# Patient Record
Sex: MALE | Race: BLACK OR AFRICAN AMERICAN | Employment: UNEMPLOYED | URBAN - METROPOLITAN AREA
[De-identification: names, ages, dates, MRNs, and addresses within clinical notes are randomized per-mention and may not be internally consistent; named-entity substitution may affect disease eponyms.]

---

## 2019-05-05 ENCOUNTER — APPOINTMENT (OUTPATIENT)
Dept: CT IMAGING | Age: 64
End: 2019-05-05
Payer: MEDICARE

## 2019-05-05 ENCOUNTER — HOSPITAL ENCOUNTER (EMERGENCY)
Age: 64
Discharge: HOME | End: 2019-05-05
Attending: EMERGENCY MEDICINE
Payer: MEDICARE

## 2019-05-05 VITALS
HEART RATE: 82 BPM | HEIGHT: 73 IN | SYSTOLIC BLOOD PRESSURE: 128 MMHG | DIASTOLIC BLOOD PRESSURE: 97 MMHG | OXYGEN SATURATION: 97 % | RESPIRATION RATE: 20 BRPM | TEMPERATURE: 97.5 F | WEIGHT: 180 LBS | BODY MASS INDEX: 23.86 KG/M2

## 2019-05-05 DIAGNOSIS — F10.920 ACUTE ALCOHOLIC INTOXICATION WITHOUT COMPLICATION (HCC): ICD-10-CM

## 2019-05-05 DIAGNOSIS — S09.90XA CLOSED HEAD INJURY, INITIAL ENCOUNTER: Primary | ICD-10-CM

## 2019-05-05 LAB
ABO/RH: NORMAL
ABSOLUTE EOS #: 0.1 K/UL (ref 0–0.4)
ABSOLUTE IMMATURE GRANULOCYTE: ABNORMAL K/UL (ref 0–0.3)
ABSOLUTE LYMPH #: 2.3 K/UL (ref 1–4.8)
ABSOLUTE MONO #: 0.5 K/UL (ref 0.1–1.3)
ALBUMIN SERPL-MCNC: 3.8 G/DL (ref 3.5–5.2)
ALBUMIN/GLOBULIN RATIO: ABNORMAL (ref 1–2.5)
ALP BLD-CCNC: 110 U/L (ref 40–129)
ALT SERPL-CCNC: 102 U/L (ref 5–41)
ANION GAP SERPL CALCULATED.3IONS-SCNC: 13 MMOL/L (ref 9–17)
ANTIBODY SCREEN: NEGATIVE
ARM BAND NUMBER: NORMAL
AST SERPL-CCNC: 127 U/L
BASOPHILS # BLD: 1 % (ref 0–2)
BASOPHILS ABSOLUTE: 0 K/UL (ref 0–0.2)
BILIRUB SERPL-MCNC: 0.68 MG/DL (ref 0.3–1.2)
BUN BLDV-MCNC: 8 MG/DL (ref 8–23)
BUN/CREAT BLD: ABNORMAL (ref 9–20)
CALCIUM SERPL-MCNC: 8.3 MG/DL (ref 8.6–10.4)
CHLORIDE BLD-SCNC: 105 MMOL/L (ref 98–107)
CO2: 27 MMOL/L (ref 20–31)
CREAT SERPL-MCNC: 0.77 MG/DL (ref 0.7–1.2)
DIFFERENTIAL TYPE: ABNORMAL
EOSINOPHILS RELATIVE PERCENT: 2 % (ref 0–4)
ETHANOL PERCENT: 0.28 %
ETHANOL: 281 MG/DL
EXPIRATION DATE: NORMAL
GFR AFRICAN AMERICAN: >60 ML/MIN
GFR NON-AFRICAN AMERICAN: >60 ML/MIN
GFR SERPL CREATININE-BSD FRML MDRD: ABNORMAL ML/MIN/{1.73_M2}
GFR SERPL CREATININE-BSD FRML MDRD: ABNORMAL ML/MIN/{1.73_M2}
GLUCOSE BLD-MCNC: 109 MG/DL (ref 70–99)
HCT VFR BLD CALC: 43.7 % (ref 41–53)
HEMOGLOBIN: 14.9 G/DL (ref 13.5–17.5)
IMMATURE GRANULOCYTES: ABNORMAL %
INR BLD: 1.1
LYMPHOCYTES # BLD: 45 % (ref 24–44)
MCH RBC QN AUTO: 32.6 PG (ref 26–34)
MCHC RBC AUTO-ENTMCNC: 34 G/DL (ref 31–37)
MCV RBC AUTO: 95.8 FL (ref 80–100)
MONOCYTES # BLD: 9 % (ref 1–7)
NRBC AUTOMATED: ABNORMAL PER 100 WBC
PARTIAL THROMBOPLASTIN TIME: 36 SEC (ref 24–36)
PDW BLD-RTO: 14.4 % (ref 11.5–14.9)
PLATELET # BLD: 155 K/UL (ref 150–450)
PLATELET ESTIMATE: ABNORMAL
PMV BLD AUTO: 7.9 FL (ref 6–12)
POTASSIUM SERPL-SCNC: 3.7 MMOL/L (ref 3.7–5.3)
PROTHROMBIN TIME: 14.6 SEC (ref 11.8–14.6)
RBC # BLD: 4.57 M/UL (ref 4.5–5.9)
RBC # BLD: ABNORMAL 10*6/UL
SEG NEUTROPHILS: 43 % (ref 36–66)
SEGMENTED NEUTROPHILS ABSOLUTE COUNT: 2.3 K/UL (ref 1.3–9.1)
SODIUM BLD-SCNC: 145 MMOL/L (ref 135–144)
TOTAL PROTEIN: 7.2 G/DL (ref 6.4–8.3)
WBC # BLD: 5.3 K/UL (ref 3.5–11)
WBC # BLD: ABNORMAL 10*3/UL

## 2019-05-05 PROCEDURE — 6370000000 HC RX 637 (ALT 250 FOR IP): Performed by: EMERGENCY MEDICINE

## 2019-05-05 PROCEDURE — 86901 BLOOD TYPING SEROLOGIC RH(D): CPT

## 2019-05-05 PROCEDURE — 71250 CT THORAX DX C-: CPT

## 2019-05-05 PROCEDURE — 85730 THROMBOPLASTIN TIME PARTIAL: CPT

## 2019-05-05 PROCEDURE — 36415 COLL VENOUS BLD VENIPUNCTURE: CPT

## 2019-05-05 PROCEDURE — 85025 COMPLETE CBC W/AUTO DIFF WBC: CPT

## 2019-05-05 PROCEDURE — 70486 CT MAXILLOFACIAL W/O DYE: CPT

## 2019-05-05 PROCEDURE — 70450 CT HEAD/BRAIN W/O DYE: CPT

## 2019-05-05 PROCEDURE — 99284 EMERGENCY DEPT VISIT MOD MDM: CPT

## 2019-05-05 PROCEDURE — 86900 BLOOD TYPING SEROLOGIC ABO: CPT

## 2019-05-05 PROCEDURE — 86850 RBC ANTIBODY SCREEN: CPT

## 2019-05-05 PROCEDURE — G0480 DRUG TEST DEF 1-7 CLASSES: HCPCS

## 2019-05-05 PROCEDURE — 72125 CT NECK SPINE W/O DYE: CPT

## 2019-05-05 PROCEDURE — 80053 COMPREHEN METABOLIC PANEL: CPT

## 2019-05-05 PROCEDURE — 85610 PROTHROMBIN TIME: CPT

## 2019-05-05 RX ORDER — ACETAMINOPHEN 500 MG
1000 TABLET ORAL ONCE
Status: COMPLETED | OUTPATIENT
Start: 2019-05-05 | End: 2019-05-05

## 2019-05-05 RX ORDER — LORAZEPAM 1 MG/1
1 TABLET ORAL ONCE
Status: COMPLETED | OUTPATIENT
Start: 2019-05-05 | End: 2019-05-05

## 2019-05-05 RX ADMIN — ACETAMINOPHEN 1000 MG: 500 TABLET, FILM COATED ORAL at 06:20

## 2019-05-05 RX ADMIN — LORAZEPAM 1 MG: 1 TABLET ORAL at 06:20

## 2019-05-05 ASSESSMENT — PAIN SCALES - GENERAL: PAINLEVEL_OUTOF10: 8

## 2019-05-05 ASSESSMENT — PAIN DESCRIPTION - ORIENTATION: ORIENTATION: LEFT

## 2019-05-05 ASSESSMENT — PAIN DESCRIPTION - FREQUENCY: FREQUENCY: CONTINUOUS

## 2019-05-05 ASSESSMENT — PAIN DESCRIPTION - PAIN TYPE: TYPE: ACUTE PAIN

## 2019-05-05 ASSESSMENT — PAIN DESCRIPTION - LOCATION: LOCATION: ARM

## 2019-05-05 NOTE — ED NOTES
Pt arrives to ED c/o fall, incontinence and left sided weakness. Pt states he was at a bar last night when he fell off of a stool. Pt states he is unsure if he hit his head or not. Pt states his friends later took him to his nieces house where he fell asleep until he woke up and urinated on himself. Pt states he then noticed that his left arm felt tingly and numb. Pt states he cant move his left arm or leg. Pt states that while he was at the bar he drank 5 shots of gin, patron, and budweiser. Pt states he drinks daily and finds no problem with it. When this RN asked if he drinks upon waking up in the morning pt stated \"hell yeah I do and I smoke marijuana. \" Pt states he used to be an IV drug user but no longer uses. Upon arrival this RN witnessed pt rolling over from stretcher to bed and moving himself up. Pt attached to cardiac monitor and continuous pulse oximetry. Pt is A&Ox4, eupneic, PWD. GCS=15. Call light in reach.          Meaghan Melchor RN  05/05/19 9082

## 2019-05-05 NOTE — ED NOTES
Report given to Shiela Hallman RN from ED. Report method in person   The following was reviewed with receiving RN:   Current vital signs:  BP (!) 123/90   Pulse 74   Temp 97.5 °F (36.4 °C) (Oral)   Resp 16   Ht 6' 1\" (1.854 m)   Wt 180 lb (81.6 kg)   SpO2 97%   BMI 23.75 kg/m²                MEWS Score: 1     Any medication or safety alerts were reviewed. Any pending diagnostics and notifications were also reviewed, as well as any safety concerns or issues, abnormal labs, abnormal imaging, and abnormal assessment findings. Questions were answered.             Elvira Morales RN  05/05/19 0055

## 2019-05-05 NOTE — ED NOTES
Bed: 08  Expected date:   Expected time:   Means of arrival:   Comments:     Tamia Gallego RN  05/05/19 6336

## 2019-05-05 NOTE — ED NOTES
C= \"Have you ever felt that you should Cut down on your drinking? \"  No  A= \"Have people Annoyed you by criticizing your drinking? \"  No  G= \"Have you ever felt bad or Guilty about your drinking? \"  No  E= \"Have you ever had a drink as an Eye-opener first thing in the morning to steady your nerves or to help a hangover? \"  Yes      Deferred []      Reason for deferring: N/A    *If yes to two or more: probable alcohol abuse. Marialuisa Rubio RN  05/05/19 0955

## 2019-05-05 NOTE — ED PROVIDER NOTES
eMERGENCY dEPARTMENT eNCOUnter    Pt Name: Justina Ceron  MRN: [id number]  [place] 1955  Date of evaluation: 5/5/19  CHIEF COMPLAINT       Chief Complaint   Patient presents with    Fall    Alcohol Intoxication     HISTORY OF PRESENT ILLNESS   HPI  HISTORY OF PRESENT ILLNESS:  Past medical history of alcohol abuse presents for chief complaint of diffuse left-sided pain as well as headache after a fall from the ball stool last evening. Patient was able to make it home. Patient states he is having a hard time moving because of his all over pain. However was able to roll over the copy. Patient denies any chest pain shortness breath. No lightheadedness or dizziness. No blurry vision. No nausea or vomiting. Severity is moderate. No aggravating or relieving factors. Timing is one day. Course is constant.   Context is trauma  -----------------------  -----------------------  REVIEW OF SYSTEMS  ED Caveat: [none]  Gen:  No fever, no chills  CV: No CP, no palpitations  Resp: No SOB, no respiratory distress  GI: No V/D, no abd pain  : No dysuria, no increased frequency  Skin: No rash, no purulent lesions  Eyes: No blurry vision, No double vision  MSK: No back pain, + joint pain  Neuro: + HA, no sensation changes  Psych: No SI/HI  -----------------------  -----------------------  ALLERGIES  -per nursing records, reviewed    PAST MEDICAL HISTORY  -See HPI    SOCIAL HISTORY  -daily drinking, no IV drugs  -----------------------  -----------------------  PHYSICAL EXAM  Gen: Alert, no acute distress  Skin: Warm, no rashes  Head: Normocephalic, atraumatic  Neck: No midline tenderness, no nuchal rigidity  Eye: EOMI, left artificial eye, normal conjunctiva  ENT: Mucous membranes moist, no pharyngeal erythema  CV: Normal rate, no rubs  Resp: Respirations unlabored, lungs clear to auscultation  GI: Soft, non distended, no large abdominal masses, non tender  MSK: No midline back pain, no large joint effusions  Neuro:  EYE SURGERY Left 8/27/13    repair of ruptured globe; vitrectomy    EYE SURGERY Left 9/24/13    enuculation    HERNIA REPAIR      inguinal    UMBILICAL HERNIA REPAIR      VEIN SURGERY      Leg vein    VITRECTOMY Left 08/27/2013    and globe repair     CURRENT MEDICATIONS       Discharge Medication List as of 5/5/2019  9:43 AM      CONTINUE these medications which have NOT CHANGED    Details   gabapentin (NEURONTIN) 300 MG capsule Take 1 capsule by mouth nightly., Disp-30 capsule, R-3      Multiple Vitamins-Minerals (THERAPEUTIC MULTIVITAMIN-MINERALS) tablet Take 1 tablet by mouth daily. , Disp-30 tablet, R-11      albuterol (VENTOLIN HFA) 108 (90 BASE) MCG/ACT inhaler Inhale 2 puffs into the lungs every 6 hours as needed for Wheezing., Disp-1 Inhaler, R-3      escitalopram (LEXAPRO) 20 MG tablet Take 20 mg by mouth daily. risperiDONE (RISPERDAL) 1 MG tablet Take 10 mg by mouth 2 times daily. ALLERGIES     is allergic to pcn [penicillins]; vicodin [hydrocodone-acetaminophen]; and motrin [ibuprofen]. FAMILY HISTORY     has no family status information on file. SOCIAL HISTORY       Social History     Tobacco Use    Smoking status: Current Some Day Smoker     Packs/day: 3.00     Years: 50.00     Pack years: 150.00     Types: Cigarettes   Substance Use Topics    Alcohol use:  Yes     Alcohol/week: 16.8 oz     Types: 28 Cans of beer per week    Drug use: Yes     Types: Marijuana     Comment: occasional     PHYSICAL EXAM     INITIAL VITALS: BP (!) 128/97   Pulse 82   Temp 97.5 °F (36.4 °C) (Oral)   Resp 20   Ht 6' 1\" (1.854 m)   Wt 180 lb (81.6 kg)   SpO2 97%   BMI 23.75 kg/m²    Physical Exam    MEDICAL DECISION MAKING:            Labs Reviewed   CBC WITH AUTO DIFFERENTIAL - Abnormal; Notable for the following components:       Result Value    Lymphocytes 45 (*)     Monocytes 9 (*)     All other components within normal limits   COMPREHENSIVE METABOLIC PANEL W/ REFLEX TO MG FOR LOW K - Abnormal; Notable for the following components:    Glucose 109 (*)     Calcium 8.3 (*)     Sodium 145 (*)      (*)      (*)     All other components within normal limits   ETHANOL - Abnormal; Notable for the following components:    Ethanol 281 (*)     All other components within normal limits   PROTIME-INR   APTT   TYPE AND SCREEN     EMERGENCY DEPARTMENTCOURSE:         Vitals:    Vitals:    05/05/19 0630 05/05/19 0730 05/05/19 0830 05/05/19 0930   BP: (!) 123/90 99/70 101/68 (!) 128/97   Pulse: 74  78 82   Resp: 16  20 20   Temp:       TempSrc:       SpO2:  94% 94% 97%   Weight:       Height:           The patient was given the following medications while in the emergency department:  Orders Placed This Encounter   Medications    acetaminophen (TYLENOL) tablet 1,000 mg    LORazepam (ATIVAN) tablet 1 mg     CONSULTS:  None    FINAL IMPRESSION      1. Closed head injury, initial encounter    2.  Acute alcoholic intoxication without complication [facility])          DISPOSITION/PLAN   DISPOSITION        PATIENT REFERRED TO:  PEEWEE Purcell - CNP  [address] [address]  [phone]    Call in 1 day      DISCHARGE MEDICATIONS:  Discharge Medication List as of 5/5/2019  9:43 AM        Ricardo Duron MD  Attending Emergency Physician                    Sixto Tabares MD  05/05/19 3968

## 2019-05-15 ENCOUNTER — TELEPHONE (OUTPATIENT)
Dept: FAMILY MEDICINE CLINIC | Age: 64
End: 2019-05-15

## 2021-05-16 ENCOUNTER — HOSPITAL ENCOUNTER (INPATIENT)
Age: 66
LOS: 4 days | Discharge: HOME | DRG: 280 | End: 2021-05-20
Attending: EMERGENCY MEDICINE | Admitting: INTERNAL MEDICINE
Payer: MEDICARE

## 2021-05-16 ENCOUNTER — APPOINTMENT (OUTPATIENT)
Dept: CT IMAGING | Age: 66
DRG: 280 | End: 2021-05-16
Payer: MEDICARE

## 2021-05-16 DIAGNOSIS — K70.40 ALCOHOLIC LIVER FAILURE (HCC): ICD-10-CM

## 2021-05-16 DIAGNOSIS — E80.6 HYPERBILIRUBINEMIA: ICD-10-CM

## 2021-05-16 DIAGNOSIS — R10.84 GENERALIZED ABDOMINAL PAIN: ICD-10-CM

## 2021-05-16 PROBLEM — K70.31 ALCOHOLIC CIRRHOSIS OF LIVER WITH ASCITES (HCC): Status: ACTIVE | Noted: 2021-05-16

## 2021-05-16 PROBLEM — K62.5 RECTAL BLEED: Status: ACTIVE | Noted: 2021-05-16

## 2021-05-16 PROBLEM — R60.0 PEDAL EDEMA: Status: ACTIVE | Noted: 2021-05-16

## 2021-05-16 PROBLEM — R79.1 ABNORMAL INR: Status: ACTIVE | Noted: 2021-05-16

## 2021-05-16 PROBLEM — E87.20 LACTIC ACIDOSIS: Status: ACTIVE | Noted: 2021-05-16

## 2021-05-16 PROBLEM — D69.6 THROMBOCYTOPENIA (HCC): Status: ACTIVE | Noted: 2021-05-16

## 2021-05-16 PROBLEM — R18.8 ABDOMINAL ASCITES: Status: ACTIVE | Noted: 2021-05-16

## 2021-05-16 PROBLEM — R17 JAUNDICE: Status: ACTIVE | Noted: 2021-05-16

## 2021-05-16 LAB
ABSOLUTE EOS #: <0.03 K/UL (ref 0–0.44)
ABSOLUTE IMMATURE GRANULOCYTE: 0.17 K/UL (ref 0–0.3)
ABSOLUTE LYMPH #: 1.09 K/UL (ref 1.1–3.7)
ABSOLUTE MONO #: 0.99 K/UL (ref 0.1–1.2)
ALBUMIN FLUID: <0.2 G/DL
ALBUMIN SERPL-MCNC: 2.7 G/DL (ref 3.5–5.2)
ALBUMIN/GLOBULIN RATIO: 0.7 (ref 1–2.5)
ALP BLD-CCNC: 211 U/L (ref 40–129)
ALT SERPL-CCNC: 193 U/L (ref 5–41)
AMYLASE FLUID: 33 U/L
ANION GAP SERPL CALCULATED.3IONS-SCNC: 12 MMOL/L (ref 9–17)
AST SERPL-CCNC: 221 U/L
BASOPHILS # BLD: 1 % (ref 0–2)
BASOPHILS ABSOLUTE: 0.07 K/UL (ref 0–0.2)
BILIRUB SERPL-MCNC: 18.28 MG/DL (ref 0.3–1.2)
BNP INTERPRETATION: NORMAL
BUN BLDV-MCNC: 14 MG/DL (ref 8–23)
BUN/CREAT BLD: ABNORMAL (ref 9–20)
CALCIUM SERPL-MCNC: 8.5 MG/DL (ref 8.6–10.4)
CHLORIDE BLD-SCNC: 97 MMOL/L (ref 98–107)
CO2: 22 MMOL/L (ref 20–31)
CREAT SERPL-MCNC: 0.3 MG/DL (ref 0.7–1.2)
DIFFERENTIAL TYPE: ABNORMAL
EOSINOPHILS RELATIVE PERCENT: 0 % (ref 1–4)
ETHANOL PERCENT: <0.01 %
ETHANOL: <10 MG/DL
GFR AFRICAN AMERICAN: >60 ML/MIN
GFR NON-AFRICAN AMERICAN: >60 ML/MIN
GFR SERPL CREATININE-BSD FRML MDRD: ABNORMAL ML/MIN/{1.73_M2}
GFR SERPL CREATININE-BSD FRML MDRD: ABNORMAL ML/MIN/{1.73_M2}
GLUCOSE BLD-MCNC: 87 MG/DL (ref 70–99)
GLUCOSE, FLUID: 92 MG/DL
HCT VFR BLD CALC: 44.5 % (ref 40.7–50.3)
HEMOGLOBIN: 15.3 G/DL (ref 13–17)
IMMATURE GRANULOCYTES: 2 %
INR BLD: 3.3
LACTATE DEHYDROGENASE, FLUID: 23 U/L
LACTIC ACID, WHOLE BLOOD: 3.2 MMOL/L (ref 0.7–2.1)
LACTIC ACID, WHOLE BLOOD: 3.3 MMOL/L (ref 0.7–2.1)
LACTIC ACID: ABNORMAL MMOL/L
LACTIC ACID: ABNORMAL MMOL/L
LYMPHOCYTES # BLD: 12 % (ref 24–43)
MCH RBC QN AUTO: 33.4 PG (ref 25.2–33.5)
MCHC RBC AUTO-ENTMCNC: 34.4 G/DL (ref 28.4–34.8)
MCV RBC AUTO: 97.2 FL (ref 82.6–102.9)
MONOCYTES # BLD: 11 % (ref 3–12)
NRBC AUTOMATED: 0 PER 100 WBC
PARTIAL THROMBOPLASTIN TIME: 40.3 SEC (ref 20.5–30.5)
PDW BLD-RTO: 16.4 % (ref 11.8–14.4)
PLATELET # BLD: ABNORMAL K/UL (ref 138–453)
PLATELET ESTIMATE: ABNORMAL
PLATELET, FLUORESCENCE: 96 K/UL (ref 138–453)
PLATELET, IMMATURE FRACTION: 3.4 % (ref 1.1–10.3)
PMV BLD AUTO: ABNORMAL FL (ref 8.1–13.5)
POTASSIUM SERPL-SCNC: 4.3 MMOL/L (ref 3.7–5.3)
PRO-BNP: 228 PG/ML
PROTHROMBIN TIME: 32.4 SEC (ref 9.1–12.3)
RBC # BLD: 4.58 M/UL (ref 4.21–5.77)
RBC # BLD: ABNORMAL 10*6/UL
SARS-COV-2, RAPID: NOT DETECTED
SEG NEUTROPHILS: 74 % (ref 36–65)
SEGMENTED NEUTROPHILS ABSOLUTE COUNT: 6.66 K/UL (ref 1.5–8.1)
SODIUM BLD-SCNC: 131 MMOL/L (ref 135–144)
SPECIMEN DESCRIPTION: NORMAL
SPECIMEN TYPE: NORMAL
TOTAL PROTEIN, BODY FLUID: <1 G/DL
TOTAL PROTEIN: 6.7 G/DL (ref 6.4–8.3)
TROPONIN INTERP: NORMAL
TROPONIN INTERP: NORMAL
TROPONIN T: NORMAL NG/ML
TROPONIN T: NORMAL NG/ML
TROPONIN, HIGH SENSITIVITY: 11 NG/L (ref 0–22)
TROPONIN, HIGH SENSITIVITY: 8 NG/L (ref 0–22)
WBC # BLD: 9 K/UL (ref 3.5–11.3)
WBC # BLD: ABNORMAL 10*3/UL

## 2021-05-16 PROCEDURE — 88305 TISSUE EXAM BY PATHOLOGIST: CPT

## 2021-05-16 PROCEDURE — 83550 IRON BINDING TEST: CPT

## 2021-05-16 PROCEDURE — 83605 ASSAY OF LACTIC ACID: CPT

## 2021-05-16 PROCEDURE — 83615 LACTATE (LD) (LDH) ENZYME: CPT

## 2021-05-16 PROCEDURE — 84484 ASSAY OF TROPONIN QUANT: CPT

## 2021-05-16 PROCEDURE — 85025 COMPLETE CBC W/AUTO DIFF WBC: CPT

## 2021-05-16 PROCEDURE — 2580000003 HC RX 258: Performed by: STUDENT IN AN ORGANIZED HEALTH CARE EDUCATION/TRAINING PROGRAM

## 2021-05-16 PROCEDURE — 36415 COLL VENOUS BLD VENIPUNCTURE: CPT

## 2021-05-16 PROCEDURE — 83880 ASSAY OF NATRIURETIC PEPTIDE: CPT

## 2021-05-16 PROCEDURE — 89051 BODY FLUID CELL COUNT: CPT

## 2021-05-16 PROCEDURE — 82105 ALPHA-FETOPROTEIN SERUM: CPT

## 2021-05-16 PROCEDURE — 93005 ELECTROCARDIOGRAM TRACING: CPT | Performed by: STUDENT IN AN ORGANIZED HEALTH CARE EDUCATION/TRAINING PROGRAM

## 2021-05-16 PROCEDURE — 82728 ASSAY OF FERRITIN: CPT

## 2021-05-16 PROCEDURE — 83540 ASSAY OF IRON: CPT

## 2021-05-16 PROCEDURE — 87205 SMEAR GRAM STAIN: CPT

## 2021-05-16 PROCEDURE — 6360000002 HC RX W HCPCS: Performed by: STUDENT IN AN ORGANIZED HEALTH CARE EDUCATION/TRAINING PROGRAM

## 2021-05-16 PROCEDURE — 1200000000 HC SEMI PRIVATE

## 2021-05-16 PROCEDURE — 80074 ACUTE HEPATITIS PANEL: CPT

## 2021-05-16 PROCEDURE — 84157 ASSAY OF PROTEIN OTHER: CPT

## 2021-05-16 PROCEDURE — 71260 CT THORAX DX C+: CPT

## 2021-05-16 PROCEDURE — 99284 EMERGENCY DEPT VISIT MOD MDM: CPT

## 2021-05-16 PROCEDURE — 82042 OTHER SOURCE ALBUMIN QUAN EA: CPT

## 2021-05-16 PROCEDURE — 86900 BLOOD TYPING SEROLOGIC ABO: CPT

## 2021-05-16 PROCEDURE — 6360000004 HC RX CONTRAST MEDICATION: Performed by: STUDENT IN AN ORGANIZED HEALTH CARE EDUCATION/TRAINING PROGRAM

## 2021-05-16 PROCEDURE — 87040 BLOOD CULTURE FOR BACTERIA: CPT

## 2021-05-16 PROCEDURE — 82150 ASSAY OF AMYLASE: CPT

## 2021-05-16 PROCEDURE — 85055 RETICULATED PLATELET ASSAY: CPT

## 2021-05-16 PROCEDURE — 86038 ANTINUCLEAR ANTIBODIES: CPT

## 2021-05-16 PROCEDURE — 0W9G3ZZ DRAINAGE OF PERITONEAL CAVITY, PERCUTANEOUS APPROACH: ICD-10-PCS | Performed by: EMERGENCY MEDICINE

## 2021-05-16 PROCEDURE — 87635 SARS-COV-2 COVID-19 AMP PRB: CPT

## 2021-05-16 PROCEDURE — C9113 INJ PANTOPRAZOLE SODIUM, VIA: HCPCS | Performed by: STUDENT IN AN ORGANIZED HEALTH CARE EDUCATION/TRAINING PROGRAM

## 2021-05-16 PROCEDURE — 82140 ASSAY OF AMMONIA: CPT

## 2021-05-16 PROCEDURE — 88112 CYTOPATH CELL ENHANCE TECH: CPT

## 2021-05-16 PROCEDURE — 85610 PROTHROMBIN TIME: CPT

## 2021-05-16 PROCEDURE — 86901 BLOOD TYPING SEROLOGIC RH(D): CPT

## 2021-05-16 PROCEDURE — 87075 CULTR BACTERIA EXCEPT BLOOD: CPT

## 2021-05-16 PROCEDURE — G0480 DRUG TEST DEF 1-7 CLASSES: HCPCS

## 2021-05-16 PROCEDURE — 80053 COMPREHEN METABOLIC PANEL: CPT

## 2021-05-16 PROCEDURE — 99223 1ST HOSP IP/OBS HIGH 75: CPT | Performed by: INTERNAL MEDICINE

## 2021-05-16 PROCEDURE — 86920 COMPATIBILITY TEST SPIN: CPT

## 2021-05-16 PROCEDURE — 82945 GLUCOSE OTHER FLUID: CPT

## 2021-05-16 PROCEDURE — 87070 CULTURE OTHR SPECIMN AEROBIC: CPT

## 2021-05-16 PROCEDURE — 84443 ASSAY THYROID STIM HORMONE: CPT

## 2021-05-16 PROCEDURE — 6370000000 HC RX 637 (ALT 250 FOR IP): Performed by: STUDENT IN AN ORGANIZED HEALTH CARE EDUCATION/TRAINING PROGRAM

## 2021-05-16 PROCEDURE — 86850 RBC ANTIBODY SCREEN: CPT

## 2021-05-16 PROCEDURE — 85730 THROMBOPLASTIN TIME PARTIAL: CPT

## 2021-05-16 RX ORDER — KETOROLAC TROMETHAMINE 15 MG/ML
15 INJECTION, SOLUTION INTRAMUSCULAR; INTRAVENOUS ONCE
Status: COMPLETED | OUTPATIENT
Start: 2021-05-16 | End: 2021-05-16

## 2021-05-16 RX ORDER — ONDANSETRON 2 MG/ML
4 INJECTION INTRAMUSCULAR; INTRAVENOUS EVERY 6 HOURS PRN
Status: DISCONTINUED | OUTPATIENT
Start: 2021-05-16 | End: 2021-05-20 | Stop reason: HOSPADM

## 2021-05-16 RX ORDER — FUROSEMIDE 20 MG/1
20 TABLET ORAL DAILY
Status: DISCONTINUED | OUTPATIENT
Start: 2021-05-16 | End: 2021-05-20 | Stop reason: HOSPADM

## 2021-05-16 RX ORDER — SPIRONOLACTONE 25 MG/1
25 TABLET ORAL DAILY
Status: DISCONTINUED | OUTPATIENT
Start: 2021-05-16 | End: 2021-05-20 | Stop reason: HOSPADM

## 2021-05-16 RX ORDER — SODIUM CHLORIDE 0.9 % (FLUSH) 0.9 %
5-40 SYRINGE (ML) INJECTION PRN
Status: DISCONTINUED | OUTPATIENT
Start: 2021-05-16 | End: 2021-05-20 | Stop reason: HOSPADM

## 2021-05-16 RX ORDER — LACTULOSE 10 G/15ML
20 SOLUTION ORAL 3 TIMES DAILY
Status: DISCONTINUED | OUTPATIENT
Start: 2021-05-16 | End: 2021-05-20 | Stop reason: HOSPADM

## 2021-05-16 RX ORDER — SODIUM CHLORIDE 9 MG/ML
INJECTION, SOLUTION INTRAVENOUS PRN
Status: DISCONTINUED | OUTPATIENT
Start: 2021-05-16 | End: 2021-05-16

## 2021-05-16 RX ORDER — PANTOPRAZOLE SODIUM 40 MG/1
40 TABLET, DELAYED RELEASE ORAL
Status: DISCONTINUED | OUTPATIENT
Start: 2021-05-17 | End: 2021-05-20 | Stop reason: HOSPADM

## 2021-05-16 RX ORDER — ONDANSETRON 2 MG/ML
4 INJECTION INTRAMUSCULAR; INTRAVENOUS ONCE
Status: COMPLETED | OUTPATIENT
Start: 2021-05-16 | End: 2021-05-16

## 2021-05-16 RX ORDER — NICOTINE 21 MG/24HR
1 PATCH, TRANSDERMAL 24 HOURS TRANSDERMAL DAILY
Status: DISCONTINUED | OUTPATIENT
Start: 2021-05-16 | End: 2021-05-20 | Stop reason: HOSPADM

## 2021-05-16 RX ORDER — SODIUM CHLORIDE 9 MG/ML
25 INJECTION, SOLUTION INTRAVENOUS PRN
Status: DISCONTINUED | OUTPATIENT
Start: 2021-05-16 | End: 2021-05-19

## 2021-05-16 RX ORDER — FENTANYL CITRATE 50 UG/ML
50 INJECTION, SOLUTION INTRAMUSCULAR; INTRAVENOUS ONCE
Status: COMPLETED | OUTPATIENT
Start: 2021-05-16 | End: 2021-05-16

## 2021-05-16 RX ORDER — IPRATROPIUM BROMIDE AND ALBUTEROL SULFATE 2.5; .5 MG/3ML; MG/3ML
1 SOLUTION RESPIRATORY (INHALATION) EVERY 4 HOURS PRN
Status: DISCONTINUED | OUTPATIENT
Start: 2021-05-16 | End: 2021-05-20 | Stop reason: HOSPADM

## 2021-05-16 RX ORDER — CIPROFLOXACIN 2 MG/ML
400 INJECTION, SOLUTION INTRAVENOUS EVERY 12 HOURS
Status: DISCONTINUED | OUTPATIENT
Start: 2021-05-16 | End: 2021-05-17

## 2021-05-16 RX ORDER — FENTANYL CITRATE 50 UG/ML
75 INJECTION, SOLUTION INTRAMUSCULAR; INTRAVENOUS ONCE
Status: COMPLETED | OUTPATIENT
Start: 2021-05-16 | End: 2021-05-16

## 2021-05-16 RX ORDER — SODIUM CHLORIDE 0.9 % (FLUSH) 0.9 %
5-40 SYRINGE (ML) INJECTION EVERY 12 HOURS SCHEDULED
Status: DISCONTINUED | OUTPATIENT
Start: 2021-05-16 | End: 2021-05-20 | Stop reason: HOSPADM

## 2021-05-16 RX ORDER — PROMETHAZINE HYDROCHLORIDE 12.5 MG/1
12.5 TABLET ORAL EVERY 6 HOURS PRN
Status: DISCONTINUED | OUTPATIENT
Start: 2021-05-16 | End: 2021-05-20 | Stop reason: HOSPADM

## 2021-05-16 RX ORDER — 0.9 % SODIUM CHLORIDE 0.9 %
500 INTRAVENOUS SOLUTION INTRAVENOUS ONCE
Status: COMPLETED | OUTPATIENT
Start: 2021-05-16 | End: 2021-05-16

## 2021-05-16 RX ORDER — PROMETHAZINE HYDROCHLORIDE 25 MG/ML
12.5 INJECTION, SOLUTION INTRAMUSCULAR; INTRAVENOUS ONCE
Status: COMPLETED | OUTPATIENT
Start: 2021-05-16 | End: 2021-05-16

## 2021-05-16 RX ADMIN — SODIUM CHLORIDE 500 ML: 9 INJECTION, SOLUTION INTRAVENOUS at 14:28

## 2021-05-16 RX ADMIN — ONDANSETRON 4 MG: 2 INJECTION INTRAMUSCULAR; INTRAVENOUS at 14:23

## 2021-05-16 RX ADMIN — SODIUM CHLORIDE 80 MG: 9 INJECTION, SOLUTION INTRAVENOUS at 14:58

## 2021-05-16 RX ADMIN — KETOROLAC TROMETHAMINE 15 MG: 15 INJECTION, SOLUTION INTRAMUSCULAR; INTRAVENOUS at 21:01

## 2021-05-16 RX ADMIN — SPIRONOLACTONE 25 MG: 25 TABLET ORAL at 21:48

## 2021-05-16 RX ADMIN — FUROSEMIDE 20 MG: 20 TABLET ORAL at 21:48

## 2021-05-16 RX ADMIN — FENTANYL CITRATE 75 MCG: 50 INJECTION, SOLUTION INTRAMUSCULAR; INTRAVENOUS at 17:02

## 2021-05-16 RX ADMIN — FENTANYL CITRATE 50 MCG: 50 INJECTION, SOLUTION INTRAMUSCULAR; INTRAVENOUS at 14:23

## 2021-05-16 RX ADMIN — PROMETHAZINE HYDROCHLORIDE 12.5 MG: 25 INJECTION INTRAMUSCULAR; INTRAVENOUS at 17:02

## 2021-05-16 RX ADMIN — IOPAMIDOL 75 ML: 755 INJECTION, SOLUTION INTRAVENOUS at 12:39

## 2021-05-16 RX ADMIN — LACTULOSE 20 G: 20 SOLUTION ORAL at 21:48

## 2021-05-16 RX ADMIN — CEFTRIAXONE SODIUM 1000 MG: 1 INJECTION, POWDER, FOR SOLUTION INTRAMUSCULAR; INTRAVENOUS at 21:47

## 2021-05-16 ASSESSMENT — ENCOUNTER SYMPTOMS
COUGH: 0
ABDOMINAL PAIN: 1
SHORTNESS OF BREATH: 1
ANAL BLEEDING: 1
PHOTOPHOBIA: 0
NAUSEA: 0
RECTAL PAIN: 1
BACK PAIN: 1
NAUSEA: 1
VOMITING: 0
ABDOMINAL DISTENTION: 1
BLOOD IN STOOL: 1
COLOR CHANGE: 1

## 2021-05-16 NOTE — ED PROVIDER NOTES
[facility]     Emergency Department     Faculty Note/ Attestation      Pt Name: Alona Rivas                                       MRN: [id number]  [place] 1955  Date of evaluation: 5/16/2021    Patients PCP:    PEEWEE Garcia - SON      Attestation  I performed a history and physical examination of the patient and discussed management with the resident. I reviewed the residents note and agree with the documented findings and plan of care. Any areas of disagreement are noted on the chart. I was personally present for the key portions of any procedures. I have documented in the chart those procedures where I was not present during the key portions. I have reviewed the emergency nurses triage note. I agree with the chief complaint, past medical history, past surgical history, allergies, medications, social and family history as documented unless otherwise noted below. For Physician Assistant/ Nurse Practitioner cases/documentation I have personally evaluated this patient and have completed at least one if not all key elements of the E/M (history, physical exam, and MDM). Additional findings are as noted.       Initial Screens:             Vitals:    Vitals:    05/16/21 1128 05/16/21 1137 05/16/21 1147   BP: 108/83     Pulse: 101     Resp: 17     Temp:   97.9 °F (36.6 °C)   SpO2: 99%     Weight:  180 lb (81.6 kg)    Height:  6' 1\" (1.854 m)        [address] [place] [address]       Chief Complaint   Patient presents with    Bloated    Abdominal Pain    Nausea             DIAGNOSTIC RESULTS             RADIOLOGY:   CT CHEST ABDOMEN PELVIS W CONTRAST    (Results Pending)         LABS:  Labs Reviewed   COVID-19, RAPID   CULTURE, BLOOD 1   CULTURE, BLOOD 1   PROTIME-INR   APTT   LACTIC ACID, PLASMA   CBC WITH AUTO DIFFERENTIAL   COMPREHENSIVE METABOLIC PANEL   TROPONIN   BRAIN NATRIURETIC PEPTIDE   ETHANOL   TYPE AND SCREEN   PREPARE RBC (CROSSMATCH)         EMERGENCY DEPARTMENT COURSE:     -------------------------  BP: 108/83, Temp: 97.9 °F (36.6 °C), Pulse: 101, Resp: 17      Comments    Jaundice, LGIB, chronic alcoholic  Patient is tachycardic, systolic blood pressure in the low 100s, no fever. Looks like he has prior LFT elevations with quite high ethanol levels in the past however no formal diagnosis of liver disease. Plan at this time for antibiotic prophylaxis for gut translocation, type and cross, blood cultures, liver and clotting work-up, likely admit the patient.     (Please note that portions of this note were completed with a voice recognition program.  Efforts were made to edit the dictations but occasionally words are mis-transcribed.)      Pamela Lama MD,, MD  Attending Emergency Physician         Pamela Lama MD  05/23/21 0740

## 2021-05-16 NOTE — ED NOTES
Bed: 02  Expected date:   Expected time:   Means of arrival:   Comments:  DEVEN Patel RN  05/16/21 1127

## 2021-05-16 NOTE — CARE COORDINATION
Attempted x2 to discuss discharge planning an homecare.  Family is not at bedside pt remains under blanket head covered

## 2021-05-16 NOTE — ED NOTES
Family is not bedside.  Writer will be available for support if they request.      JOS Alergia  05/16/21 5390

## 2021-05-16 NOTE — ED NOTES
Writer met with patient's daughter bedside. Daughter stated she works with [employer] as an aide. Daughter asked about enrolling patient in home care as well as PCP services. Writer stated Case Mgt submits home care referrals, RN can send patient's VICK to [employer] as requested. Writer stated some home care agencies link patients with PCP to follow throughout care timeframe. Writer briefly discussed [address], daughter stated she has clients on the program, will contact [employer] to enroll patient. Patient & daughter do not want SNF. Writer relayed daughter requests to ED case manager who will meet with patient/family.       JOS Escudero  05/16/21 8869

## 2021-05-16 NOTE — ED NOTES
Patient states that he hasnt been feeling well for 5 days. Patient states that he \"poops blood\", patient states that he is nauseated but unable to vomit. Patient is a  Daily drinker but states that he hasnt drank in a few days. Pt placed on continuous cardiac monitoring, BP, Pulse ox. EKG obtained. IV established and labs drawn. Patient placed in gown, and warm blankets.  Rectal exam was positive for occult blood      Yosvany Ying RN  05/16/21 7344

## 2021-05-16 NOTE — PROGRESS NOTES
Senior Note    HPI    The patient presented with a 1 week history of abdominal and lower extremity swelling, generalized pain and 5 episodes of rectal bleeding. He has also been more fatigued. He said he drinks a lot of alcohol; in the past he was seen in the ED for alcohol poisoning. His last drink was 5 days ago since he has been well. He smokes 2 packs/day since he was 4years old. He has never been diagnosed with liver cirrhosis. He does not follow with a PCP or GI. He does not take any medications. He denied any nausea, vomiting, syncope. Results     Sodium 131, potassium 4.3, chloride 97, bicarb 22, BUN 14, creatinine 0.30, glucose 87 anion gap 12, lactic acid 3.2  Albumin 2.7, alk phos 211, , , bili 18.28  WBC 9, hemoglobin 15.3, platelets 96  INR 3.3  Ethanol <10    CT chest abdomen pelvis 5/16/2021  Impression   1.  Emphysema and subsegmental atelectasis or scarring in the lung bases.       2.  Morphologic findings of cirrhosis are demonstrated.  Less than 5 mm   hypoattenuating liver lesion in the central left hepatic lobe is too small to   characterize.  Attention to on follow-up is recommended.       3.  Moderate volume abdominopelvic ascites.       4.  Edematous appearance of the proximal colon, favored to represent portal   hypertensive colopathy versus inflammatory colitis. Assessment    Principal Problem:    Alcoholic liver failure (HCC)  Active Problems:    COPD (chronic obstructive pulmonary disease) (HCC)    Marijuana abuse    Rectal bleed    Thrombocytopenia (HCC)    Abdominal ascites    Pedal edema    Lactic acidosis    Alcoholic cirrhosis of liver with ascites (HCC)    Jaundice  Resolved Problems:    * No resolved hospital problems. *      Plan    1. We will check acute hepatitis panel, RADHA, ASMA, AFP  2. Repeat lactic acid  3. And follow-up diagnostic paracentesis. 4. Will start Rocephin while SBP is been ruled out  5. Ammonia level given drowsiness  6.  Start oral lactulose 3 times daily  7. Thiamine folate multivitamin  8. follow-up liver ultrasound  9. Lasix 20, Aldactone 25. Monitor BUN/creatinine. Strict I's and O's  10. Start DuoNeb, Symbicort  11. GI consult for newly diagnosed cirrhosis. Will need EGD to the varices  12. Need outpatient pulmonology evaluation for COPD  15. Meld score 33 [65 to 66% 90-day mortality]  14. Child Balderas class C [1 to 3-year mortality]  15. Madrey discriminant function 110.7 [poor prognosis]. Would benefit from glucocorticoid therapy. 16. Encouraged to stop drinking    DVT prophylaxis: EPC cuffs  GI prophylaxis: Protonix  Discharge planning: ongoing with Case Management    Pt seen and examined at bedside. Agree with Intern's assessment and plan.        Mark Bonilla MD  PGY-3, Internal medicine resident  [place], [place]  5/16/2021 5:40 PM

## 2021-05-16 NOTE — H&P
[address]     Department of Internal Medicine - Staff Internal Medicine Teaching Service          ADMISSION NOTE/HISTORY AND PHYSICAL EXAMINATION   Date: 5/16/2021  Patient Name: Zahira Reyes  Date of admission: 5/16/2021 11:24 AM  YOB: 1955  PCP: PEEWEE Leon CNP  History Obtained From:  patient    CHIEF COMPLAINT     Chief complaint: Abdominal swelling, abdominal pain    HISTORY OF PRESENTING ILLNESS     29-year-old male with history of smoking, alcohol abuse, COPD presented to ER with progressive worsening of abdominal swelling associated with fatigue, abdominal pain and yellowing of skin. Patient also report of rectal bleed both fresh and old clots. report noticing blood after wiping the last 5 days associated with painful defecation. Patient denies any history of fever chills, nausea, vomiting. His last drink was 5 days ago. No previous history of upper GI endoscopy or colonoscopy. Vitally he is tachycardic 101, blood pressure 108/83. On exam, B/L edema noted with mild abdominal distention. Work-up in the ER show sodium 131, normal renal and cardiac profile, albumin 2.7, alk phos 211, , , bilirubin 18.28.  H&H 15.3 and 44.5 with MCV 97.2, platelet 96, INR 3.3 with PT 32.4. CT chest abd pelvis w contrast show emphysema and subsegmental atelectasis or scarring. Finding of cirrhosis. Less than 5 mm hypoattenuating liver lesion in left hepatic lobe. Moderate  abdominopelvic ascites. hypertensive colopathy versus inflammatory colitis. Diagnostic paracentesis was done in the ER so RBC 5000, neutrophil count 30, WBC 81. SAAG ratio less than 1.7. Review of Systems   Constitutional: Positive for activity change and fatigue. Negative for chills, fever and unexpected weight change. Cardiovascular: Positive for leg swelling. Negative for palpitations.    Gastrointestinal: Positive for abdominal distention, abdominal pain, anal bleeding, blood in stool and rectal pain. Negative for nausea and vomiting. Endocrine: Negative. Genitourinary: Negative for dysuria and hematuria. Musculoskeletal: Positive for back pain. Skin: Negative. Neurological: Negative for weakness, light-headedness and headaches. Psychiatric/Behavioral: Negative for behavioral problems, confusion and decreased concentration. The patient is not nervous/anxious. 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    Cervical spine fracture (HCC)     C7, after fall, no surgery    COPD [facility]ronic obstructive [facility] disease) [facility]     Depression     H/O: substance abuse ([facility] [address]     etoh,marijuana    Hep C w/o coma, chronic ([facility] [address] 10/15/2014    Hepatitis C     History of suicidal tendencies     Hx: UTI (urinary tract infection)     Schizophrenia, schizo-affective ([facility] [address]        PAST SURGICAL HISTORY     Past Surgical History:   Procedure Laterality Date    CATARACT REMOVAL WITH IMPLANT      Left eye    EYE SURGERY Left 8/27/13    repair of ruptured globe; vitrectomy    EYE SURGERY Left 9/24/13    enuculation    HERNIA REPAIR      inguinal    UMBILICAL HERNIA REPAIR      VEIN SURGERY      Leg vein    VITRECTOMY Left 08/27/2013    and globe repair       ALLERGIES     Pcn [penicillins], Vicodin [hydrocodone-acetaminophen], and Motrin [ibuprofen]    MEDICATIONS PRIOR TO ADMISSION     Prior to Admission medications    Medication Sig Start Date End Date Taking? Authorizing Provider   gabapentin (NEURONTIN) 300 MG capsule Take 1 capsule by mouth nightly. 10/15/14   PEEWEE Shepard CNP   Multiple Vitamins-Minerals (THERAPEUTIC MULTIVITAMIN-MINERALS) tablet Take 1 tablet by mouth daily. 10/15/14 10/15/15  PEEWEE Shepard CNP   albuterol (VENTOLIN HFA) 108 (90 BASE) MCG/ACT inhaler Inhale 2 puffs into the lungs every 6 hours as needed for Wheezing. pigmentation. No lesions or scars.   No cyanosis or clubbing  Neurological/Psychiatric: The patient's general behavior, level of consciousness, thought content and emotional status is normal.          INVESTIGATIONS     Laboratory Testing:     Recent Results (from the past 24 hour(s))   TYPE AND SCREEN    Collection Time: 05/16/21 11:46 AM   Result Value Ref Range    Expiration Date 05/19/2021,2359     Arm Band Number [id number]     ABO/Rh AB POSITIVE     Antibody Screen NEGATIVE     Unit Number [id number]     Product Code Leukocyte Reduced Red Cell     Unit Divison 00     Dispense Status ALLOCATED     Transfusion Status OK TO TRANSFUSE     Crossmatch Result COMPATIBLE    PROTIME-INR    Collection Time: 05/16/21 11:47 AM   Result Value Ref Range    Protime 32.4 (H) 9.1 - 12.3 sec    INR 3.3    APTT    Collection Time: 05/16/21 11:47 AM   Result Value Ref Range    PTT 40.3 (H) 20.5 - 30.5 sec   Lactic Acid, Plasma    Collection Time: 05/16/21 11:47 AM   Result Value Ref Range    Lactic Acid NOT REPORTED mmol/L    Lactic Acid, Whole Blood 3.2 (H) 0.7 - 2.1 mmol/L   CBC WITH AUTO DIFFERENTIAL    Collection Time: 05/16/21 11:47 AM   Result Value Ref Range    WBC 9.0 3.5 - 11.3 k/uL    RBC 4.58 4.21 - 5.77 m/uL    Hemoglobin 15.3 13.0 - 17.0 g/dL    Hematocrit 44.5 40.7 - 50.3 %    MCV 97.2 82.6 - 102.9 fL    MCH 33.4 25.2 - 33.5 pg    MCHC 34.4 28.4 - 34.8 g/dL    RDW 16.4 (H) 11.8 - 14.4 %    Platelets See Reflexed IPF Result 138 - 453 k/uL    MPV NOT REPORTED 8.1 - 13.5 fL    NRBC Automated 0.0 0.0 per 100 WBC    Differential Type NOT REPORTED     WBC Morphology NOT REPORTED     RBC Morphology ANISOCYTOSIS PRESENT     Platelet Estimate NOT REPORTED     Seg Neutrophils 74 (H) 36 - 65 %    Lymphocytes 12 (L) 24 - 43 %    Monocytes 11 3 - 12 %    Eosinophils % 0 (L) 1 - 4 %    Basophils 1 0 - 2 %    Immature Granulocytes 2 (H) 0 %    Segs Absolute 6.66 1.50 - 8.10 k/uL    Absolute Lymph # 1.09 (L) 1.10 - 3.70 k/uL Result Value Ref Range    Specimen Description . BLOOD     Special Requests L HAND     Culture NO GROWTH 1 HOUR    Culture, Blood 1    Collection Time: 05/16/21 12:34 PM    Specimen: Blood   Result Value Ref Range    Specimen Description . BLOOD     Special Requests RT HAND     Culture NO GROWTH 1 HOUR    Troponin    Collection Time: 05/16/21  2:34 PM   Result Value Ref Range    Troponin, High Sensitivity 8 0 - 22 ng/L    Troponin T NOT REPORTED <0.03 ng/mL    Troponin Interp NOT REPORTED    Body fluid cell count with differential    Collection Time: 05/16/21  2:59 PM   Result Value Ref Range    Color, Fluid NOT REPORTED     Appearance, Fluid NOT REPORTED     WBC, Fluid 81 /mm3    RBC, Fluid 5,000 /mm3    Specimen Type . ASCITIC FLUID     Neutrophil Count, Fluid 13 %    Lymphocytes, Body Fluid 0 %    Monocyte Count, Fluid NOT REPORTED %    Eos, Fluid NOT REPORTED 0 %    Basos, Fluid NOT REPORTED 0 %    Other Cells, Fluid PENDING %    Fluid Diff Comment NOT REPORTED    Amylase, body fluid    Collection Time: 05/16/21  2:59 PM   Result Value Ref Range    Specimen Type . ASCITIC FLUID     Amylase, Fluid 33 U/L   Albumin, fluid    Collection Time: 05/16/21  2:59 PM   Result Value Ref Range    Specimen Type . ASCITIC FLUID     Albumin, Fluid <0.2 g/dL   Protein, body fluid    Collection Time: 05/16/21  2:59 PM   Result Value Ref Range    Specimen Type . ASCITIC FLUID     Total Protein, Body Fluid <1.0 g/dL   Lactate dehydrogenase, body fluid    Collection Time: 05/16/21  2:59 PM   Result Value Ref Range    Specimen Type . ASCITIC FLUID     LD, Fluid 23 U/L   Glucose, body fluid    Collection Time: 05/16/21  2:59 PM   Result Value Ref Range    Specimen Type . ASCITIC FLUID     Glucose, Fluid 92 mg/dL       Imaging:   CT CHEST ABDOMEN PELVIS W CONTRAST    Result Date: 5/16/2021  1. Emphysema and subsegmental atelectasis or scarring in the lung bases. 2.  Morphologic findings of cirrhosis are demonstrated.   Less than 5 mm hypoattenuating liver lesion in the central left hepatic lobe is too small to characterize. Attention to on follow-up is recommended. 3.  Moderate volume abdominopelvic ascites. 4.  Edematous appearance of the proximal colon, favored to represent portal hypertensive colopathy versus inflammatory colitis. ASSESSMENT & PLAN     ASSESSMENT / PLAN:       Principal Problem:    Alcoholic liver failure (HCC)  Active Problems:    COPD (chronic obstructive pulmonary disease) (HCC)    Marijuana abuse    Rectal bleed    Thrombocytopenia (HCC)    Abdominal ascites    Pedal edema    Lactic acidosis    Alcoholic cirrhosis of liver with ascites (HCC)    Jaundice    Abnormal INR  Resolved Problems:    * No resolved hospital problems. *        1. 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 SAAG ratio 1.7. Meld-Na score 33 points with estimated 90 days mortality 65 to 66%. Child Balderas score class C. Follow-up on ultrasound liver to r/o thrombosis. GI consulted. On lactulose, Lasix and Aldactone and IV ceftriaxone 1 g every 24.  2. Rectal bleed: Monitor H&H. Avoid constipation. 3. Alcohol dependence: Encourage abstinence from drinking we will start him on thiamine folic acid, multivitamins. Monitor withdrawal.  4. Alcoholic hepatitis: Monitor daily liver profile, PT/INR. 5. Elevated INR: 3.3 likely due to decompensated liver disease. Monitor PT/INR  6. Thrombocytopenia: Likely due to cirrhosis, alcoholism. Will monitor blood counts  7. Lactic acidosis: Will trend lactic acid  8. Hyperlipidemia likely due to # 1: Will start him on high-protein diet  9. COPD: On RT aerosol, and DuoNeb. DVT ppx: None needed  GI ppx: On Protonix  Diet: Keep patient n.p.o. after midnight for possible variceal surveillance.   Low-salt high-protein diet    PT/OT/SW: Consulted  Discharge Planning: Consulted  to assist with discharge planning    Alissa Bernal MD  Internal

## 2021-05-16 NOTE — ED NOTES
The following labs labeled with pt sticker and tubed:     [x] Lavender   [] on ice   [x] Blue   [x] Green/yellow  [x] Green/black [x] on ice  [x] Pink  [] Red  [x] Yellow     [] COVID-19 swab    [] Rapid     [] Urine Sample  [] Pelvic Cultures    [] Blood Cultures          Lord Renee RN  05/16/21 [id number]

## 2021-05-16 NOTE — ED PROVIDER NOTES
[address]  ED  Emergency Department Encounter  EmergencyMedicine Resident     Pt Name:Dyllan Chapa  MRN: [id number]  [place] 1955  Date of evaluation: 21  PCP:  Danni Salas, PEEWEE [id number]       Chief Complaint   Patient presents with    Bloated    Abdominal Pain    Nausea       HISTORY OF PRESENT ILLNESS  (Location/Symptom, Timing/Onset, Context/Setting, Quality, Duration, Modifying Factors, Severity.)      Kalani Salcedo is a 77 y.o. male who presents with of rectal bleeding lightheadedness. Patient states that this is been ongoing for last several days. Patient's abdomen is bloated which is new for him and is complaining of abdominal pain patient is also complaining of shortness of breath that is made better by the oxygen applied by EMS. Pt is an alcoholic drinking a case of beer a week. history is also significant for heart failure. Patient is having new lower extremity pitting edema. Is also complaining of nausea but has been unable to vomit. PAST MEDICAL / SURGICAL / SOCIAL / FAMILY HISTORY      has a past medical history of Anxiety, Asthma, Blind left eye, Cervical spine fracture ([address] COPD (chronic obstructive pulmonary disease) ([address] Depression, H/O: substance abuse ([address] Hep C w/o coma, chronic ([address] Hepatitis C, History of suicidal tendencies, Hx: UTI (urinary tract infection), and Schizophrenia, schizo-affective ([address] has a past surgical history that includes Umbilical hernia repair; Cataract removal with implant; Vein Surgery; hernia repair; Eye surgery (Left, 13); vitrectomy (Left, 2013); and eye surgery (Left, 13).       Social History     Socioeconomic History    Marital status:      Spouse name: Not on file    Number of children: Not on file    Years of education: Not on file    Highest education level: Not on file   Occupational History    Not on file   Tobacco Use    Smoking status: Current Some Day Smoker     Packs/day: 3.00     Years: 50.00     Pack years: 150.00     Types: Cigarettes   Substance and Sexual Activity    Alcohol use: Yes     Alcohol/week: 28.0 standard drinks     Types: 28 Cans of beer per week    Drug use: Yes     Types: Marijuana     Comment: occasional    Sexual activity: Yes   Other Topics Concern    Not on file   Social History Narrative    Not on file     Social Determinants of Health     Financial Resource Strain:     Difficulty of Paying Living Expenses:    Food Insecurity:     Worried About Running Out of Food in the Last Year:     [address] in the Last Year:    Transportation Needs:     Lack of Transportation (Medical):  Lack of Transportation (Non-Medical):    Physical Activity:     Days of Exercise per Week:     Minutes of Exercise per Session:    Stress:     Feeling of Stress :    Social Connections:     Frequency of Communication with Friends and Family:     Frequency of Social Gatherings with Friends and Family:     Attends Baptism Services:     Active Member of Clubs or Organizations:     Attends Club or Organization Meetings:     Marital Status:    Intimate Partner Violence:     Fear of Current or Ex-Partner:     Emotionally Abused:     Physically Abused:     Sexually Abused:        History reviewed. No pertinent family history. Allergies:  Pcn [penicillins], Vicodin [hydrocodone-acetaminophen], and Motrin [ibuprofen]    Home Medications:  Prior to Admission medications    Medication Sig Start Date End Date Taking? Authorizing Provider   gabapentin (NEURONTIN) 300 MG capsule Take 1 capsule by mouth nightly. 10/15/14   PEEEWE Shepard CNP   Multiple Vitamins-Minerals (THERAPEUTIC MULTIVITAMIN-MINERALS) tablet Take 1 tablet by mouth daily. 10/15/14 10/15/15  PEEWEE Shepard CNP   albuterol (VENTOLIN HFA) 108 (90 BASE) MCG/ACT inhaler Inhale 2 puffs into the lungs every 6 hours as needed for Wheezing.  10/15/14   Ella TARAS Fraga, APRN - CNP   escitalopram (LEXAPRO) 20 MG tablet Take 20 mg by mouth daily. Historical Provider, MD   risperiDONE (RISPERDAL) 1 MG tablet Take 10 mg by mouth 2 times daily. Historical Provider, MD       REVIEW OF SYSTEMS    (2-9 systems for level 4, 10 or more for level 5)      Review of Systems   Constitutional: Positive for activity change, appetite change and fatigue. Negative for fever. HENT: Negative for nosebleeds. Eyes: Positive for visual disturbance. Negative for photophobia. S/p eye surgery   Respiratory: Positive for shortness of breath. Negative for cough. Cardiovascular: Positive for leg swelling. Negative for chest pain. Gastrointestinal: Positive for abdominal distention, abdominal pain, blood in stool and nausea. Genitourinary: Negative for difficulty urinating and hematuria. Musculoskeletal: Negative for neck pain. Skin: Positive for color change. Allergic/Immunologic: Negative for environmental allergies and food allergies. Neurological: Positive for weakness and light-headedness. Psychiatric/Behavioral: Negative for confusion. PHYSICAL EXAM   (up to 7 for level 4, 8 or more for level 5)      INITIAL VITALS:   /83   Pulse 93   Temp 97.9 °F (36.6 °C)   Resp 17   Ht 6' 1\" (1.854 m)   Wt 180 lb (81.6 kg)   SpO2 97%   BMI 23.75 kg/m²     Physical Exam  Vitals and nursing note reviewed. Constitutional:       Appearance: He is ill-appearing. He is not toxic-appearing. HENT:      Head: Normocephalic. Right Ear: External ear normal.      Left Ear: External ear normal.      Nose: Nose normal.      Mouth/Throat:      Mouth: Mucous membranes are dry. Eyes:      General: Scleral icterus present. Pupils: Pupils are equal, round, and reactive to light. Cardiovascular:      Rate and Rhythm: Regular rhythm. Tachycardia present. Heart sounds: Normal heart sounds. Pulmonary:      Breath sounds: Rales present.    Abdominal: General: There is distension. Tenderness: There is abdominal tenderness. Musculoskeletal:      Cervical back: Normal range of motion. Right lower leg: Edema present. Left lower leg: Edema present. Skin:     General: Skin is warm. Capillary Refill: Capillary refill takes less than 2 seconds. Neurological:      Mental Status: He is alert and oriented to person, place, and time.    Psychiatric:         Behavior: Behavior normal.         DIFFERENTIAL  DIAGNOSIS     PLAN (LABS / Evonne Cline / EKG):  Orders Placed This Encounter   Procedures    COVID-19, Rapid    Culture, Blood 1    Culture, Blood 1    Culture, Body Fluid    Gram stain    CT CHEST ABDOMEN PELVIS W CONTRAST    PROTIME-INR    APTT    Lactic Acid, Plasma    CBC WITH AUTO DIFFERENTIAL    Comprehensive Metabolic Panel    Troponin    Brain Natriuretic Peptide    ETHANOL    Immature Platelet Fraction    Troponin    Body fluid cell count with differential    Cytology, non gyne    Amylase, body fluid    Albumin, fluid    Protein, body fluid    Lactate dehydrogenase, body fluid    Glucose, body fluid    Verify hospital blood consent form has been signed and witnessed    Telemetry monitoring    Inpatient consult to Social Work    Inpatient consult to Internal Medicine    EKG 12 Lead    TYPE AND SCREEN    PREPARE RBC (CROSSMATCH), 1 Units    PATIENT STATUS (FROM ED OR OR/PROCEDURAL) Inpatient       MEDICATIONS ORDERED:  Orders Placed This Encounter   Medications    DISCONTD: 0.9 % sodium chloride infusion    pantoprazole (PROTONIX) 80 mg in sodium chloride 0.9 % 50 mL bolus    ondansetron (ZOFRAN) injection 4 mg    ciprofloxacin (CIPRO) IVPB 400 mg     Order Specific Question:   Antimicrobial Indications     Answer:   Intra-Abdominal Infection    iopamidol (ISOVUE-370) 76 % injection 75 mL    0.9 % sodium chloride bolus    fentaNYL (SUBLIMAZE) injection 50 mcg    promethazine (PHENERGAN) injection 12.5 mg CREATININE 0.30 (L) 0.70 - 1.20 mg/dL    Bun/Cre Ratio NOT REPORTED 9 - 20    Calcium 8.5 (L) 8.6 - 10.4 mg/dL    Sodium 131 (L) 135 - 144 mmol/L    Potassium 4.3 3.7 - 5.3 mmol/L    Chloride 97 (L) 98 - 107 mmol/L    CO2 22 20 - 31 mmol/L    Anion Gap 12 9 - 17 mmol/L    Alkaline Phosphatase 211 (H) 40 - 129 U/L     (H) 5 - 41 U/L     (H) <40 U/L    Total Bilirubin 18.28 (HH) 0.3 - 1.2 mg/dL    Total Protein 6.7 6.4 - 8.3 g/dL    Albumin 2.7 (L) 3.5 - 5.2 g/dL    Albumin/Globulin Ratio 0.7 (L) 1.0 - 2.5    GFR Non-African American >60 >60 mL/min    GFR African American >60 >60 mL/min    GFR Comment          GFR Staging NOT REPORTED    Troponin   Result Value Ref Range    Troponin, High Sensitivity 11 0 - 22 ng/L    Troponin T NOT REPORTED <0.03 ng/mL    Troponin Interp NOT REPORTED    Brain Natriuretic Peptide   Result Value Ref Range    Pro- <300 pg/mL    BNP Interpretation Pro-BNP Reference Range:    ETHANOL   Result Value Ref Range    Ethanol <10 <10 mg/dL    Ethanol percent <0.010 <0.010 %   Immature Platelet Fraction   Result Value Ref Range    Platelet, Immature Fraction 3.4 1.1 - 10.3 %    Platelet, Fluorescence 96 (L) 138 - 453 k/uL   TYPE AND SCREEN   Result Value Ref Range    Expiration Date 05/19/2021,2359     Arm Band Number [id number]     ABO/Rh AB POSITIVE     Antibody Screen NEGATIVE     Unit Number [id number]     Product Code Leukocyte Reduced Red Cell     Unit Divison 00     Dispense Status ALLOCATED     Transfusion Status OK TO TRANSFUSE     Crossmatch Result COMPATIBLE        IMPRESSION: Patient is an alert 24-year-old jaundiced male with distention of his abdomen lower extremity edema shortness of breath abdominal pain and history of chronic alcohol abuse. Stigmata of liver disease he is having a new oxygen requirement will be broad work-up labs imaging, rectal exam at bedside is FOBT positive.   We will type and cross for anticipated transfusion, protonix, anticipate No evidence for bowel obstruction. Edematous appearance of the proximal colon is noted, which may be related to portal hypertensive colopathy. Normal appendix. Pelvis: No acute findings. Peritoneum/Retroperitoneum: Moderate volume abdominopelvic ascites. No loculated fluid collection. No free air. The aorta is normal in caliber. Bones/Soft Tissues: No acute osseous abnormality identified. Advanced disc disease at L5-S1.     1.  Emphysema and subsegmental atelectasis or scarring in the lung bases. 2.  Morphologic findings of cirrhosis are demonstrated. Less than 5 mm hypoattenuating liver lesion in the central left hepatic lobe is too small to characterize. Attention to on follow-up is recommended. 3.  Moderate volume abdominopelvic ascites. 4.  Edematous appearance of the proximal colon, favored to represent portal hypertensive colopathy versus inflammatory colitis. EKG  Sinus rhythm at a rate of 93 there is normal axis QTC is 465 normal R wave progression low voltage, nonspecific ECG    All EKG's are interpreted by the Emergency Department Physician who either signs or Co-signs this chart in the absence of a cardiologist.    EMERGENCY DEPARTMENT COURSE:  ED Course as of May 16 1429   Sun May 16, 2021   1157 Pt fluid overloaded on exam will hold ivf at this time    [BG]   1211 H/h wnl     [BG]   1213 Given allergy to pcn will give cipro for sbp prophylaxis    [BG]   1227 Meld score 33, 3 month mortality 52.6%    [BG]   1233 Dispo pending ct imaging    [BG]   1250 Ct images reviewed    [BG]   1253 500cc ns bolus,     [BG]   1311 IMPRESSION:  1. Emphysema and subsegmental atelectasis or scarring in the lung bases.     2. Morphologic findings of cirrhosis are demonstrated. Less than 5 mm  hypoattenuating liver lesion in the central left hepatic lobe is too small to  characterize. Attention to on follow-up is recommended.     3.   Moderate volume abdominopelvic ascites.     4.  Edematous appearance of the proximal colon, favored to represent portal  hypertensive colopathy versus inflammatory colitis.       [BG]      ED Course User Index  [BG] Velia Quintanilla DO         PROCEDURES:  PROCEDURE NOTE - PARACENTESIS    PATIENT NAME: Zechariah Johnson  MEDICAL RECORD NO. [id number]  DATE: 5/16/2021  ATTENDING PHYSICIAN: Rubi Duncan    PREOPERATIVE DIAGNOSIS:  Ascites  POSTOPERATIVE DIAGNOSIS:  Same  PROCEDURE PERFORMED:   Paracentesis  PERFORMING PHYSICIAN: Bj Stacy. DISCUSSION:  Zechariah Johnson is a 77 y.o.  male  who requires paracentesis for Ascites. The history and physical examination were reviewed and confirmed. CONSENT: The patient provided verbal and written consent for this procedure. PROCEDURE:  The patient was placed in the supine position with the head of the bed slightly elevated and the appropriate landmarks were identified. The skin over the puncture site in the right lower quadrant region was prepped with betadine and draped in a sterile fashion. Local anesthesia was obtained by infiltration using 1% Lidocaine without epinephrine. A paracentesis catheter was then advanced into the abdominal cavity over a needle and the needle was withdrawn. Fluid was returned which was serous. A total volume of 20 cc was withdrawn which was sent to the lab for appropriate testing. The catheter was then withdrawn and a sterile dressing was placed over the site. The patient tolerated the procedure well. COMPLICATIONS:  None     Ivette Flores MS, RD  PGY-2 Emergency Medicine  5/16/2021  3:32 PM      CONSULTS:  None    CRITICAL CARE:  Please see attending note    FINAL IMPRESSION      1. Hyperbilirubinemia    2. Alcoholic liver failure ([facility] [address]          DISPOSITION / PLAN     DISPOSITION  admitted      PATIENT REFERRED TO:  No follow-up provider specified.     DISCHARGE MEDICATIONS:  New Prescriptions    No medications on file       Velia Quintanilla DO  Emergency Medicine Resident    (Please

## 2021-05-16 NOTE — FLOWSHEET NOTE
[facility] CARE DEPARTMENT - [address]     Emergency/Trauma Note    PATIENT NAME: Adriana Lazaro    Shift date:5/16/2021  Shift day: Sunday   Shift # 2    Room # 02/02   Name: Adriana Lazaro            Age: 77 y.o. Gender: male          Jehovah's witness: Yazdanism   Place of Jainism:     Trauma/Incident type: Emotional Distress  Admit Date & Time: 5/16/2021 11:24 AM  TRAUMA NAME:     ADVANCE DIRECTIVES IN CHART? No    NAME OF DECISION MAKER:     RELATIONSHIP OF DECISION MAKER TO PATIENT:     PATIENT/EVENT DESCRIPTION:  Adriana Lazaro is a 77 y.o. male who arrived via EMS with complaint  of abdominal pain and stomach bloating. Pt to be admitted to 02/02. SPIRITUAL ASSESSMENT/INTERVENTION:   asked by physician to talk to patient as he received a diagnosis of liver failure due to alcoholism. Patient was tearful, and sad.  was ministry of presence.  listened as patient stated he did not want to die. Patient stated he wanted to live to see his birthday on [birth year].  provided words of encouragement.  inquired about patient's support systems     05/16/21 1440   Encounter Summary   Services provided to: Patient; Family   Referral/Consult From: Physician   Support System Children   Continue Visiting   (5/16/2021)   Complexity of Encounter High   Length of Encounter 1 hour   Spiritual Assessment Completed Yes   Crisis   Type Emotional distress   Assessment Grieving; Anxious; Fearful   Intervention Active listening;Prayer;Sustaining presence/ Ministry of presence   Outcome Expressed gratitude   . Patient's daughter Gilberto Cox [phone] came to the patient's room.  asked for permission to pray for patient. Patient tearful during the entire visit. PATIENT BELONGINGS:  With patient    ANY BELONGINGS OF SIGNIFICANT VALUE NOTED:  None Noted    REGISTRATION STAFF NOTIFIED?   No      WHAT IS YOUR SPIRITUAL CARE PLAN FOR THIS PATIENT?:   Chaplains will remain available for emotional and spiritual support.    Electronically signed by Edmar Lee Resident, on 5/16/2021 at 2:42 PM.  [address]  [phone]

## 2021-05-17 ENCOUNTER — TELEPHONE (OUTPATIENT)
Dept: FAMILY MEDICINE CLINIC | Age: 66
End: 2021-05-17

## 2021-05-17 ENCOUNTER — APPOINTMENT (OUTPATIENT)
Dept: ULTRASOUND IMAGING | Age: 66
DRG: 280 | End: 2021-05-17
Payer: MEDICARE

## 2021-05-17 PROBLEM — E87.1 HYPONATREMIA: Status: ACTIVE | Noted: 2021-05-17

## 2021-05-17 LAB
ABSOLUTE EOS #: 0.06 K/UL (ref 0–0.44)
ABSOLUTE IMMATURE GRANULOCYTE: 0.09 K/UL (ref 0–0.3)
ABSOLUTE LYMPH #: 1 K/UL (ref 1.1–3.7)
ABSOLUTE MONO #: 0.75 K/UL (ref 0.1–1.2)
AFP: 22.2 UG/L
ALBUMIN SERPL-MCNC: 2.2 G/DL (ref 3.5–5.2)
ALBUMIN/GLOBULIN RATIO: 0.7 (ref 1–2.5)
ALP BLD-CCNC: 161 U/L (ref 40–129)
ALT SERPL-CCNC: 156 U/L (ref 5–41)
AMMONIA: 52 UMOL/L (ref 16–60)
ANION GAP SERPL CALCULATED.3IONS-SCNC: 9 MMOL/L (ref 9–17)
ANTI-NUCLEAR ANTIBODY (ANA): POSITIVE
APPEARANCE FLUID: NORMAL
AST SERPL-CCNC: 174 U/L
BASO FLUID: NORMAL %
BASOPHILS # BLD: 1 % (ref 0–2)
BASOPHILS ABSOLUTE: 0.05 K/UL (ref 0–0.2)
BILIRUB SERPL-MCNC: 14.8 MG/DL (ref 0.3–1.2)
BUN BLDV-MCNC: 16 MG/DL (ref 8–23)
BUN/CREAT BLD: ABNORMAL (ref 9–20)
CALCIUM SERPL-MCNC: 7.9 MG/DL (ref 8.6–10.4)
CASE NUMBER:: NORMAL
CHLORIDE BLD-SCNC: 100 MMOL/L (ref 98–107)
CO2: 20 MMOL/L (ref 20–31)
COLOR FLUID: NORMAL
CREAT SERPL-MCNC: 0.6 MG/DL (ref 0.7–1.2)
DIFFERENTIAL TYPE: ABNORMAL
EKG ATRIAL RATE: 93 BPM
EKG P AXIS: 57 DEGREES
EKG P-R INTERVAL: 152 MS
EKG Q-T INTERVAL: 374 MS
EKG QRS DURATION: 78 MS
EKG QTC CALCULATION (BAZETT): 465 MS
EKG R AXIS: 19 DEGREES
EKG T AXIS: 51 DEGREES
EKG VENTRICULAR RATE: 93 BPM
EOSINOPHIL FLUID: NORMAL %
EOSINOPHILS RELATIVE PERCENT: 1 % (ref 1–4)
FERRITIN: 3019 UG/L (ref 30–400)
FLUID DIFF COMMENT: NORMAL
GFR AFRICAN AMERICAN: >60 ML/MIN
GFR NON-AFRICAN AMERICAN: >60 ML/MIN
GFR SERPL CREATININE-BSD FRML MDRD: ABNORMAL ML/MIN/{1.73_M2}
GFR SERPL CREATININE-BSD FRML MDRD: ABNORMAL ML/MIN/{1.73_M2}
GLUCOSE BLD-MCNC: 94 MG/DL (ref 70–99)
HAV IGM SER IA-ACNC: NONREACTIVE
HCT VFR BLD CALC: 37.9 % (ref 40.7–50.3)
HEMOGLOBIN: 13.4 G/DL (ref 13–17)
HEPATITIS B CORE IGM ANTIBODY: NONREACTIVE
HEPATITIS B SURFACE ANTIGEN: NONREACTIVE
HEPATITIS C ANTIBODY: REACTIVE
IMMATURE GRANULOCYTES: 1 %
INR BLD: 3.4
IRON SATURATION: 71 % (ref 20–55)
IRON: 129 UG/DL (ref 59–158)
LACTIC ACID, WHOLE BLOOD: 2.6 MMOL/L (ref 0.7–2.1)
LACTIC ACID, WHOLE BLOOD: 2.8 MMOL/L (ref 0.7–2.1)
LACTIC ACID, WHOLE BLOOD: 3.9 MMOL/L (ref 0.7–2.1)
LACTIC ACID, WHOLE BLOOD: 4.2 MMOL/L (ref 0.7–2.1)
LACTIC ACID: ABNORMAL MMOL/L
LYMPHOCYTES # BLD: 16 % (ref 24–43)
LYMPHOCYTES, BODY FLUID: 0 %
MCH RBC QN AUTO: 33.8 PG (ref 25.2–33.5)
MCHC RBC AUTO-ENTMCNC: 35.4 G/DL (ref 28.4–34.8)
MCV RBC AUTO: 95.5 FL (ref 82.6–102.9)
MONOCYTE, FLUID: NORMAL %
MONOCYTES # BLD: 12 % (ref 3–12)
NEUTROPHIL, FLUID: 13 %
NRBC AUTOMATED: 0 PER 100 WBC
OTHER CELLS FLUID: NORMAL %
PDW BLD-RTO: 16.1 % (ref 11.8–14.4)
PLATELET # BLD: ABNORMAL K/UL (ref 138–453)
PLATELET ESTIMATE: ABNORMAL
PLATELET, FLUORESCENCE: 63 K/UL (ref 138–453)
PLATELET, IMMATURE FRACTION: 3.3 % (ref 1.1–10.3)
PMV BLD AUTO: ABNORMAL FL (ref 8.1–13.5)
POTASSIUM SERPL-SCNC: 3.9 MMOL/L (ref 3.7–5.3)
PROTHROMBIN TIME: 32.5 SEC (ref 9.1–12.3)
RBC # BLD: 3.97 M/UL (ref 4.21–5.77)
RBC # BLD: ABNORMAL 10*6/UL
RBC FLUID: 5000 /MM3
SEG NEUTROPHILS: 70 % (ref 36–65)
SEGMENTED NEUTROPHILS ABSOLUTE COUNT: 4.51 K/UL (ref 1.5–8.1)
SODIUM BLD-SCNC: 129 MMOL/L (ref 135–144)
SPECIMEN DESCRIPTION: NORMAL
SPECIMEN TYPE: NORMAL
TOTAL IRON BINDING CAPACITY: 181 UG/DL (ref 250–450)
TOTAL PROTEIN: 5.4 G/DL (ref 6.4–8.3)
TSH SERPL DL<=0.05 MIU/L-ACNC: 1.58 MIU/L (ref 0.3–5)
UNSATURATED IRON BINDING CAPACITY: 52 UG/DL (ref 112–347)
WBC # BLD: 6.5 K/UL (ref 3.5–11.3)
WBC # BLD: ABNORMAL 10*3/UL
WBC FLUID: 81 /MM3

## 2021-05-17 PROCEDURE — 83605 ASSAY OF LACTIC ACID: CPT

## 2021-05-17 PROCEDURE — 6360000002 HC RX W HCPCS: Performed by: STUDENT IN AN ORGANIZED HEALTH CARE EDUCATION/TRAINING PROGRAM

## 2021-05-17 PROCEDURE — 1200000000 HC SEMI PRIVATE

## 2021-05-17 PROCEDURE — 97165 OT EVAL LOW COMPLEX 30 MIN: CPT

## 2021-05-17 PROCEDURE — 93010 ELECTROCARDIOGRAM REPORT: CPT | Performed by: INTERNAL MEDICINE

## 2021-05-17 PROCEDURE — 97162 PT EVAL MOD COMPLEX 30 MIN: CPT

## 2021-05-17 PROCEDURE — 94640 AIRWAY INHALATION TREATMENT: CPT

## 2021-05-17 PROCEDURE — 2580000003 HC RX 258: Performed by: STUDENT IN AN ORGANIZED HEALTH CARE EDUCATION/TRAINING PROGRAM

## 2021-05-17 PROCEDURE — 6370000000 HC RX 637 (ALT 250 FOR IP): Performed by: STUDENT IN AN ORGANIZED HEALTH CARE EDUCATION/TRAINING PROGRAM

## 2021-05-17 PROCEDURE — 99232 SBSQ HOSP IP/OBS MODERATE 35: CPT | Performed by: INTERNAL MEDICINE

## 2021-05-17 PROCEDURE — 2500000003 HC RX 250 WO HCPCS: Performed by: STUDENT IN AN ORGANIZED HEALTH CARE EDUCATION/TRAINING PROGRAM

## 2021-05-17 PROCEDURE — 6370000000 HC RX 637 (ALT 250 FOR IP): Performed by: INTERNAL MEDICINE

## 2021-05-17 PROCEDURE — 97530 THERAPEUTIC ACTIVITIES: CPT

## 2021-05-17 PROCEDURE — 76705 ECHO EXAM OF ABDOMEN: CPT

## 2021-05-17 PROCEDURE — 85025 COMPLETE CBC W/AUTO DIFF WBC: CPT

## 2021-05-17 PROCEDURE — 82140 ASSAY OF AMMONIA: CPT

## 2021-05-17 PROCEDURE — 85610 PROTHROMBIN TIME: CPT

## 2021-05-17 PROCEDURE — 99254 IP/OBS CNSLTJ NEW/EST MOD 60: CPT | Performed by: INTERNAL MEDICINE

## 2021-05-17 PROCEDURE — 36415 COLL VENOUS BLD VENIPUNCTURE: CPT

## 2021-05-17 PROCEDURE — 80053 COMPREHEN METABOLIC PANEL: CPT

## 2021-05-17 PROCEDURE — 85055 RETICULATED PLATELET ASSAY: CPT

## 2021-05-17 PROCEDURE — 97535 SELF CARE MNGMENT TRAINING: CPT

## 2021-05-17 PROCEDURE — 93976 VASCULAR STUDY: CPT

## 2021-05-17 RX ORDER — KETOROLAC TROMETHAMINE 30 MG/ML
15 INJECTION, SOLUTION INTRAMUSCULAR; INTRAVENOUS ONCE
Status: DISCONTINUED | OUTPATIENT
Start: 2021-05-17 | End: 2021-05-20 | Stop reason: HOSPADM

## 2021-05-17 RX ORDER — BUDESONIDE AND FORMOTEROL FUMARATE DIHYDRATE 80; 4.5 UG/1; UG/1
2 AEROSOL RESPIRATORY (INHALATION) 2 TIMES DAILY
Status: DISCONTINUED | OUTPATIENT
Start: 2021-05-17 | End: 2021-05-20 | Stop reason: HOSPADM

## 2021-05-17 RX ORDER — KETOROLAC TROMETHAMINE 30 MG/ML
15 INJECTION, SOLUTION INTRAMUSCULAR; INTRAVENOUS ONCE
Status: COMPLETED | OUTPATIENT
Start: 2021-05-17 | End: 2021-05-17

## 2021-05-17 RX ADMIN — LACTULOSE 20 G: 20 SOLUTION ORAL at 15:26

## 2021-05-17 RX ADMIN — BUDESONIDE AND FORMOTEROL FUMARATE DIHYDRATE 2 PUFF: 80; 4.5 AEROSOL RESPIRATORY (INHALATION) at 21:02

## 2021-05-17 RX ADMIN — POLYETHYLENE GLYCOL 3350, SODIUM SULFATE ANHYDROUS, SODIUM BICARBONATE, SODIUM CHLORIDE, POTASSIUM CHLORIDE 4000 ML: 236; 22.74; 6.74; 5.86; 2.97 POWDER, FOR SOLUTION ORAL at 19:27

## 2021-05-17 RX ADMIN — CIPROFLOXACIN 400 MG: 2 INJECTION, SOLUTION INTRAVENOUS at 00:48

## 2021-05-17 RX ADMIN — FUROSEMIDE 20 MG: 20 TABLET ORAL at 12:12

## 2021-05-17 RX ADMIN — KETOROLAC TROMETHAMINE 15 MG: 30 INJECTION, SOLUTION INTRAMUSCULAR; INTRAVENOUS at 06:40

## 2021-05-17 RX ADMIN — FOLIC ACID: 5 INJECTION, SOLUTION INTRAMUSCULAR; INTRAVENOUS; SUBCUTANEOUS at 10:42

## 2021-05-17 RX ADMIN — SPIRONOLACTONE 25 MG: 25 TABLET ORAL at 12:12

## 2021-05-17 ASSESSMENT — PAIN DESCRIPTION - LOCATION
LOCATION: ABDOMEN
LOCATION: ABDOMEN

## 2021-05-17 ASSESSMENT — PAIN SCALES - GENERAL
PAINLEVEL_OUTOF10: 0
PAINLEVEL_OUTOF10: 9

## 2021-05-17 NOTE — PROGRESS NOTES
Physical Therapy    Facility/Department: [facility] RENAL//MED SURG  Initial Assessment    NAME: Adriana Lazaro  : 1955  MRN: [id number]    Date of Service: 2021  Chief Complaint   Patient presents with    Bloated    Abdominal Pain    Nausea     Discharge Recommendations:    No further therapy required at discharge. Assessment   Body structures, Functions, Activity limitations: Decreased functional mobility ; Decreased endurance;Decreased balance  Assessment: The pt ambulated 25 ft without a device x CGA. His balance was mildly off and he fatigued quickly with mobilization. Will continue with gait strengthening. Prognosis: Good  Decision Making: Medium Complexity  PT Education: Goals;PT Role;Plan of Care  REQUIRES PT FOLLOW UP: Yes  Activity Tolerance  Activity Tolerance: Patient limited by fatigue       Patient Diagnosis(es): The primary encounter diagnosis was Hyperbilirubinemia. A diagnosis of Alcoholic liver failure (HCC) was also pertinent to this visit. has a past medical history of Anxiety, Asthma, Blind left eye, Cervical spine fracture ([address] COPD (chronic obstructive pulmonary disease) ([address] Depression, H/O: substance abuse ([address] Hep C w/o coma, chronic ([address] Hepatitis C, History of suicidal tendencies, Hx: UTI (urinary tract infection), and Schizophrenia, schizo-affective ([address] has a past surgical history that includes Umbilical hernia repair; Cataract removal with implant; Vein Surgery; hernia repair; Eye surgery (Left, 13); vitrectomy (Left, 2013); and eye surgery (Left, 13).     Restrictions  Restrictions/Precautions  Restrictions/Precautions: Up as Tolerated  Required Braces or Orthoses?: No  Vision/Hearing  Vision:  (Glass eye and is supposed to have glasses but broke them)  Hearing: Within functional limits     Subjective  General  Patient assessed for rehabilitation services?: Yes  Response To Previous Treatment: Not applicable  Family / Caregiver Present: No  Follows Commands: Within Functional Limits  Subjective  Subjective: RN and pt agreeable to PT eval  Pain Screening  Patient Currently in Pain: Yes  Pain Assessment  Pain Assessment: 0-10  Pain Level: 9  Pain Location: Abdomen  Vital Signs  Patient Currently in Pain: Yes     Orientation  Orientation  Overall Orientation Status: Within Normal Limits  Social/Functional History  Social/Functional History  Lives With: Daughter  Type of Home: Apartment (3rd floor)  Home Layout: One level  Home Access: Stairs to enter with rails  Entrance Stairs - Number of Steps: 2 flights of stairs  Entrance Stairs - Rails: Right  Bathroom Shower/Tub: Tub/Shower unit  Bathroom Toilet: Standard  Bathroom Accessibility: Accessible  ADL Assistance: Independent  Homemaking Assistance: Independent  Homemaking Responsibilities: Yes (Daughter completes)  Meal Prep Responsibility: Secondary  Laundry Responsibility: Secondary  Cleaning Responsibility: Secondary  Shopping Responsibility: Secondary  Ambulation Assistance: Independent  Transfer Assistance: Independent  Active : No  Patient's  Info: daughter drives  Occupation: On disability  Leisure & Hobbies: Chess and [employer]  Cognition      Objective     AROM RLE (degrees)  RLE AROM: WNL  AROM LLE (degrees)  LLE AROM : WNL  AROM RUE (degrees)  RUE AROM : WNL  AROM LUE (degrees)  LUE AROM : WNL  Strength RLE  Strength RLE: WNL  Strength LLE  Strength LLE: WNL  Strength RUE  Strength RUE: WNL  Strength LUE  Strength LUE: WNL     Sensation  Overall Sensation Status: Impaired  Bed mobility  Supine to Sit: Contact guard assistance  Sit to Supine: Contact guard assistance  Scooting: Contact guard assistance  Transfers  Sit to Stand: Contact guard assistance  Stand to sit: Contact guard assistance  Ambulation  Ambulation?: Yes  Ambulation 1  Surface: level tile  Device: No Device  Assistance: Contact guard assistance  Distance: amb 25 ft without a device x CGA     Balance  Posture: Good  Sitting - Static: Good  Sitting - Dynamic: Good  Standing - Static: Fair  Standing - Dynamic: [address]  Times per week: 5-6x wk  Current Treatment Recommendations: Strengthening, Functional Mobility Training, Gait Training, Safety Education & Training, Balance Training, Endurance Training, Stair training, Pain Management  Safety Devices  Type of devices: Nurse notified, Left in bed, Call light within reach    G-Code     OutComes Score     AM-PAC Score  AM-PAC Inpatient Mobility Raw Score : 20 (05/17/21 1049)  AM-PAC Inpatient T-Scale Score : 47.67 (05/17/21 1049)  Mobility Inpatient CMS 0-100% Score: 35.83 (05/17/21 1049)  Mobility Inpatient CMS G-Code Modifier : Pearle Asp (05/17/21 1049)     Goals  Short term goals  Time Frame for Short term goals: 10 visits  Short term goal 1: transfers with SBA  Short term goal 2: pt to ambulate 150 ft without a device x SBA  Short term goal 3: ascend/descend 4 steps with SBA  Short term goal 4: 20 min exercise program x SBA  Patient Goals   Patient goals : Return home    Therapy Time   Individual Concurrent Group Co-treatment   Time In 1010         Time Out 1031         Minutes 21             [address] of [address], PT

## 2021-05-17 NOTE — PROGRESS NOTES
abd pelvis w contrast show emphysema and subsegmental atelectasis or scarring. Finding of cirrhosis. Less than 5 mm hypoattenuating liver lesion in left hepatic lobe. Moderate  abdominopelvic ascites. hypertensive colopathy versus inflammatory colitis. Diagnostic paracentesis was done in the ER so RBC 5000, neutrophil count 30, WBC 81. SAAG ratio less than 1.7. OBJECTIVE     Vital Signs:  BP (!) 167/87   Pulse 68   Temp 97.3 °F (36.3 °C) (Oral)   Resp 18   Ht 6' 1\" (1.854 m)   Wt 180 lb (81.6 kg)   SpO2 96%   BMI 23.75 kg/m²     Temp (24hrs), Av.7 °F (36.5 °C), Min:97.3 °F (36.3 °C), Max:98.1 °F (36.7 °C)    In: 120   Out: -     Physical Exam:  Constitutional: This is a well developed, well nourished, 18.5-24.9 - Normal 77y.o. year old male who is alert, oriented, cooperative and in no apparent distress. Head:normocephalic and atraumatic. EENT:  PERRLA. No conjunctival injections. Septum was midline, mucosa was without erythema, exudates or cobblestoning. Neck: Supple without thyromegaly. No elevated JVP. Trachea was midline. Respiratory: Chest was symmetrical without dullness to percussion. Breath sounds bilaterally were clear to auscultation. There were no wheezes, rhonchi or rales. There is no intercostal retraction or use of accessory muscles. No egophony noted. Cardiovascular: Regular without murmur, clicks, gallops or rubs. Abdomen: tender abdomen. Lymphatic: No lymphadenopathy. Musculoskeletal: Normal curvature of the spine. No gross muscle weakness. Extremities:  B/l lower extremity edema, No ulcerations, tenderness, varicosities or erythema.   Muscle size, tone and strength are normal.    Skin:  Bruises noted  Neurological/Psychiatric: The patient's general behavior, level of consciousness, thought content and emotional status is normal.        Medications:  Scheduled Medications:    budesonide-formoterol  2 puff Inhalation BID    folic acid, thiamine, multi-vitamin with vitamin K infusion   Intravenous Once    sodium chloride flush  5-40 mL Intravenous 2 times per day    cefTRIAXone (ROCEPHIN) IV  1,000 mg Intravenous Q24H    nicotine  1 patch Transdermal Daily    pantoprazole  40 mg Oral QAM AC    furosemide  20 mg Oral Daily    spironolactone  25 mg Oral Daily    lactulose  20 g Oral TID     Continuous Infusions:    sodium chloride       PRN Medicationsipratropium-albuterol, 1 ampule, Q4H PRN  sodium chloride flush, 5-40 mL, PRN  sodium chloride, 25 mL, PRN  promethazine, 12.5 mg, Q6H PRN   Or  ondansetron, 4 mg, Q6H PRN        Diagnostic Labs:  CBC:   Recent Labs     05/16/21  1147 05/17/21 0314   WBC 9.0 6.5   RBC 4.58 3.97*   HGB 15.3 13.4   HCT 44.5 37.9*   MCV 97.2 95.5   RDW 16.4* 16.1*   PLT See Reflexed IPF Result See Reflexed IPF Result     BMP:   Recent Labs     05/16/21  1147 05/17/21  0314   * 129*   K 4.3 3.9   CL 97* 100   CO2 22 20   BUN 14 16   CREATININE 0.30* 0.60*     BNP: No results for input(s): BNP in the last 72 hours. PT/INR:   Recent Labs     05/16/21 1147 05/17/21 0314   PROTIME 32.4* 32.5*   INR 3.3 3.4     APTT:   Recent Labs     05/16/21 1147   APTT 40.3*     CARDIAC ENZYMES: No results for input(s): CKMB, CKMBINDEX, TROPONINI in the last 72 hours. Invalid input(s): CKTOTAL;3  FASTING LIPID PANEL:No results found for: CHOL, HDL, TRIG  LIVER PROFILE:   Recent Labs     05/16/21  1147 05/17/21  0314   * 174*   * 156*   BILITOT 18.28* 14.80*   ALKPHOS 211* 161*      MICROBIOLOGY:   Lab Results   Component Value Date/Time    CULTURE NO GROWTH 16 HOURS 05/16/2021 03:00 PM       Imaging:    CT CHEST ABDOMEN PELVIS W CONTRAST    Result Date: 5/16/2021  1. Emphysema and subsegmental atelectasis or scarring in the lung bases. 2.  Morphologic findings of cirrhosis are demonstrated. Less than 5 mm hypoattenuating liver lesion in the central left hepatic lobe is too small to characterize.   Attention to on follow-up is recommended. 3.  Moderate volume abdominopelvic ascites. 4.  Edematous appearance of the proximal colon, favored to represent portal hypertensive colopathy versus inflammatory colitis. ASSESSMENT & PLAN     ASSESSMENT / PLAN:     1. 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 SAAG ratio 1.7. Meld-Na score 33 points with estimated 90 days mortality 65 to 66%. Child Balderas score class C. Follow-up on ultrasound liver to r/o thrombosis. GI consulted. On lactulose, Lasix and Aldactone and IV ceftriaxone 1 g every 24.  2. Rectal bleed: Monitor H&H. Avoid constipation. 3. Alcohol dependence: Encourage abstinence from drinking we will start him on thiamine folic acid, multivitamins. Monitor withdrawal.  4. Alcoholic hepatitis: Monitor daily liver profile, PT/INR. 5. Elevated INR: 3.4 likely due to decompensated liver disease. Monitor PT/INR  6. Thrombocytopenia: 63k. Likely due to cirrhosis, alcoholism. Will monitor blood counts  7. Lactic acidosis: Will trend lactic acid. This am 2.8  8. Hyperlipidemia likely due to # 1: Will start him on high-protein diet  9. COPD: On RT aerosol, and DuoNeb.       DVT ppx: None needed  GI ppx: On Protonix  Diet: Keep patient n.p.o. after midnight for possible variceal surveillance. Low-salt high-protein diet     PT/OT/SW: Consulted  Discharge Planning: Consulted  to assist with discharge planning    Nasim Johns MD  Internal Medicine Resident, PGY-1  [facility]; [place], [place]  5/17/2021, 8:48 AM   Attending Physician Statement  I have discussed the care of Wolfgang Ruiz, including pertinent history and exam findings,  with the resident. I have seen and examined the patient and the key elements of all parts of the encounter have been performed by me. I agree with the assessment, plan and orders as documented by the resident with additions .       Treatment plan Discussed with nursing staff in detail , all questions answered . Electronically signed by Srinivas Warren MD on   5/17/21 at 9:09 AM EDT    Please note that this chart was generated using voice recognition Dragon dictation software. Although every effort was made to ensure the accuracy of this automated transcription, some errors in transcription may have occurred.

## 2021-05-17 NOTE — PROGRESS NOTES
Occupational Therapy   Occupational Therapy Initial Assessment  Date: 2021   Patient Name: Fay Alford  MRN: [id number]     : 1955    Date of Service: 2021  Obtained from medical chart:  78-year-old male with history of smoking, alcohol abuse, COPD presented to ER with progressive worsening of abdominal swelling associated with fatigue, abdominal pain and yellowing of skin. Patient also report of rectal bleed both fresh and old clots. report noticing blood after wiping the last 5 days associated with painful defecation. Patient denies any history of fever chills, nausea, vomiting. His last drink was 5 days ago. Discharge Recommendations:    No therapy recommended at discharge. Assessment   Assessment: Patient completing tasks at Supervision level with report of abdominal pain when lying down, but not impacting ADL performance. OT to defer at this time, please reconsult if functional deficits arise and ADL status has regressed from baseline functional status. Prognosis: Good  Decision Making: Low Complexity  Patient Education: OT role, OT POC, purpose of evaluation, adapative ADL techs - good return  REQUIRES OT FOLLOW UP: No  Activity Tolerance  Activity Tolerance: Patient Tolerated treatment well  Safety Devices  Safety Devices in place: Yes  Type of devices: Gait belt;Call light within reach; Left in bed;Bed alarm in place  Restraints  Initially in place: No           Patient Diagnosis(es): The primary encounter diagnosis was Hyperbilirubinemia. A diagnosis of Alcoholic liver failure (HCC) was also pertinent to this visit. has a past medical history of Anxiety, Asthma, Blind left eye, Cervical spine fracture ([facility] COPD (chronic obstructive pulmonary disease) ([facility] Depression, H/O: substance abuse ([facility] Hep C w/o coma, chronic ([facility] Hepatitis C, History of suicidal tendencies, Hx: UTI (urinary tract infection), and Schizophrenia, schizo-affective ([facility]    has a past surgical history that includes Umbilical hernia repair; Cataract removal with implant; Vein Surgery; hernia repair; Eye surgery (Left, 8/27/13); vitrectomy (Left, 08/27/2013); and eye surgery (Left, 9/24/13). Restrictions  Restrictions/Precautions  Restrictions/Precautions: Up as Tolerated  Required Braces or Orthoses?: No    Subjective   General  Patient assessed for rehabilitation services?: Yes  Family / Caregiver Present: No  General Comment  Comments: RN ok'd patient for OT/PT evaluation. Pt pleasant and cooperative throughout. Patient Currently in Pain: Yes  Pain Assessment  Pain Assessment: Faces  Pain Level: 9  Pain Type: Acute pain  Pain Location: Abdomen  Non-Pharmaceutical Pain Intervention(s): Ambulation/Increased Activity; Distraction; Therapeutic presence  Response to Pain Intervention: Patient Satisfied  Vital Signs  Patient Currently in Pain: Yes    Social/Functional History  Social/Functional History  Lives With: Daughter  Type of Home: Apartment (3rd floor)  Home Layout: One level  Home Access: Stairs to enter with rails  Entrance Stairs - Number of Steps: 2 flights of stairs  Entrance Stairs - Rails: Right  Bathroom Shower/Tub: Tub/Shower unit  Bathroom Toilet: Standard  Bathroom Accessibility: Accessible  ADL Assistance: Independent  Homemaking Assistance: Independent  Homemaking Responsibilities: Yes (Daughter completes)  Meal Prep Responsibility: Secondary  Laundry Responsibility: Secondary  Cleaning Responsibility: Secondary  Shopping Responsibility: Secondary  Ambulation Assistance: Independent  Transfer Assistance: Independent  Active : No  Patient's  Info: daughter drives  Occupation: On disability  Leisure & Hobbies: Chess and [employer]       Objective   Vision:  (Glass eye and is supposed to have glasses but broke them)  Hearing: Within functional limits       Balance  Sitting Balance: Independent (EOB for ~10 min)  Standing Balance: Supervision  Functional Mobility  Functional - Mobility Device: No device  Activity: Other  Assist Level: Supervision  ADL  Feeding: Independent  Grooming: Independent  UE Bathing: Modified independent   LE Bathing: Supervision  UE Dressing: Modified independent   LE Dressing: Supervision (OT facilitated doffing/donning socks sitting EOB at Supervision)  Toileting: Supervision  Tone RUE  RUE Tone: Normotonic  Tone LUE  LUE Tone: Normotonic  Coordination  Movements Are Fluid And Coordinated: Yes     Bed mobility  Supine to Sit: Stand by assistance  Sit to Supine: Supervision  Scooting: Supervision  Transfers  Sit to stand: Supervision  Stand to sit: Supervision     Cognition  Overall Cognitive Status: WFL        Sensation  Overall Sensation Status: Impaired      LUE AROM : WFL  Left Hand AROM: WFL  RUE AROM : WFL  Right Hand AROM: WFL  LUE Strength  Gross LUE Strength: WFL  L Hand General: 4+/5  RUE Strength  Gross RUE Strength: WFL  R Hand General: 4+/5      Plan        AM-PAC Score        AM-[facility] Inpatient Daily Activity Raw Score: 21 (05/17/21 1129)  AM-PAC Inpatient ADL T-Scale Score : 44.27 (05/17/21 1129)  ADL Inpatient CMS 0-100% Score: 32.79 (05/17/21 1129)  ADL Inpatient CMS G-Code Modifier : CJ (05/17/21 1129)    Goals          Therapy Time   Individual Concurrent Group Co-treatment   Time In 1016         Time Out 1033         Minutes 17         Timed Code Treatment Minutes: 8 Minutes     ALANA Cohen/TARAS

## 2021-05-17 NOTE — CONSULTS
EGD or colonoscopy. Hemoglobin on admission was 15.3 g/dL. Platelets 96, INR 3.3. Patient underwent diagnostic paracentesis in the ED consistent with ascites associated with hepatic cirrhosis  No SBP. WBC total 81  SAAG 2.5  Total protein < 1.0      Summary of labs completed on admission:  Alkaline phosphatase 211, , , T bilirubin 18.28  WBCs 9.0, hemoglobin 15.3, MCV, 97.2, platelets 96.   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    Cervical spine fracture (HCC)     C7, after fall, no surgery    COPD [facility] obstructive [facility] disease) [facility]     Depression     H/O: substance abuse ([facility]     etoh,marijuana    Hep C w/o coma, chronic ([facility] [facility] 10/15/2014    Hepatitis C     History of suicidal tendencies     Hx: UTI (urinary tract infection)     Schizophrenia, schizo-affective (HCC)      Past Surgical History:   Procedure Laterality Date    CATARACT REMOVAL WITH IMPLANT      Left eye    EYE SURGERY Left 8/27/13    repair of ruptured globe; vitrectomy    EYE SURGERY Left 9/24/13    enuculation    HERNIA REPAIR      inguinal    UMBILICAL HERNIA REPAIR      VEIN SURGERY      Leg vein    VITRECTOMY Left 08/27/2013    and globe repair       ALLERGIES:  Allergies   Allergen Reactions    Pcn [Penicillins] Swelling    Vicodin [Hydrocodone-Acetaminophen] Swelling    Motrin [Ibuprofen] Swelling       HOME MEDICATIONS:  Prior to Admission medications    Not on File       CURRENT MEDICATIONS:  Scheduled Meds:   budesonide-formoterol  2 puff Inhalation BID    polyethylene glycol  4,000 mL Oral Once    phytonadione (VITAMIN K)  IVPB  10 mg Intravenous Once    sodium chloride flush  5-40 mL Intravenous 2 times per day    cefTRIAXone (ROCEPHIN) IV  1,000 mg Intravenous Q24H    nicotine  1 patch Transdermal Daily    pantoprazole  40 mg Oral QAM AC    furosemide  20 mg Oral Daily    spironolactone  25 mg Oral Daily    lactulose  20 g Oral TID     Continuous Infusions:   sodium chloride       PRN Meds:ipratropium-albuterol, sodium chloride flush, sodium chloride, promethazine **OR** ondansetron    SOCIAL HISTORY:     Tobacco:   reports that he has been smoking cigarettes. He has a 150.00 pack-year smoking history. He does not have any smokeless tobacco history on file. Alcohol:   reports current alcohol use of about 28.0 standard drinks of alcohol per week. Illicit drugs:  reports current drug use. Drug: Marijuana. FAMILY HISTORY:     History reviewed. No pertinent family history. REVIEW OF SYSTEMS:    Constitutional: No fever, no chills, no lethargy, no weakness. HEENT:  No headache, otalgia, itchy eyes, nasal discharge or sore throat. Cardiac:  No chest pain, dyspnea, orthopnea or PND. Chest:   No cough, phlegm or wheezing. Abdomen:  No abdominal pain, nausea or vomiting. + rectal bleeding, + constipation, + abdominal swelling  Neuro:  No focal weakness, abnormal movements or seizure like activity. Skin:   No rashes, no itching. :   No hematuria, no pyuria, no dysuria, no flank pain. Extremities:  + LE swelling or joint pains. ROS was otherwise negative except as mentioned in the [address] PHYSICAL EXAM:    BP (!) 167/87   Pulse 68   Temp 97.3 °F (36.3 °C) (Oral)   Resp 18   Ht 6' 1\" (1.854 m)   Wt 180 lb (81.6 kg)   SpO2 96%   BMI 23.75 kg/m²     GENERAL: Chronically ill, under nourished, No apparent distress  HEAD:   Normocephalic, Atraumatic, bilateral temporal wasting  EENT:   Sclera right eye icteric, left eye prosthesis,  oropharynx moist   NECK:   Supple, Trachea midline  LUNGS:  CTA Bilaterally  HEART:  RRR, No murmur  ABDOMEN:   Soft, Nontender, distended with   tense ascites, BS WNL  EXT:   No clubbing. No cyanosis. ++ Bilat LE edema. SKIN:   No rashes.   No jaundice. No stigmata of liver disease.     MUSC/SKEL:   Decreased muscle bulk for patient's age, No significant synovitis, No deformities  NEURO:  A&O x Three, CN II- XII grossly intact, + asterixis      LABS AND IMAGING:     CBC  Recent Labs     05/16/21  1147 05/17/21  0314   WBC 9.0 6.5   HGB 15.3 13.4   HCT 44.5 37.9*   MCV 97.2 95.5   MCH 33.4 33.8*   MCHC 34.4 35.4*   PLT See Reflexed IPF Result See Reflexed IPF Result       IMMATURE PLTs  Lab Results   Component Value Date    PLTFLUORE 63 05/17/2021    PLTFLUORE 96 05/16/2021       BMP  Recent Labs     05/16/21  1147 05/17/21  0314   * 129*   K 4.3 3.9   CL 97* 100   CO2 22 20   BUN 14 16   CREATININE 0.30* 0.60*   GLUCOSE 87 94   CALCIUM 8.5* 7.9*       LFTS  Recent Labs     05/16/21  1147 05/17/21  0314   ALKPHOS 211* 161*   * 156*   * 174*   PROT 6.7 5.4*   BILITOT 18.28* 14.80*   LABALBU 2.7* 2.2*       AMYLASE/LIPASE/AMMONIA  Recent Labs     05/17/21  1445   AMMONIA 52       PT/INR  Recent Labs     05/16/21  1147 05/17/21  0314   PROTIME 32.4* 32.5*   INR 3.3 3.4     Acute Hepatitis Panel   Lab Results   Component Value Date    HEPBSAG NONREACTIVE 05/16/2021    HEPCAB REACTIVE 05/16/2021    HEPBIGM NONREACTIVE 05/16/2021    HEPAIGM NONREACTIVE 05/16/2021       HCV Genotype   No results found for: HEPATITISCGENOTYPE    HCV Quantitative   No results found for: HCVQNT    LIVER WORK UP    ACETAMINOPHEN   Lab Results   Component Value Date    ACTMNPHEN <10 07/31/2013       AFP  Lab Results   Component Value Date    AFP 22.2 05/16/2021       Alpha 1 antitrypsin   No results found for: A1A    Anti - Liver/Kidney Ab  No results found for: LIVER-KIDNEYMICROSOMALAB    RADHA  Lab Results   Component Value Date    RADHA POSITIVE 05/16/2021       AMA  No results found for: MITOAB    ASMA  No results found for: SMOOTHMUSCAB    Ceruloplasmin  No results found for: CERULOPLSM    Celiac panel  No results found for: TISSTRNTIIGG, TTGIGA, IGA    IgG  No results found for: IGG    IgM  No results found for: IGM    GGT   No results found for: LABGGT      ANEMIA STUDIES  Recent Labs     05/16/21  1147   IRON 129   LABIRON 71*   TIBC 181*   UIBC 52*   FERRITIN 3,019*       IMAGING  CT CHEST ABDOMEN PELVIS W CONTRAST    Result Date: 5/16/2021  EXAMINATION: CT OF THE CHEST, ABDOMEN, AND PELVIS WITH CONTRAST 5/16/2021 12:34 pm TECHNIQUE: CT of the chest, abdomen and pelvis was performed with the administration of intravenous contrast. Multiplanar reformatted images are provided for review. Dose modulation, iterative reconstruction, and/or weight based adjustment of the mA/kV was utilized to reduce the radiation dose to as low as reasonably achievable. COMPARISON: 05/05/2019 HISTORY: ORDERING SYSTEM PROVIDED HISTORY: new sob, evidence of liver failure, rectal bleeding abdominal pain, bloating TECHNOLOGIST PROVIDED HISTORY: new sob, evidence of liver failure, rectal bleeding abdominal pain, bloating Decision Support Exception - unselect if not a suspected or confirmed emergency medical condition->Emergency Medical Condition (MA) Reason for Exam: bloated, abdominal pain , nausea Acuity: Unknown Type of Exam: Unknown FINDINGS: Chest: Mediastinum: The heart and great vessels are normal in size. Calcified atheromatous plaque and coronary calcifications are noted. No pericardial effusion. No enlarged or suspicious-appearing lymph nodes are identified. Lungs/pleura: Paraseptal and centrilobular emphysematous disease. Subsegmental atelectasis or scarring in the lung bases. No consolidation. No evidence for edema. No effusion. The central airway is patent. Soft Tissues/Bones: No acute findings. Abdomen/Pelvis: Organs: The liver is small with morphologic findings of cirrhosis. Tiny less than 5 mm hypoattenuating liver lesion in the dome of segment 4 is too small to characterize.   This is not clearly demonstrated on the prior exam, which may have been due to the absence of contrast.  The spleen is normal in size. The portal venous system is patent. The gallbladder and biliary tree reveal no acute findings. The pancreas, adrenals and kidneys reveal no acute findings. GI/Bowel: No evidence for bowel obstruction. Edematous appearance of the proximal colon is noted, which may be related to portal hypertensive colopathy. Normal appendix. Pelvis: No acute findings. Peritoneum/Retroperitoneum: Moderate volume abdominopelvic ascites. No loculated fluid collection. No free air. The aorta is normal in caliber. Bones/Soft Tissues: No acute osseous abnormality identified. Advanced disc disease at L5-S1.     1.  Emphysema and subsegmental atelectasis or scarring in the lung bases. 2.  Morphologic findings of cirrhosis are demonstrated. Less than 5 mm hypoattenuating liver lesion in the central left hepatic lobe is too small to characterize. Attention to on follow-up is recommended. 3.  Moderate volume abdominopelvic ascites. 4.  Edematous appearance of the proximal colon, favored to represent portal hypertensive colopathy versus inflammatory colitis. IMPRESSION:     1. Decompensated alcoholic/hep C cirrhosis with ascites  MELD - 33  2. Abnormal LFTs - alcoholic hepatitis , ? Hemochromatosis  MDF - 112 - poor prognosis  3. Ascites - secondary to portal hypertension  Diagnostic tap in ED- No SBP . Cytology pending  4. Rectal bleeding - DDX hemorrhoids, rectal varices, CRC,  5.  Elevated INR -secondary to synthetic dysfunction/liver disease  6. Thrombocytopenia -secondary to liver disease  7. Hyponatremia-secondary to beer portal shantell and alcoholism  8. Alcohol use disorder  9. Liver lesion less than 5 mm -seen on CT but not seen on ultrasound. AFP 22.2. Old records, labs and imaging reviewed. PLAN   1. We will plan for EGD tomorrow for variceal screening and colonoscopy to eval source of rectal bleeding. 2.  Clear liquid diet then n.p.o. after midnight.   Patient will need eventual low-sodium diet with fluid restriction. 3. Vitamin K 10 mg IV x1. Recheck INR in a.m. 4.  Continue lactulose 20 g 3 times daily  5. Continue Lasix 20 mg daily and Aldactone 25 mg daily for now  6. IR  for therapeutic paracentesis  7. Consider eventual MRI of liver to rule out liver lesion. This plan was formulated in collaboration with    Thank you for allowing us to participate in the care of your patient. Electronically signed by: PEEWEE Mcdermott CNP-CNP on 5/17/2021 at 6:30 PM     Please note that this note was generated using a voice recognition dictation software. Although every effort was made to ensure the accuracy of this automated transcription, some errors in transcription may have occurred.

## 2021-05-17 NOTE — TELEPHONE ENCOUNTER
Darryl. phone from [facility], stating she needs the pt's last office visit, due to pt being discharged from hospital & setting up home care w/ DUY[facility] See to follow for home care.  Faxed to [phone]

## 2021-05-18 ENCOUNTER — ANESTHESIA (OUTPATIENT)
Dept: ENDOSCOPY | Age: 66
DRG: 280 | End: 2021-05-18
Payer: MEDICARE

## 2021-05-18 ENCOUNTER — ANESTHESIA EVENT (OUTPATIENT)
Dept: ENDOSCOPY | Age: 66
DRG: 280 | End: 2021-05-18
Payer: MEDICARE

## 2021-05-18 LAB
ABO/RH: NORMAL
ABSOLUTE EOS #: 0.04 K/UL (ref 0–0.44)
ABSOLUTE IMMATURE GRANULOCYTE: 0.11 K/UL (ref 0–0.3)
ABSOLUTE LYMPH #: 0.95 K/UL (ref 1.1–3.7)
ABSOLUTE MONO #: 1.01 K/UL (ref 0.1–1.2)
ALBUMIN SERPL-MCNC: 2.6 G/DL (ref 3.5–5.2)
ALBUMIN/GLOBULIN RATIO: 0.7 (ref 1–2.5)
ALP BLD-CCNC: 182 U/L (ref 40–129)
ALT SERPL-CCNC: 183 U/L (ref 5–41)
ANION GAP SERPL CALCULATED.3IONS-SCNC: 13 MMOL/L (ref 9–17)
ANTIBODY SCREEN: NEGATIVE
ARM BAND NUMBER: NORMAL
AST SERPL-CCNC: 215 U/L
BASOPHILS # BLD: 1 % (ref 0–2)
BASOPHILS ABSOLUTE: 0.06 K/UL (ref 0–0.2)
BILIRUB SERPL-MCNC: 18.87 MG/DL (ref 0.3–1.2)
BLD PROD TYP BPU: NORMAL
BUN BLDV-MCNC: 17 MG/DL (ref 8–23)
BUN/CREAT BLD: ABNORMAL (ref 9–20)
CALCIUM SERPL-MCNC: 8.4 MG/DL (ref 8.6–10.4)
CHLORIDE BLD-SCNC: 99 MMOL/L (ref 98–107)
CO2: 20 MMOL/L (ref 20–31)
CREAT SERPL-MCNC: 0.22 MG/DL (ref 0.7–1.2)
CROSSMATCH RESULT: NORMAL
DIFFERENTIAL TYPE: ABNORMAL
DISPENSE STATUS BLOOD BANK: NORMAL
EOSINOPHILS RELATIVE PERCENT: 1 % (ref 1–4)
EXPIRATION DATE: NORMAL
GFR AFRICAN AMERICAN: >60 ML/MIN
GFR NON-AFRICAN AMERICAN: >60 ML/MIN
GFR SERPL CREATININE-BSD FRML MDRD: ABNORMAL ML/MIN/{1.73_M2}
GFR SERPL CREATININE-BSD FRML MDRD: ABNORMAL ML/MIN/{1.73_M2}
GLUCOSE BLD-MCNC: 89 MG/DL (ref 70–99)
HCT VFR BLD CALC: 41.9 % (ref 40.7–50.3)
HEMOGLOBIN: 14.8 G/DL (ref 13–17)
IMMATURE GRANULOCYTES: 1 %
INR BLD: 3.1
INR BLD: 3.3
INR BLD: 3.6
LACTIC ACID, WHOLE BLOOD: 2.4 MMOL/L (ref 0.7–2.1)
LACTIC ACID, WHOLE BLOOD: 3.2 MMOL/L (ref 0.7–2.1)
LACTIC ACID, WHOLE BLOOD: 3.6 MMOL/L (ref 0.7–2.1)
LACTIC ACID: ABNORMAL MMOL/L
LYMPHOCYTES # BLD: 12 % (ref 24–43)
MCH RBC QN AUTO: 33.7 PG (ref 25.2–33.5)
MCHC RBC AUTO-ENTMCNC: 35.3 G/DL (ref 28.4–34.8)
MCV RBC AUTO: 95.4 FL (ref 82.6–102.9)
MONOCYTES # BLD: 13 % (ref 3–12)
NRBC AUTOMATED: 0 PER 100 WBC
PDW BLD-RTO: 16.4 % (ref 11.8–14.4)
PLATELET # BLD: ABNORMAL K/UL (ref 138–453)
PLATELET ESTIMATE: ABNORMAL
PLATELET, FLUORESCENCE: 76 K/UL (ref 138–453)
PLATELET, IMMATURE FRACTION: 3.3 % (ref 1.1–10.3)
PMV BLD AUTO: ABNORMAL FL (ref 8.1–13.5)
POTASSIUM SERPL-SCNC: 3.6 MMOL/L (ref 3.7–5.3)
PROTHROMBIN TIME: 29.8 SEC (ref 9.1–12.3)
PROTHROMBIN TIME: 31.6 SEC (ref 9.1–12.3)
PROTHROMBIN TIME: 34.6 SEC (ref 9.1–12.3)
RBC # BLD: 4.39 M/UL (ref 4.21–5.77)
RBC # BLD: ABNORMAL 10*6/UL
SEG NEUTROPHILS: 73 % (ref 36–65)
SEGMENTED NEUTROPHILS ABSOLUTE COUNT: 5.8 K/UL (ref 1.5–8.1)
SODIUM BLD-SCNC: 132 MMOL/L (ref 135–144)
SURGICAL PATHOLOGY REPORT: NORMAL
TOTAL PROTEIN: 6.3 G/DL (ref 6.4–8.3)
TRANSFUSION STATUS: NORMAL
UNIT DIVISION: 0
UNIT NUMBER: NORMAL
WBC # BLD: 8 K/UL (ref 3.5–11.3)
WBC # BLD: ABNORMAL 10*3/UL

## 2021-05-18 PROCEDURE — 80053 COMPREHEN METABOLIC PANEL: CPT

## 2021-05-18 PROCEDURE — 6370000000 HC RX 637 (ALT 250 FOR IP): Performed by: STUDENT IN AN ORGANIZED HEALTH CARE EDUCATION/TRAINING PROGRAM

## 2021-05-18 PROCEDURE — 1200000000 HC SEMI PRIVATE

## 2021-05-18 PROCEDURE — 6360000002 HC RX W HCPCS: Performed by: INTERNAL MEDICINE

## 2021-05-18 PROCEDURE — 85610 PROTHROMBIN TIME: CPT

## 2021-05-18 PROCEDURE — 99232 SBSQ HOSP IP/OBS MODERATE 35: CPT | Performed by: INTERNAL MEDICINE

## 2021-05-18 PROCEDURE — 76937 US GUIDE VASCULAR ACCESS: CPT

## 2021-05-18 PROCEDURE — 94640 AIRWAY INHALATION TREATMENT: CPT

## 2021-05-18 PROCEDURE — 36415 COLL VENOUS BLD VENIPUNCTURE: CPT

## 2021-05-18 PROCEDURE — 6370000000 HC RX 637 (ALT 250 FOR IP): Performed by: INTERNAL MEDICINE

## 2021-05-18 PROCEDURE — 83605 ASSAY OF LACTIC ACID: CPT

## 2021-05-18 PROCEDURE — 2580000003 HC RX 258: Performed by: INTERNAL MEDICINE

## 2021-05-18 PROCEDURE — 2580000003 HC RX 258: Performed by: STUDENT IN AN ORGANIZED HEALTH CARE EDUCATION/TRAINING PROGRAM

## 2021-05-18 PROCEDURE — 85025 COMPLETE CBC W/AUTO DIFF WBC: CPT

## 2021-05-18 PROCEDURE — 81256 HFE GENE: CPT

## 2021-05-18 PROCEDURE — 85055 RETICULATED PLATELET ASSAY: CPT

## 2021-05-18 PROCEDURE — APPSS45 APP SPLIT SHARED TIME 31-45 MINUTES: Performed by: INTERNAL MEDICINE

## 2021-05-18 PROCEDURE — 6360000002 HC RX W HCPCS: Performed by: STUDENT IN AN ORGANIZED HEALTH CARE EDUCATION/TRAINING PROGRAM

## 2021-05-18 RX ORDER — FENTANYL CITRATE 50 UG/ML
25 INJECTION, SOLUTION INTRAMUSCULAR; INTRAVENOUS EVERY 4 HOURS PRN
Status: DISCONTINUED | OUTPATIENT
Start: 2021-05-18 | End: 2021-05-20 | Stop reason: HOSPADM

## 2021-05-18 RX ORDER — KETOROLAC TROMETHAMINE 30 MG/ML
15 INJECTION, SOLUTION INTRAMUSCULAR; INTRAVENOUS ONCE
Status: COMPLETED | OUTPATIENT
Start: 2021-05-18 | End: 2021-05-18

## 2021-05-18 RX ORDER — NALOXONE HYDROCHLORIDE 0.4 MG/ML
0.4 INJECTION, SOLUTION INTRAMUSCULAR; INTRAVENOUS; SUBCUTANEOUS PRN
Status: DISCONTINUED | OUTPATIENT
Start: 2021-05-18 | End: 2021-05-20 | Stop reason: HOSPADM

## 2021-05-18 RX ADMIN — POLYETHYLENE GLYCOL 3350, SODIUM SULFATE ANHYDROUS, SODIUM BICARBONATE, SODIUM CHLORIDE, POTASSIUM CHLORIDE 2000 ML: 236; 22.74; 6.74; 5.86; 2.97 POWDER, FOR SOLUTION ORAL at 17:05

## 2021-05-18 RX ADMIN — FENTANYL CITRATE 25 MCG: 50 INJECTION, SOLUTION INTRAMUSCULAR; INTRAVENOUS at 17:14

## 2021-05-18 RX ADMIN — PHYTONADIONE 10 MG: 10 INJECTION, EMULSION INTRAMUSCULAR; INTRAVENOUS; SUBCUTANEOUS at 19:53

## 2021-05-18 RX ADMIN — SPIRONOLACTONE 25 MG: 25 TABLET ORAL at 09:06

## 2021-05-18 RX ADMIN — FUROSEMIDE 20 MG: 20 TABLET ORAL at 09:06

## 2021-05-18 RX ADMIN — LACTULOSE 20 G: 20 SOLUTION ORAL at 21:21

## 2021-05-18 RX ADMIN — BUDESONIDE AND FORMOTEROL FUMARATE DIHYDRATE 2 PUFF: 80; 4.5 AEROSOL RESPIRATORY (INHALATION) at 11:25

## 2021-05-18 RX ADMIN — CEFTRIAXONE SODIUM 1000 MG: 1 INJECTION, POWDER, FOR SOLUTION INTRAMUSCULAR; INTRAVENOUS at 21:33

## 2021-05-18 RX ADMIN — LACTULOSE 20 G: 20 SOLUTION ORAL at 13:54

## 2021-05-18 RX ADMIN — BUDESONIDE AND FORMOTEROL FUMARATE DIHYDRATE 2 PUFF: 80; 4.5 AEROSOL RESPIRATORY (INHALATION) at 19:23

## 2021-05-18 RX ADMIN — KETOROLAC TROMETHAMINE 15 MG: 30 INJECTION, SOLUTION INTRAMUSCULAR; INTRAVENOUS at 14:10

## 2021-05-18 RX ADMIN — LACTULOSE 20 G: 20 SOLUTION ORAL at 00:58

## 2021-05-18 RX ADMIN — PHYTONADIONE 10 MG: 10 INJECTION, EMULSION INTRAMUSCULAR; INTRAVENOUS; SUBCUTANEOUS at 08:34

## 2021-05-18 RX ADMIN — LACTULOSE 20 G: 20 SOLUTION ORAL at 09:06

## 2021-05-18 RX ADMIN — FENTANYL CITRATE 25 MCG: 50 INJECTION, SOLUTION INTRAMUSCULAR; INTRAVENOUS at 21:18

## 2021-05-18 ASSESSMENT — PAIN SCALES - GENERAL
PAINLEVEL_OUTOF10: 7
PAINLEVEL_OUTOF10: 7
PAINLEVEL_OUTOF10: 8
PAINLEVEL_OUTOF10: 7
PAINLEVEL_OUTOF10: 7
PAINLEVEL_OUTOF10: 8

## 2021-05-18 ASSESSMENT — PAIN DESCRIPTION - ORIENTATION: ORIENTATION: LOWER;MID

## 2021-05-18 ASSESSMENT — PAIN DESCRIPTION - LOCATION: LOCATION: ABDOMEN

## 2021-05-18 NOTE — PROGRESS NOTES
[facility]. [facility] Progress Note    Zahira Reyes is a 77 y.o. male patient. Hospitalization Day:2      Chief consult reason: Cirrhosis      Subjective: Patient seen and examined. Patient denies any further rectal bleeding. No acute events overnight. IV access was lost and patient was unable to receive vitamin K IV. Plan was for EGD and colonoscopy today however INR remains elevated. Plan is for vitamin K today      Objective:   VITALS:  /78   Pulse 96   Temp 98.3 °F (36.8 °C) (Oral)   Resp 16   Ht 6' 1\" (1.854 m)   Wt 180 lb (81.6 kg)   SpO2 100%   BMI 23.75 kg/m²   TEMPERATURE:  Current - Temp: 98.3 °F (36.8 °C);  Max - Temp  Av.7 °F (36.5 °C)  Min: 97.1 °F (36.2 °C)  Max: 98.3 °F (36.8 °C)    Physical Assessment:  General appearance:  alert, cooperative and no distress  Mental Status:  oriented to person, place and time and normal affect  Lungs:  clear to auscultation bilaterally, normal effort  Heart:  regular rate and rhythm, no murmur  Abdomen:  soft, nontender, distended with tense ascites, normal bowel sounds, no masses  Extremities:  ++ bilat LE edema, no redness, No clubbing  Skin:  warm, dry, no gross lesions or rashes    CURRENT MEDICATIONS:  Scheduled Meds:   phytonadione (VITAMIN K)  IVPB  10 mg Intravenous Once    budesonide-formoterol  2 puff Inhalation BID    ketorolac  15 mg Intravenous Once    sodium chloride flush  5-40 mL Intravenous 2 times per day    cefTRIAXone (ROCEPHIN) IV  1,000 mg Intravenous Q24H    nicotine  1 patch Transdermal Daily    pantoprazole  40 mg Oral QAM AC    furosemide  20 mg Oral Daily    spironolactone  25 mg Oral Daily    lactulose  20 g Oral TID     Continuous Infusions:   sodium chloride       PRN Meds:ipratropium-albuterol, sodium chloride flush, sodium chloride, promethazine **OR** ondansetron      Data Review:  LABS and IMAGING:     CBC  Recent Labs     21  1147 21  0314 21  0505   WBC 9.0 6.5 8. 0   HGB 15.3 13.4 14.8   HCT 44.5 37.9* 41.9   MCV 97.2 95.5 95.4   MCHC 34.4 35.4* 35.3*   RDW 16.4* 16.1* 16.4*   PLT See Reflexed IPF Result See Reflexed IPF Result See Reflexed IPF Result       Immature PLTs   Lab Results   Component Value Date    PLTFLUORE 76 05/18/2021    PLTFLUORE 63 05/17/2021    PLTFLUORE 96 05/16/2021       ANEMIA STUDIES  Recent Labs     05/16/21  1147   LABIRON 71*   TIBC 181*   IRON 129   FERRITIN 3,019*       BMP  Recent Labs     05/16/21  1147 05/17/21  0314 05/18/21  0505   * 129* 132*   K 4.3 3.9 3.6*   CL 97* 100 99   CO2 22 20 20   BUN 14 16 17   CREATININE 0.30* 0.60* 0.22*   GLUCOSE 87 94 89   CALCIUM 8.5* 7.9* 8.4*       LFTS  Recent Labs     05/16/21  1147 05/17/21  0314 05/18/21  0505   ALKPHOS 211* 161* 182*   * 156* 183*   * 174* 215*   BILITOT 18.28* 14.80* 18.87*   LABALBU 2.7* 2.2* 2.6*       AMYLASE/LIPASE/AMMONIA  Recent Labs     05/17/21  1445   AMMONIA 52       Acute Hepatitis Panel   Lab Results   Component Value Date    HEPBSAG NONREACTIVE 05/16/2021    HEPCAB REACTIVE 05/16/2021    HEPBIGM NONREACTIVE 05/16/2021    HEPAIGM NONREACTIVE 05/16/2021       HCV Genotype   No results found for: HEPATITISCGENOTYPE    HCV Quantitative   No results found for: HCVQNT    LIVER WORK UP:    AFP  Lab Results   Component Value Date    AFP 22.2 05/16/2021       Alpha 1 antitrypsin   No results found for: A1A    Anti - Liver/Kidney Ab  No results found for: LIVER-KIDNEYMICROSOMALAB    RADHA  Lab Results   Component Value Date    RADHA POSITIVE 05/16/2021       AMA  No results found for: MITOAB    ASMA  No results found for: SMOOTHMUSCAB    Ceruloplasmin  No results found for: CERULOPLSM    Celiac panel  No results found for: TISSTRNTIIGG, TTGIGA, IGA    IgG  No results found for: IGG    IgM  No results found for: IGM    GGT   No results found for: LABGGT    PT/INR  Recent Labs     05/17/21  0314 05/18/21  0505 05/18/21  1131   PROTIME 32.5* 31.6* 34.6*   INR 3.4 3.3 3.6       Cancer Markers:  CEA:  No results for input(s): CEA in the last 72 hours. Ca 125:  No results for input(s):  in the last 72 hours. Ca 19-9:   No results for input(s):  in the last 72 hours. AFP:   Recent Labs     05/16/21  1147   AFP 22.2*       Lactic acid:  Recent Labs     05/17/21  2317 05/18/21  0403 05/18/21  0840   LACTACIDWB 3.9* 3.2* 2.4*         Radiology Review:    US ABDOMEN LIMITED Specify organ? LIVER    Result Date: 5/17/2021  EXAMINATION: RIGHT UPPER QUADRANT ULTRASOUND 5/17/2021 9:20 am COMPARISON: CT 05/16/2021 HISTORY: ORDERING SYSTEM PROVIDED HISTORY: to rule thromobosis, cirrhosis ? TECHNOLOGIST PROVIDED HISTORY: to rule thromobosis, cirrhosis ? Specify organ?->LIVER FINDINGS: LIVER:  Nodular cirrhotic liver. Liver length 14 cm. No focal liver lesion is demonstrated. A less than 5 mm hypodense liver lesion reported on the previous CT scan is not demonstrated on this exam.  MR imaging may be considered. BILIARY SYSTEM:  There is no intrahepatic biliary ductal dilatation. The common bile duct is not dilated. Measures 2-3 mm. RIGHT KIDNEY: The right kidney is grossly unremarkable without evidence of hydronephrosis. PANCREAS:  Visualized portions of the pancreas are unremarkable. OTHER: Color and spectral Doppler imaging was performed. Normal waveform patterns noted in the main portal as well as left right portal vein branches. Peak systolic velocities are low. Peak systolic velocity in the main portal vein is 7.9 cm/S. The hepatic veins also patent. No sonographic evidence for portal vein thrombosis. There is hepatic pedal flow in the portal vein. Large volume ascites. 1. Nodular cirrhotic liver. 2. Large volume ascites. Mild gallbladder wall thickening attributed to hypoproteinemic state from the cirrhosis and ascites. No cholelithiasis. 3. No sonographic evidence for portal vein thrombosis. There is normal directional flow in the portal vein.  4. The less than 5 mm hypodense lesion reported on prior CT scan is not sonographically apparent. Consider further imaging with MRI to definitively exclude the lesion. CT CHEST ABDOMEN PELVIS W CONTRAST    Result Date: 5/16/2021  EXAMINATION: CT OF THE CHEST, ABDOMEN, AND PELVIS WITH CONTRAST 5/16/2021 12:34 pm TECHNIQUE: CT of the chest, abdomen and pelvis was performed with the administration of intravenous contrast. Multiplanar reformatted images are provided for review. Dose modulation, iterative reconstruction, and/or weight based adjustment of the mA/kV was utilized to reduce the radiation dose to as low as reasonably achievable. COMPARISON: 05/05/2019 HISTORY: ORDERING SYSTEM PROVIDED HISTORY: new sob, evidence of liver failure, rectal bleeding abdominal pain, bloating TECHNOLOGIST PROVIDED HISTORY: new sob, evidence of liver failure, rectal bleeding abdominal pain, bloating Decision Support Exception - unselect if not a suspected or confirmed emergency medical condition->Emergency Medical Condition (MA) Reason for Exam: bloated, abdominal pain , nausea Acuity: Unknown Type of Exam: Unknown FINDINGS: Chest: Mediastinum: The heart and great vessels are normal in size. Calcified atheromatous plaque and coronary calcifications are noted. No pericardial effusion. No enlarged or suspicious-appearing lymph nodes are identified. Lungs/pleura: Paraseptal and centrilobular emphysematous disease. Subsegmental atelectasis or scarring in the lung bases. No consolidation. No evidence for edema. No effusion. The central airway is patent. Soft Tissues/Bones: No acute findings. Abdomen/Pelvis: Organs: The liver is small with morphologic findings of cirrhosis. Tiny less than 5 mm hypoattenuating liver lesion in the dome of segment 4 is too small to characterize. This is not clearly demonstrated on the prior exam, which may have been due to the absence of contrast.  The spleen is normal in size.  The portal venous system is patent. The gallbladder and biliary tree reveal no acute findings. The pancreas, adrenals and kidneys reveal no acute findings. GI/Bowel: No evidence for bowel obstruction. Edematous appearance of the proximal colon is noted, which may be related to portal hypertensive colopathy. Normal appendix. Pelvis: No acute findings. Peritoneum/Retroperitoneum: Moderate volume abdominopelvic ascites. No loculated fluid collection. No free air. The aorta is normal in caliber. Bones/Soft Tissues: No acute osseous abnormality identified. Advanced disc disease at L5-S1.     1.  Emphysema and subsegmental atelectasis or scarring in the lung bases. 2.  Morphologic findings of cirrhosis are demonstrated. Less than 5 mm hypoattenuating liver lesion in the central left hepatic lobe is too small to characterize. Attention to on follow-up is recommended. 3.  Moderate volume abdominopelvic ascites. 4.  Edematous appearance of the proximal colon, favored to represent portal hypertensive colopathy versus inflammatory colitis. ENDOSCOPY     Principal Problem:    Alcoholic liver failure (HCC)  Active Problems:    COPD (chronic obstructive pulmonary disease) (HCC)    Chronic hepatitis C without hepatic coma (HCC)    Marijuana abuse    Rectal bleed    Thrombocytopenia (HCC)    Abdominal ascites    Pedal edema    Lactic acidosis    Alcoholic cirrhosis of liver with ascites (HCC)    Jaundice    Abnormal INR    Hyponatremia  Resolved Problems:    * No resolved hospital problems. *       GI Assessment:  1. Decompensated alcoholic/hep C cirrhosis with ascites  MELD - 33  2. Abnormal LFTs - alcoholic hepatitis , ? Hemochromatosis  MDF - 112 - poor prognosis  3. Ascites - secondary to portal hypertension  Diagnostic tap in ED- No SBP . Cytology pending  4. Rectal bleeding - DDX hemorrhoids, rectal varices, CRC,  5.  Elevated INR -secondary to synthetic dysfunction/liver disease  6.   Thrombocytopenia -secondary to liver disease  7. Hyponatremia-secondary to beer portal shantell and alcoholism  8. Alcohol use disorder  9. Liver lesion less than 5 mm -seen on CT but not seen on ultrasound. AFP 22.2. Plan of care:  1. We will plan for EGD and colonoscopy tomorrow. Patient will need INR ~ 2.   2. Vitamin K 10 mg x 1 today  3. Paracentesis ordered. Timing at the discretion of IR  4. CL diet today then n.p.o. at midnight  5. One-half of bowel prep this evening. 6.  Serology for hemochromatosis      Please feel free to contact me with any questions or concerns. Thank you for allowing me to participate in the care of your patient. PEEWEE Mnceal - CNP on 5/18/2021 at 12:19 PM   THE [facility] [place] [facility]    Please note that this note was generated using a voice recognition dictation software. Although every effort was made to ensure the accuracy of this automated transcription, some errors in transcription may have occurred.

## 2021-05-18 NOTE — PLAN OF CARE
Problem: Falls - Risk of:  Goal: Will remain free from falls  Description: Will remain free from falls  5/18/2021 0215 by Davis Mejias RN  Outcome: Ongoing  5/17/2021 1935 by Rosy Campo RN  Outcome: Ongoing  Goal: Absence of physical injury  Description: Absence of physical injury  5/18/2021 0215 by Davis Mejias RN  Outcome: Ongoing  5/17/2021 1935 by Rosy Campo RN  Outcome: Ongoing

## 2021-05-18 NOTE — PROGRESS NOTES
[facility]  Internal Medicine Teaching Residency Program  Inpatient Daily Progress Note  ______________________________________________________________________________    Patient: Ebony Schaffer  YOB: 1955   FBT:[id number]    Acct: [de-identified]     Room: 1/80-12  Admit date: 5/16/2021  Today's date: 05/18/21  Number of days in the hospital: 2    SUBJECTIVE   Admitting Diagnosis: Alcoholic liver failure ([address]  CC: abdominal pain  Pt examined at bedside. Chart & results reviewed. No acute events overnight. patient didn't receive Vitamin K last night. INR this am 3.3. report of 4 bowel moments last night, patient didn't finish whole bowel prep. Could finish half of it, reports of nausea,     Vitally stable   IV Vitamin K 10mg running now, check INR later today. IR guided paracentesis after reversal of INR. Potassium low 3.3. replace per protocol. ROS:  Constitutional:  Positive  for chills, negative for fevers, sweats  Respiratory:  positive for cough, dyspnea on exertion, denies hemoptysis, shortness of breath, wheezing  Cardiovascular:  negative for chest pain, chest pressure/discomfort, lower extremity edema, palpitations  Gastrointestinal: positive for abdominal pain, , diarrhea, nausea,  Neurological:  negative for dizziness, headache  BRIEF HISTORY     43-year-old male with history of smoking, alcohol abuse, COPD presented to ER with progressive worsening of abdominal swelling associated with fatigue, abdominal pain and yellowing of skin. Patient also report of rectal bleed both fresh and old clots. report noticing blood after wiping the last 5 days associated with painful defecation. Patient denies any history of fever chills, nausea, vomiting. His last drink was 5 days ago. No previous history of upper GI endoscopy or colonoscopy. Vitally he is tachycardic 101, blood pressure 108/83.  On exam, B/L edema noted with mild abdominal distention. Work-up in the ER show sodium 131, normal renal and cardiac profile, albumin 2.7, alk phos 211, , , bilirubin 18.28.  H&H 15.3 and 44.5 with MCV 97.2, platelet 96, INR 3.3 with PT 32.4. CT chest abd pelvis w contrast show emphysema and subsegmental atelectasis or scarring. Finding of cirrhosis. Less than 5 mm hypoattenuating liver lesion in left hepatic lobe. Moderate  abdominopelvic ascites. hypertensive colopathy versus inflammatory colitis. Diagnostic paracentesis was done in the ER so RBC 5000, neutrophil count 30, WBC 81. SAAG ratio less than 1.7. OBJECTIVE     Vital Signs:  /78   Pulse 96   Temp 98.3 °F (36.8 °C) (Oral)   Resp 16   Ht 6' 1\" (1.854 m)   Wt 180 lb (81.6 kg)   SpO2 100%   BMI 23.75 kg/m²     Temp (24hrs), Av.7 °F (36.5 °C), Min:97.1 °F (36.2 °C), Max:98.3 °F (36.8 °C)    No intake/output data recorded. Physical Exam:  Constitutional: This is a well developed, well nourished, 18.5-24.9 - Normal 77y.o. year old male who is alert, oriented, cooperative and in no apparent distress. Head:normocephalic and atraumatic. EENT:  PERRLA. No conjunctival injections. Septum was midline, mucosa was without erythema, exudates or cobblestoning. Neck: Supple without thyromegaly. No elevated JVP. Trachea was midline. Respiratory: Chest was symmetrical without dullness to percussion. Breath sounds bilaterally were clear to auscultation. There were no wheezes, rhonchi or rales. There is no intercostal retraction or use of accessory muscles. No egophony noted. Cardiovascular: Regular without murmur, clicks, gallops or rubs. Abdomen: tender abdomen. Lymphatic: No lymphadenopathy. Musculoskeletal: Normal curvature of the spine. No gross muscle weakness. Extremities:  B/l lower extremity edema, No ulcerations, tenderness, varicosities or erythema.   Muscle size, tone and strength are normal.    Skin:  Bruises noted  Neurological/Psychiatric: The patient's general behavior, level of consciousness, thought content and emotional status is normal.        Medications:  Scheduled Medications:    phytonadione (VITAMIN K)  IVPB  10 mg Intravenous Once    budesonide-formoterol  2 puff Inhalation BID    phytonadione (VITAMIN K)  IVPB  10 mg Intravenous Once    ketorolac  15 mg Intravenous Once    sodium chloride flush  5-40 mL Intravenous 2 times per day    cefTRIAXone (ROCEPHIN) IV  1,000 mg Intravenous Q24H    nicotine  1 patch Transdermal Daily    pantoprazole  40 mg Oral QAM AC    furosemide  20 mg Oral Daily    spironolactone  25 mg Oral Daily    lactulose  20 g Oral TID     Continuous Infusions:    sodium chloride       PRN Medicationsipratropium-albuterol, 1 ampule, Q4H PRN  sodium chloride flush, 5-40 mL, PRN  sodium chloride, 25 mL, PRN  promethazine, 12.5 mg, Q6H PRN   Or  ondansetron, 4 mg, Q6H PRN        Diagnostic Labs:  CBC:   Recent Labs     05/16/21  1147 05/17/21  0314 05/18/21  0505   WBC 9.0 6.5 8.0   RBC 4.58 3.97* 4.39   HGB 15.3 13.4 14.8   HCT 44.5 37.9* 41.9   MCV 97.2 95.5 95.4   RDW 16.4* 16.1* 16.4*   PLT See Reflexed IPF Result See Reflexed IPF Result See Reflexed IPF Result     BMP:   Recent Labs     05/16/21  1147 05/17/21  0314 05/18/21  0505   * 129* 132*   K 4.3 3.9 3.6*   CL 97* 100 99   CO2 22 20 20   BUN 14 16 17   CREATININE 0.30* 0.60* 0.22*     BNP: No results for input(s): BNP in the last 72 hours. PT/INR:   Recent Labs     05/16/21  1147 05/17/21  0314 05/18/21  0505   PROTIME 32.4* 32.5* 31.6*   INR 3.3 3.4 3.3     APTT:   Recent Labs     05/16/21  1147   APTT 40.3*     CARDIAC ENZYMES: No results for input(s): CKMB, CKMBINDEX, TROPONINI in the last 72 hours.     Invalid input(s): CKTOTAL;3  FASTING LIPID PANEL:No results found for: CHOL, HDL, TRIG  LIVER PROFILE:   Recent Labs     05/16/21  1147 05/17/21  0314 05/18/21  0505   * 174* 215*   * 156* 183* MD  Internal Medicine Resident, PGY-1  [facility]; [facility]  5/18/2021, 8:33 AM Attending Physician Statement  I have discussed the care of Mary Cosme, including pertinent history and exam findings,  with the resident. I have seen and examined the patient and the key elements of all parts of the encounter have been performed by me. I agree with the assessment, plan and orders as documented by the resident with additions . Treatment plan Discussed with nursing staff in detail , all questions answered . Electronically signed by Geovanna Gallardo MD on   5/18/21 at 11:20 AM EDT    Please note that this chart was generated using voice recognition Dragon dictation software. Although every effort was made to ensure the accuracy of this automated transcription, some errors in transcription may have occurred.

## 2021-05-18 NOTE — PROGRESS NOTES
Physical Therapy    DATE: 2021    NAME: Melissa Juares  MRN: [id number]   : 1955      Patient not seen this date for Physical Therapy due to:    Patient Declined: stating he is in pain.       Electronically signed by Sayra Jones PTA on 2021 at 3:23 PM

## 2021-05-19 ENCOUNTER — APPOINTMENT (OUTPATIENT)
Dept: ULTRASOUND IMAGING | Age: 66
DRG: 280 | End: 2021-05-19
Payer: MEDICARE

## 2021-05-19 VITALS
OXYGEN SATURATION: 98 % | RESPIRATION RATE: 17 BRPM | SYSTOLIC BLOOD PRESSURE: 98 MMHG | DIASTOLIC BLOOD PRESSURE: 67 MMHG

## 2021-05-19 LAB
ABSOLUTE EOS #: 0.06 K/UL (ref 0–0.44)
ABSOLUTE IMMATURE GRANULOCYTE: 0.13 K/UL (ref 0–0.3)
ABSOLUTE LYMPH #: 1.57 K/UL (ref 1.1–3.7)
ABSOLUTE MONO #: 1.31 K/UL (ref 0.1–1.2)
ALBUMIN SERPL-MCNC: 2.4 G/DL (ref 3.5–5.2)
ALBUMIN/GLOBULIN RATIO: 0.7 (ref 1–2.5)
ALP BLD-CCNC: 165 U/L (ref 40–129)
ALT SERPL-CCNC: 173 U/L (ref 5–41)
ANION GAP SERPL CALCULATED.3IONS-SCNC: 13 MMOL/L (ref 9–17)
AST SERPL-CCNC: 203 U/L
BASOPHILS # BLD: 1 % (ref 0–2)
BASOPHILS ABSOLUTE: 0.05 K/UL (ref 0–0.2)
BILIRUB SERPL-MCNC: 18.92 MG/DL (ref 0.3–1.2)
BUN BLDV-MCNC: 20 MG/DL (ref 8–23)
BUN/CREAT BLD: ABNORMAL (ref 9–20)
CALCIUM SERPL-MCNC: 8.5 MG/DL (ref 8.6–10.4)
CHLORIDE BLD-SCNC: 97 MMOL/L (ref 98–107)
CO2: 21 MMOL/L (ref 20–31)
CREAT SERPL-MCNC: 0.44 MG/DL (ref 0.7–1.2)
DIFFERENTIAL TYPE: ABNORMAL
EOSINOPHILS RELATIVE PERCENT: 1 % (ref 1–4)
GFR AFRICAN AMERICAN: >60 ML/MIN
GFR NON-AFRICAN AMERICAN: >60 ML/MIN
GFR SERPL CREATININE-BSD FRML MDRD: ABNORMAL ML/MIN/{1.73_M2}
GFR SERPL CREATININE-BSD FRML MDRD: ABNORMAL ML/MIN/{1.73_M2}
GLUCOSE BLD-MCNC: 87 MG/DL (ref 70–99)
HCT VFR BLD CALC: 40.3 % (ref 40.7–50.3)
HEMOGLOBIN: 14.4 G/DL (ref 13–17)
IMMATURE GRANULOCYTES: 1 %
INR BLD: 2.5
INR BLD: 3.1
LACTIC ACID, WHOLE BLOOD: 2.7 MMOL/L (ref 0.7–2.1)
LYMPHOCYTES # BLD: 16 % (ref 24–43)
MAGNESIUM: 2 MG/DL (ref 1.6–2.6)
MCH RBC QN AUTO: 33.4 PG (ref 25.2–33.5)
MCHC RBC AUTO-ENTMCNC: 35.7 G/DL (ref 28.4–34.8)
MCV RBC AUTO: 93.5 FL (ref 82.6–102.9)
MONOCYTES # BLD: 14 % (ref 3–12)
NRBC AUTOMATED: 0 PER 100 WBC
PDW BLD-RTO: 16.1 % (ref 11.8–14.4)
PLATELET # BLD: ABNORMAL K/UL (ref 138–453)
PLATELET ESTIMATE: ABNORMAL
PLATELET, FLUORESCENCE: 74 K/UL (ref 138–453)
PLATELET, IMMATURE FRACTION: 2.8 % (ref 1.1–10.3)
PMV BLD AUTO: ABNORMAL FL (ref 8.1–13.5)
POTASSIUM SERPL-SCNC: 3.2 MMOL/L (ref 3.7–5.3)
PROTHROMBIN TIME: 25 SEC (ref 9.1–12.3)
PROTHROMBIN TIME: 30.3 SEC (ref 9.1–12.3)
RBC # BLD: 4.31 M/UL (ref 4.21–5.77)
RBC # BLD: ABNORMAL 10*6/UL
SEG NEUTROPHILS: 68 % (ref 36–65)
SEGMENTED NEUTROPHILS ABSOLUTE COUNT: 6.56 K/UL (ref 1.5–8.1)
SODIUM BLD-SCNC: 131 MMOL/L (ref 135–144)
TOTAL PROTEIN: 6 G/DL (ref 6.4–8.3)
WBC # BLD: 9.7 K/UL (ref 3.5–11.3)
WBC # BLD: ABNORMAL 10*3/UL

## 2021-05-19 PROCEDURE — 3609017100 HC EGD: Performed by: INTERNAL MEDICINE

## 2021-05-19 PROCEDURE — 83735 ASSAY OF MAGNESIUM: CPT

## 2021-05-19 PROCEDURE — 36415 COLL VENOUS BLD VENIPUNCTURE: CPT

## 2021-05-19 PROCEDURE — 6370000000 HC RX 637 (ALT 250 FOR IP): Performed by: STUDENT IN AN ORGANIZED HEALTH CARE EDUCATION/TRAINING PROGRAM

## 2021-05-19 PROCEDURE — 6360000002 HC RX W HCPCS: Performed by: NURSE ANESTHETIST, CERTIFIED REGISTERED

## 2021-05-19 PROCEDURE — 76937 US GUIDE VASCULAR ACCESS: CPT

## 2021-05-19 PROCEDURE — 3609010600 HC COLONOSCOPY POLYPECTOMY SNARE/COLD BIOPSY: Performed by: INTERNAL MEDICINE

## 2021-05-19 PROCEDURE — 2709999900 HC NON-CHARGEABLE SUPPLY: Performed by: INTERNAL MEDICINE

## 2021-05-19 PROCEDURE — 6360000002 HC RX W HCPCS: Performed by: INTERNAL MEDICINE

## 2021-05-19 PROCEDURE — 3700000000 HC ANESTHESIA ATTENDED CARE: Performed by: INTERNAL MEDICINE

## 2021-05-19 PROCEDURE — 43235 EGD DIAGNOSTIC BRUSH WASH: CPT | Performed by: INTERNAL MEDICINE

## 2021-05-19 PROCEDURE — 86927 PLASMA FRESH FROZEN: CPT

## 2021-05-19 PROCEDURE — 0DBN8ZX EXCISION OF SIGMOID COLON, VIA NATURAL OR ARTIFICIAL OPENING ENDOSCOPIC, DIAGNOSTIC: ICD-10-PCS | Performed by: INTERNAL MEDICINE

## 2021-05-19 PROCEDURE — 7100000011 HC PHASE II RECOVERY - ADDTL 15 MIN: Performed by: INTERNAL MEDICINE

## 2021-05-19 PROCEDURE — 36430 TRANSFUSION BLD/BLD COMPNT: CPT

## 2021-05-19 PROCEDURE — 6360000002 HC RX W HCPCS: Performed by: STUDENT IN AN ORGANIZED HEALTH CARE EDUCATION/TRAINING PROGRAM

## 2021-05-19 PROCEDURE — 2580000003 HC RX 258: Performed by: NURSE ANESTHETIST, CERTIFIED REGISTERED

## 2021-05-19 PROCEDURE — 7100000010 HC PHASE II RECOVERY - FIRST 15 MIN: Performed by: INTERNAL MEDICINE

## 2021-05-19 PROCEDURE — 85055 RETICULATED PLATELET ASSAY: CPT

## 2021-05-19 PROCEDURE — 85025 COMPLETE CBC W/AUTO DIFF WBC: CPT

## 2021-05-19 PROCEDURE — 1200000000 HC SEMI PRIVATE

## 2021-05-19 PROCEDURE — 6370000000 HC RX 637 (ALT 250 FOR IP): Performed by: INTERNAL MEDICINE

## 2021-05-19 PROCEDURE — 80053 COMPREHEN METABOLIC PANEL: CPT

## 2021-05-19 PROCEDURE — 3700000001 HC ADD 15 MINUTES (ANESTHESIA): Performed by: INTERNAL MEDICINE

## 2021-05-19 PROCEDURE — 45385 COLONOSCOPY W/LESION REMOVAL: CPT | Performed by: INTERNAL MEDICINE

## 2021-05-19 PROCEDURE — 85610 PROTHROMBIN TIME: CPT

## 2021-05-19 PROCEDURE — 94640 AIRWAY INHALATION TREATMENT: CPT

## 2021-05-19 PROCEDURE — 83605 ASSAY OF LACTIC ACID: CPT

## 2021-05-19 PROCEDURE — 2709999900 US GUIDED PARACENTESIS

## 2021-05-19 PROCEDURE — 2580000003 HC RX 258: Performed by: INTERNAL MEDICINE

## 2021-05-19 PROCEDURE — 2500000003 HC RX 250 WO HCPCS: Performed by: NURSE ANESTHETIST, CERTIFIED REGISTERED

## 2021-05-19 PROCEDURE — 0W9G3ZZ DRAINAGE OF PERITONEAL CAVITY, PERCUTANEOUS APPROACH: ICD-10-PCS | Performed by: RADIOLOGY

## 2021-05-19 PROCEDURE — 2720000010 HC SURG SUPPLY STERILE: Performed by: INTERNAL MEDICINE

## 2021-05-19 PROCEDURE — 88305 TISSUE EXAM BY PATHOLOGIST: CPT

## 2021-05-19 PROCEDURE — 0DJ08ZZ INSPECTION OF UPPER INTESTINAL TRACT, VIA NATURAL OR ARTIFICIAL OPENING ENDOSCOPIC: ICD-10-PCS | Performed by: INTERNAL MEDICINE

## 2021-05-19 PROCEDURE — 99232 SBSQ HOSP IP/OBS MODERATE 35: CPT | Performed by: INTERNAL MEDICINE

## 2021-05-19 PROCEDURE — P9017 PLASMA 1 DONOR FRZ W/IN 8 HR: HCPCS

## 2021-05-19 RX ORDER — SODIUM CHLORIDE 9 MG/ML
INJECTION, SOLUTION INTRAVENOUS PRN
Status: DISCONTINUED | OUTPATIENT
Start: 2021-05-19 | End: 2021-05-19

## 2021-05-19 RX ORDER — SODIUM CHLORIDE 9 MG/ML
INJECTION, SOLUTION INTRAVENOUS CONTINUOUS PRN
Status: DISCONTINUED | OUTPATIENT
Start: 2021-05-19 | End: 2021-05-19 | Stop reason: SDUPTHER

## 2021-05-19 RX ORDER — POTASSIUM CHLORIDE 7.45 MG/ML
10 INJECTION INTRAVENOUS PRN
Status: DISCONTINUED | OUTPATIENT
Start: 2021-05-19 | End: 2021-05-20 | Stop reason: HOSPADM

## 2021-05-19 RX ORDER — GLYCOPYRROLATE 1 MG/5 ML
SYRINGE (ML) INTRAVENOUS PRN
Status: DISCONTINUED | OUTPATIENT
Start: 2021-05-19 | End: 2021-05-19 | Stop reason: SDUPTHER

## 2021-05-19 RX ORDER — PROPOFOL 10 MG/ML
INJECTION, EMULSION INTRAVENOUS CONTINUOUS PRN
Status: DISCONTINUED | OUTPATIENT
Start: 2021-05-19 | End: 2021-05-19 | Stop reason: SDUPTHER

## 2021-05-19 RX ORDER — PROPOFOL 10 MG/ML
INJECTION, EMULSION INTRAVENOUS PRN
Status: DISCONTINUED | OUTPATIENT
Start: 2021-05-19 | End: 2021-05-19 | Stop reason: SDUPTHER

## 2021-05-19 RX ORDER — PHENYLEPHRINE HCL IN 0.9% NACL 1 MG/10 ML
SYRINGE (ML) INTRAVENOUS PRN
Status: DISCONTINUED | OUTPATIENT
Start: 2021-05-19 | End: 2021-05-19 | Stop reason: SDUPTHER

## 2021-05-19 RX ORDER — FENTANYL CITRATE 50 UG/ML
INJECTION, SOLUTION INTRAMUSCULAR; INTRAVENOUS PRN
Status: DISCONTINUED | OUTPATIENT
Start: 2021-05-19 | End: 2021-05-19 | Stop reason: SDUPTHER

## 2021-05-19 RX ORDER — POTASSIUM CHLORIDE 7.45 MG/ML
10 INJECTION INTRAVENOUS
Status: COMPLETED | OUTPATIENT
Start: 2021-05-19 | End: 2021-05-19

## 2021-05-19 RX ADMIN — BUDESONIDE AND FORMOTEROL FUMARATE DIHYDRATE 2 PUFF: 80; 4.5 AEROSOL RESPIRATORY (INHALATION) at 08:54

## 2021-05-19 RX ADMIN — PROPOFOL 20 MG: 10 INJECTION, EMULSION INTRAVENOUS at 16:32

## 2021-05-19 RX ADMIN — SODIUM CHLORIDE: 9 INJECTION, SOLUTION INTRAVENOUS at 16:20

## 2021-05-19 RX ADMIN — Medication 0.2 MG: at 16:23

## 2021-05-19 RX ADMIN — Medication 100 MCG: at 16:32

## 2021-05-19 RX ADMIN — PROPOFOL 20 MG: 10 INJECTION, EMULSION INTRAVENOUS at 16:37

## 2021-05-19 RX ADMIN — POTASSIUM CHLORIDE 10 MEQ: 10 INJECTION, SOLUTION INTRAVENOUS at 12:30

## 2021-05-19 RX ADMIN — PROPOFOL 100 MCG/KG/MIN: 10 INJECTION, EMULSION INTRAVENOUS at 16:23

## 2021-05-19 RX ADMIN — POTASSIUM CHLORIDE 10 MEQ: 10 INJECTION, SOLUTION INTRAVENOUS at 13:36

## 2021-05-19 RX ADMIN — PROPOFOL 10 MG: 10 INJECTION, EMULSION INTRAVENOUS at 16:26

## 2021-05-19 RX ADMIN — POTASSIUM CHLORIDE 10 MEQ: 10 INJECTION, SOLUTION INTRAVENOUS at 10:26

## 2021-05-19 RX ADMIN — LACTULOSE 20 G: 20 SOLUTION ORAL at 21:18

## 2021-05-19 RX ADMIN — FENTANYL CITRATE 25 MCG: 50 INJECTION, SOLUTION INTRAMUSCULAR; INTRAVENOUS at 07:20

## 2021-05-19 RX ADMIN — FENTANYL CITRATE 50 MCG: 50 INJECTION, SOLUTION INTRAMUSCULAR; INTRAVENOUS at 16:23

## 2021-05-19 RX ADMIN — PROPOFOL 30 MG: 10 INJECTION, EMULSION INTRAVENOUS at 16:23

## 2021-05-19 RX ADMIN — POTASSIUM CHLORIDE 10 MEQ: 10 INJECTION, SOLUTION INTRAVENOUS at 11:31

## 2021-05-19 RX ADMIN — Medication 100 MCG: at 16:38

## 2021-05-19 RX ADMIN — Medication 100 MCG: at 16:30

## 2021-05-19 RX ADMIN — Medication 100 MCG: at 16:34

## 2021-05-19 RX ADMIN — CEFTRIAXONE SODIUM 1000 MG: 1 INJECTION, POWDER, FOR SOLUTION INTRAMUSCULAR; INTRAVENOUS at 21:18

## 2021-05-19 RX ADMIN — SODIUM CHLORIDE, PRESERVATIVE FREE 10 ML: 5 INJECTION INTRAVENOUS at 21:18

## 2021-05-19 RX ADMIN — PROPOFOL 10 MG: 10 INJECTION, EMULSION INTRAVENOUS at 16:39

## 2021-05-19 ASSESSMENT — LIFESTYLE VARIABLES: SMOKING_STATUS: 1

## 2021-05-19 ASSESSMENT — PAIN SCALES - GENERAL
PAINLEVEL_OUTOF10: 0
PAINLEVEL_OUTOF10: 0

## 2021-05-19 ASSESSMENT — PAIN - FUNCTIONAL ASSESSMENT: PAIN_FUNCTIONAL_ASSESSMENT: 0-10

## 2021-05-19 NOTE — PROGRESS NOTES
Physical Therapy    DATE: 2021    NAME: Julisa Vogel  MRN: [id number]   : 1955      Patient not seen this date for Physical Therapy due to: Other: pt fatigued from IR, plan for colonoscopy/ EGD this PM. PT will check back 21.        Electronically signed by Gila Oro PT on 2021 at 1:43 PM

## 2021-05-19 NOTE — PROGRESS NOTES
[address] [place] [address]     Department of Internal Medicine - Staff Internal Medicine Service     DAILY PROGRESS NOTE       Patient:  Cory Mack  YOB: 1955  MRN: [id number]     Acct: [de-identified]     Admit date: 5/16/2021  Admitting Diagnosis: Alcoholic liver failure ([address]    Subjective:   Patient seen and examined at bedside  No acute events overnight  VSS    INR @ 16:00 yesterday-3.1 s/p vitK IV 10 mg x1. Repeat pending this morning. .. Other labs also pending. .. Plan is Tx-para w/ IR +/- EGD w/ GI. GI following. ..        Objective:   /86   Pulse 93   Temp 97.8 °F (36.6 °C) (Oral)   Resp 16   Ht 6' 1\" (1.854 m)   Wt 180 lb (81.6 kg)   SpO2 98%   BMI 23.75 kg/m²       General appearance:  alert, cooperative and no distress  Mental Status:  oriented to person, place and time and normal affect  Lungs:  clear to auscultation bilaterally, normal effort  Heart:  regular rate and rhythm, no murmur  Abdomen:  soft, nontender, distended with tense ascites, normal bowel sounds, no masses  Extremities:  ++ bilat LE edema, no redness, No clubbing  Skin:  warm, dry, no gross lesions or rashes       Intake/Output Summary (Last 24 hours) at 5/19/2021 0723  Last data filed at 5/18/2021 1515  Gross per 24 hour   Intake 120 ml   Output --   Net 120 ml         Medications:      budesonide-formoterol  2 puff Inhalation BID    ketorolac  15 mg Intravenous Once    sodium chloride flush  5-40 mL Intravenous 2 times per day    cefTRIAXone (ROCEPHIN) IV  1,000 mg Intravenous Q24H    nicotine  1 patch Transdermal Daily    pantoprazole  40 mg Oral QAM AC    furosemide  20 mg Oral Daily    spironolactone  25 mg Oral Daily    lactulose  20 g Oral TID       Diagnostic Labs and Imaging    CBC:   Recent Labs     05/17/21  0314 05/18/21  0505 05/19/21  0658   WBC 6.5 8.0 9.7   RBC 3.97* 4.39 4.31   HGB 13.4 14.8 14.4   HCT 37.9* 41.9 40.3*   MCV 95.5 95.4 93.5   RDW 16.1* 16.4* 16.1*   PLT planning    Nataliia Nolan MD  PGY-2, Department of Internal Medicine  [place], [place]  /  Attending Physician Statement  I have discussed the care of Qian Solis, including pertinent history and exam findings,  with the resident. I have seen and examined the patient and the key elements of all parts of the encounter have been performed by me. I agree with the assessment, plan and orders as documented by the resident with additions . Treatment plan Discussed with nursing staff in detail , all questions answered . Electronically signed by Jaiden Prado MD on   5/19/21 at 2:36 PM EDT    Please note that this chart was generated using voice recognition Dragon dictation software. Although every effort was made to ensure the accuracy of this automated transcription, some errors in transcription may have occurred.

## 2021-05-19 NOTE — OP NOTE
[id number] [facility] [place]  EGD & COLONOSCOPY      Patient:   Wolfgang Ruiz    :    1955    Referring/PCP: PEEWEE Garcia - CNP  Procedure:   Colonoscopy with polypectomy (cold snare); Esophagogastroduodenoscopy  --diagnostic  Facility:  [address] [place] [address]  Date:     2021  Endoscopist:  Radha Meza MD, [facility]    Indication:  rectal bleeding. Cirrhosis of the liver for variceal screening    Anesthesia:  MAC    Complications: None    EBL: None from the procedure    Specimen: Sent to the lab    Description of Procedure:  Informed consent was obtained from the patient after explanation of the procedure including indications, description of the procedure,  benefits and possible risks and complications of the procedure, and alternatives. Questions were answered. The patient's history was reviewed and a directed physical examination was performed prior to the procedure. Patient was monitored throughout the procedure with pulse oximetry and periodic assessment of vital signs. Patient was sedated as noted above. With the patient initially in the left lateral decubitus position, a digital rectal examination was performed and revealed negative without mass or lesions. The Olympus video colonoscope was placed in the patient's rectum and advanced without difficulty  to the terminal ileum. The prep was good. Examination of the mucosa was performed during both introduction and withdrawal of the colonoscope. Retroflexed view of the rectum was performed. Findings:   1. Normal terminal ileum  2.  5 mm sessile polyp in the sigmoid colon resected with cold snare. Resection and retrieval was complete. This area was clipped with 2 metal resolution clips. 3.  Small rectal varices. 4.  Grade 2 internal hemorrhoids on retroflexion.   5.  Colon exam was otherwise normal    With the patient in the left lateral decubitus position, the Olympus videoendoscope was placed in the patient's mouth and under direct visualization passed into the esophagus. Visualization of the esophagus, stomach, and duodenum was performed during both introduction and withdrawal of the endoscope and retroflexed view of the proximal stomach was obtained. The scope was passed to the second portion of the duodenum. The patient tolerated the procedure well and was taken to the recovery area in good condition. Findings[de-identified]   Esophagus: Small esophageal varices. Esophagus otherwise appeared normal.  Stomach: Moderate amount of portal hypertensive gastropathy in the fundus and body of the stomach. The stomach was otherwise normal.  Duodenum: The duodenum appeared to have multiple duodenal varices in the second portion of the duodenum without any active bleeding. The duodenum otherwise appeared normal       Recommendations:  Repeat colonoscopy in 5 years. -Await pathology. Regular diet. Nonselective beta-blockers.     Electronically signed by Alex Christianson MD, FACG on 5/19/2021 at 4:51 PM

## 2021-05-19 NOTE — PLAN OF CARE
Problem: Pain:  Goal: Pain level will decrease  Description: Pain level will decrease  5/18/2021 2136 by Octavio Hartman RN  Outcome: Ongoing  5/18/2021 2136 by Octavio Hartman RN  Outcome: Ongoing  Goal: Control of acute pain  Description: Control of acute pain  5/18/2021 2136 by Octavio Hartman RN  Outcome: Ongoing  5/18/2021 2136 by Octavio Hartman RN  Outcome: Ongoing  Goal: Control of chronic pain  Description: Control of chronic pain  5/18/2021 2136 by Octavio Hartman RN  Outcome: Ongoing  5/18/2021 2136 by Octavio Hartman RN  Outcome: Ongoing

## 2021-05-19 NOTE — PROGRESS NOTES
Pt arrives to room for therap para  RS RDMS and  PA to room  Site prepped and draped  Access kit exp 01/22  access obtained and draining clear yellow fluid    4250ml removed   Access removed and site covered with 2x2 and tegaderm  Return to floor  tolerated well

## 2021-05-19 NOTE — BRIEF OP NOTE
Brief Postoperative Note for Paracentesis    Fay Alford  YOB: 1955  [id number]    Pre-operative Diagnosis:  Alcoholic cirrhosis w/ ascites      Post-operative Diagnosis: Same    Procedure: Ultrasound guided Paracentesis     Anesthesia: 1% Lidocaine     Surgeons/Assistants: MINO Desir     Complications: none    EBL: Minimal    Specimens: were obtained    Ultrasound guided paracentesis performed. 4250 ml clear yellow fluid obtained. Dressing applied.      Electronically signed by MINO Fleming on 5/19/2021 at 1:29 PM

## 2021-05-19 NOTE — ANESTHESIA PRE PROCEDURE
Department of Anesthesiology  Preprocedure Note       Name:  Connie Moreno   Age:  77 y.o.  :  1955                                          MRN:  [id number]         Date:  2021      Surgeon: Siobhan Blackwell):  Sabra Reich MD    Procedure: Procedure(s):  EGD DIAGNOSTIC ONLY  COLONOSCOPY DIAGNOSTIC    Medications prior to admission:   Prior to Admission medications    Not on File       Current medications:    Current Facility-Administered Medications   Medication Dose Route Frequency Provider Last Rate Last Admin    potassium chloride 10 mEq/100 mL IVPB (Peripheral Line)  10 mEq Intravenous PRN Danilo Castro MD        0.9 % sodium chloride infusion   Intravenous PRN PEEWEE Gee - CNP        fentaNYL (SUBLIMAZE) injection 25 mcg  25 mcg Intravenous Q4H PRN Danilo Castro MD   25 mcg at 21 0720    naloxone [facility]) injection 0.4 mg  0.4 mg Intravenous PRN Danilo Castro MD        budesonide-formoterol (SYMBICORT) 80-4.5 MCG/ACT inhaler 2 puff  2 puff Inhalation BID Vamshi Gonzalez MD   2 puff at 21 0854    ketorolac (TORADOL) injection 15 mg  15 mg Intravenous Once Iza Javed MD        ipratropium-albuterol (DUONEB) nebulizer solution 1 ampule  1 ampule Inhalation Q4H PRN Roxanne Carter MD        sodium chloride flush 0.9 % injection 5-40 mL  5-40 mL Intravenous 2 times per day Roxanne Carter MD        sodium chloride flush 0.9 % injection 5-40 mL  5-40 mL Intravenous PRN Roxanne Carter MD        0.9 % sodium chloride infusion  25 mL Intravenous PRN Roxanne Carter MD        promethazine (PHENERGAN) tablet 12.5 mg  12.5 mg Oral Q6H PRN Roxanne Carter MD        Or    ondansetron (ZOFRAN) injection 4 mg  4 mg Intravenous Q6H PRN Roxanne Carter MD        cefTRIAXone (ROCEPHIN) 1000 mg IVPB in 50 mL D5W minibag  1,000 mg Intravenous Q24H Roxanne Carter MD   Stopped at 21 2203    nicotine (NICODERM CQ) 14 MG/24HR 1 patch  1 patch Transdermal Daily Jennifer Ramos REPAIR      VEIN SURGERY      Leg vein    VITRECTOMY Left 08/27/2013    and globe repair       Social History:    Social History     Tobacco Use    Smoking status: Current Some Day Smoker     Packs/day: 3.00     Years: 50.00     Pack years: 150.00     Types: Cigarettes    Smokeless tobacco: Never Used   Substance Use Topics    Alcohol use: Yes     Alcohol/week: 28.0 standard drinks     Types: 28 Cans of beer per week                                Ready to quit: Not Answered  Counseling given: Not Answered      Vital Signs (Current):   Vitals:    05/19/21 0800 05/19/21 1253 05/19/21 1304 05/19/21 1509   BP: 106/75 122/83 116/83 (!) 103/90   Pulse: 93 87 88 88   Resp: 16 16  16   Temp: 97.5 °F (36.4 °C)   97.5 °F (36.4 °C)   TempSrc: Oral   Tympanic   SpO2: 99% 96% 98% 97%   Weight:    180 lb (81.6 kg)   Height:    6' 1\" (1.854 m)                                              BP Readings from Last 3 Encounters:   05/19/21 (!) 103/90   05/05/19 (!) 128/97   10/15/14 100/73       NPO Status: Time of last liquid consumption: 0000                        Time of last solid consumption: 0000                        Date of last liquid consumption: 05/19/21                        Date of last solid food consumption: 05/17/21    BMI:   Wt Readings from Last 3 Encounters:   05/19/21 180 lb (81.6 kg)   05/05/19 180 lb (81.6 kg)   10/15/14 160 lb (72.6 kg)     Body mass index is 23.75 kg/m².     CBC:   Lab Results   Component Value Date    WBC 9.7 05/19/2021    RBC 4.31 05/19/2021    RBC 5.27 04/19/2012    HGB 14.4 05/19/2021    HCT 40.3 05/19/2021    MCV 93.5 05/19/2021    RDW 16.1 05/19/2021    PLT See Reflexed IPF Result 05/19/2021     04/19/2012       CMP:   Lab Results   Component Value Date     05/19/2021    K 3.2 05/19/2021    CL 97 05/19/2021    CO2 21 05/19/2021    BUN 20 05/19/2021    CREATININE 0.44 05/19/2021    GFRAA >60 05/19/2021    LABGLOM >60 05/19/2021    GLUCOSE 87 05/19/2021    GLUCOSE 76 04/19/2012    PROT 6.0 05/19/2021    CALCIUM 8.5 05/19/2021    BILITOT 18.92 05/19/2021    ALKPHOS 165 05/19/2021     05/19/2021     05/19/2021       POC Tests: No results for input(s): POCGLU, POCNA, POCK, POCCL, POCBUN, POCHEMO, POCHCT in the last 72 hours. Coags:   Lab Results   Component Value Date    PROTIME 25.0 05/19/2021    INR 2.5 05/19/2021    APTT 40.3 05/16/2021       HCG (If Applicable): No results found for: PREGTESTUR, PREGSERUM, HCG, HCGQUANT     ABGs: No results found for: PHART, PO2ART, QPX5HGM, DWM1SSI, BEART, U5DMTXDI     Type & Screen (If Applicable):  No results found for: LABABO, LABRH    Drug/Infectious Status (If Applicable):  Lab Results   Component Value Date    HEPCAB REACTIVE 05/16/2021       COVID-19 Screening (If Applicable):   Lab Results   Component Value Date    COVID19 Not Detected 05/16/2021           Anesthesia Evaluation  Patient summary reviewed no history of anesthetic complications:   Airway: Mallampati: II        Dental:          Pulmonary:   (+) COPD:  decreased breath sounds,  asthma: current smoker                           Cardiovascular:  Exercise tolerance: good (>4 METS),           Rhythm: regular  Rate: normal                    Neuro/Psych:   (+) neuromuscular disease:, psychiatric history:             ROS comment: Cervical spine fracture (HCC) C7, after fall, no surgery   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 Anesthetic plan and risks discussed with patient. Plan discussed with CRNA.           Normal sinus rhythm  Low voltage QRS  Nonspecific T wave abnormality  Prolonged QT  Abnormal ECG  When compared with ECG of 20-SEP-2013 10:35,  Aberrant conduction is no longer Present  ST no longer elevated in Anterior leads  Nonspecific T wave abnormality now evident in Inferior leads  Nonspecific T wave abnormality now evident in Anterior leads        Zenon Andrea MD   5/19/2021

## 2021-05-20 VITALS
HEIGHT: 73 IN | BODY MASS INDEX: 23.86 KG/M2 | OXYGEN SATURATION: 96 % | HEART RATE: 19 BPM | TEMPERATURE: 97.9 F | SYSTOLIC BLOOD PRESSURE: 97 MMHG | DIASTOLIC BLOOD PRESSURE: 65 MMHG | RESPIRATION RATE: 16 BRPM | WEIGHT: 180 LBS

## 2021-05-20 LAB
ABSOLUTE EOS #: 0.04 K/UL (ref 0–0.44)
ABSOLUTE IMMATURE GRANULOCYTE: 0.08 K/UL (ref 0–0.3)
ABSOLUTE LYMPH #: 0.95 K/UL (ref 1.1–3.7)
ABSOLUTE MONO #: 0.96 K/UL (ref 0.1–1.2)
ALBUMIN SERPL-MCNC: 2.2 G/DL (ref 3.5–5.2)
ALBUMIN/GLOBULIN RATIO: 0.7 (ref 1–2.5)
ALP BLD-CCNC: 148 U/L (ref 40–129)
ALT SERPL-CCNC: 149 U/L (ref 5–41)
ANION GAP SERPL CALCULATED.3IONS-SCNC: 12 MMOL/L (ref 9–17)
AST SERPL-CCNC: 187 U/L
BASOPHILS # BLD: 1 % (ref 0–2)
BASOPHILS ABSOLUTE: 0.04 K/UL (ref 0–0.2)
BILIRUB SERPL-MCNC: 15.87 MG/DL (ref 0.3–1.2)
BLD PROD TYP BPU: NORMAL
BUN BLDV-MCNC: 23 MG/DL (ref 8–23)
BUN/CREAT BLD: ABNORMAL (ref 9–20)
CALCIUM SERPL-MCNC: 8 MG/DL (ref 8.6–10.4)
CHLORIDE BLD-SCNC: 103 MMOL/L (ref 98–107)
CO2: 21 MMOL/L (ref 20–31)
CREAT SERPL-MCNC: 0.43 MG/DL (ref 0.7–1.2)
DIFFERENTIAL TYPE: ABNORMAL
DISPENSE STATUS BLOOD BANK: NORMAL
EOSINOPHILS RELATIVE PERCENT: 1 % (ref 1–4)
GFR AFRICAN AMERICAN: >60 ML/MIN
GFR NON-AFRICAN AMERICAN: >60 ML/MIN
GFR SERPL CREATININE-BSD FRML MDRD: ABNORMAL ML/MIN/{1.73_M2}
GFR SERPL CREATININE-BSD FRML MDRD: ABNORMAL ML/MIN/{1.73_M2}
GLUCOSE BLD-MCNC: 83 MG/DL (ref 70–99)
HCT VFR BLD CALC: 35.3 % (ref 40.7–50.3)
HEMOGLOBIN: 12.2 G/DL (ref 13–17)
IMMATURE GRANULOCYTES: 1 %
LYMPHOCYTES # BLD: 13 % (ref 24–43)
MCH RBC QN AUTO: 33.3 PG (ref 25.2–33.5)
MCHC RBC AUTO-ENTMCNC: 34.6 G/DL (ref 28.4–34.8)
MCV RBC AUTO: 96.4 FL (ref 82.6–102.9)
MONOCYTES # BLD: 13 % (ref 3–12)
NRBC AUTOMATED: 0 PER 100 WBC
PDW BLD-RTO: 16.6 % (ref 11.8–14.4)
PLATELET # BLD: ABNORMAL K/UL (ref 138–453)
PLATELET ESTIMATE: ABNORMAL
PLATELET, FLUORESCENCE: 68 K/UL (ref 138–453)
PLATELET, IMMATURE FRACTION: 2.6 % (ref 1.1–10.3)
PMV BLD AUTO: ABNORMAL FL (ref 8.1–13.5)
POTASSIUM SERPL-SCNC: 3.8 MMOL/L (ref 3.7–5.3)
RBC # BLD: 3.66 M/UL (ref 4.21–5.77)
RBC # BLD: ABNORMAL 10*6/UL
SEG NEUTROPHILS: 72 % (ref 36–65)
SEGMENTED NEUTROPHILS ABSOLUTE COUNT: 5.36 K/UL (ref 1.5–8.1)
SODIUM BLD-SCNC: 136 MMOL/L (ref 135–144)
TOTAL PROTEIN: 5.3 G/DL (ref 6.4–8.3)
TRANSFUSION STATUS: NORMAL
UNIT DIVISION: 0
UNIT NUMBER: NORMAL
WBC # BLD: 7.4 K/UL (ref 3.5–11.3)
WBC # BLD: ABNORMAL 10*3/UL

## 2021-05-20 PROCEDURE — 85055 RETICULATED PLATELET ASSAY: CPT

## 2021-05-20 PROCEDURE — 85025 COMPLETE CBC W/AUTO DIFF WBC: CPT

## 2021-05-20 PROCEDURE — 6370000000 HC RX 637 (ALT 250 FOR IP): Performed by: INTERNAL MEDICINE

## 2021-05-20 PROCEDURE — 99232 SBSQ HOSP IP/OBS MODERATE 35: CPT | Performed by: INTERNAL MEDICINE

## 2021-05-20 PROCEDURE — APPSS30 APP SPLIT SHARED TIME 16-30 MINUTES: Performed by: INTERNAL MEDICINE

## 2021-05-20 PROCEDURE — 6370000000 HC RX 637 (ALT 250 FOR IP): Performed by: STUDENT IN AN ORGANIZED HEALTH CARE EDUCATION/TRAINING PROGRAM

## 2021-05-20 PROCEDURE — 2580000003 HC RX 258: Performed by: STUDENT IN AN ORGANIZED HEALTH CARE EDUCATION/TRAINING PROGRAM

## 2021-05-20 PROCEDURE — 6360000002 HC RX W HCPCS: Performed by: INTERNAL MEDICINE

## 2021-05-20 PROCEDURE — 36415 COLL VENOUS BLD VENIPUNCTURE: CPT

## 2021-05-20 PROCEDURE — 80053 COMPREHEN METABOLIC PANEL: CPT

## 2021-05-20 PROCEDURE — 94760 N-INVAS EAR/PLS OXIMETRY 1: CPT

## 2021-05-20 PROCEDURE — 94640 AIRWAY INHALATION TREATMENT: CPT

## 2021-05-20 RX ORDER — ALBUTEROL SULFATE 90 UG/1
2 AEROSOL, METERED RESPIRATORY (INHALATION) EVERY 6 HOURS PRN
Qty: 1 INHALER | Refills: 3 | Status: SHIPPED | OUTPATIENT
Start: 2021-05-20

## 2021-05-20 RX ORDER — 0.9 % SODIUM CHLORIDE 0.9 %
500 INTRAVENOUS SOLUTION INTRAVENOUS ONCE
Status: COMPLETED | OUTPATIENT
Start: 2021-05-20 | End: 2021-05-20

## 2021-05-20 RX ORDER — ALBUTEROL SULFATE 90 UG/1
2 AEROSOL, METERED RESPIRATORY (INHALATION) EVERY 6 HOURS PRN
Qty: 1 INHALER | Refills: 3 | Status: SHIPPED | OUTPATIENT
Start: 2021-05-20 | End: 2021-05-20 | Stop reason: SDUPTHER

## 2021-05-20 RX ORDER — FOLIC ACID 1 MG/1
1 TABLET ORAL DAILY
Status: DISCONTINUED | OUTPATIENT
Start: 2021-05-20 | End: 2021-05-20 | Stop reason: HOSPADM

## 2021-05-20 RX ORDER — MULTIVITAMIN WITH IRON
1 TABLET ORAL DAILY
Qty: 30 TABLET | Refills: 2 | Status: SHIPPED | OUTPATIENT
Start: 2021-05-20

## 2021-05-20 RX ORDER — PANTOPRAZOLE SODIUM 40 MG/1
40 TABLET, DELAYED RELEASE ORAL
Qty: 30 TABLET | Refills: 3 | Status: SHIPPED | OUTPATIENT
Start: 2021-05-21 | End: 2021-05-20

## 2021-05-20 RX ORDER — SPIRONOLACTONE 25 MG/1
25 TABLET ORAL DAILY
Qty: 30 TABLET | Refills: 3 | Status: SHIPPED | OUTPATIENT
Start: 2021-05-20 | End: 2021-05-20

## 2021-05-20 RX ORDER — MULTIVITAMIN WITH IRON
1 TABLET ORAL DAILY
Status: DISCONTINUED | OUTPATIENT
Start: 2021-05-20 | End: 2021-05-20 | Stop reason: HOSPADM

## 2021-05-20 RX ORDER — LACTULOSE 10 G/15ML
20 SOLUTION ORAL 3 TIMES DAILY
Qty: 2 BOTTLE | Refills: 2 | Status: SHIPPED | OUTPATIENT
Start: 2021-05-20

## 2021-05-20 RX ORDER — LACTULOSE 10 G/15ML
20 SOLUTION ORAL 3 TIMES DAILY
Qty: 2 BOTTLE | Refills: 2 | Status: SHIPPED | OUTPATIENT
Start: 2021-05-20 | End: 2021-05-20

## 2021-05-20 RX ORDER — FUROSEMIDE 20 MG/1
20 TABLET ORAL DAILY
Qty: 60 TABLET | Refills: 3 | Status: SHIPPED | OUTPATIENT
Start: 2021-05-20

## 2021-05-20 RX ORDER — GAUZE BANDAGE 2" X 2"
100 BANDAGE TOPICAL DAILY
Status: DISCONTINUED | OUTPATIENT
Start: 2021-05-20 | End: 2021-05-20 | Stop reason: HOSPADM

## 2021-05-20 RX ORDER — 0.9 % SODIUM CHLORIDE 0.9 %
1000 INTRAVENOUS SOLUTION INTRAVENOUS ONCE
Status: DISCONTINUED | OUTPATIENT
Start: 2021-05-20 | End: 2021-05-20

## 2021-05-20 RX ORDER — FUROSEMIDE 20 MG/1
20 TABLET ORAL DAILY
Qty: 60 TABLET | Refills: 3 | Status: SHIPPED | OUTPATIENT
Start: 2021-05-20 | End: 2021-05-20

## 2021-05-20 RX ORDER — MULTIVITAMIN WITH IRON
1 TABLET ORAL DAILY
Qty: 30 TABLET | Refills: 2 | Status: SHIPPED | OUTPATIENT
Start: 2021-05-20 | End: 2021-05-20

## 2021-05-20 RX ORDER — LANOLIN ALCOHOL/MO/W.PET/CERES
100 CREAM (GRAM) TOPICAL DAILY
Status: DISCONTINUED | OUTPATIENT
Start: 2021-05-20 | End: 2021-05-20

## 2021-05-20 RX ORDER — SODIUM CHLORIDE 9 MG/ML
INJECTION, SOLUTION INTRAVENOUS CONTINUOUS
Status: DISCONTINUED | OUTPATIENT
Start: 2021-05-20 | End: 2021-05-20

## 2021-05-20 RX ORDER — NICOTINE 21 MG/24HR
1 PATCH, TRANSDERMAL 24 HOURS TRANSDERMAL EVERY 24 HOURS
Qty: 30 PATCH | Refills: 1 | Status: SHIPPED | OUTPATIENT
Start: 2021-05-20 | End: 2021-07-19

## 2021-05-20 RX ORDER — SPIRONOLACTONE 25 MG/1
25 TABLET ORAL DAILY
Qty: 30 TABLET | Refills: 3 | Status: SHIPPED | OUTPATIENT
Start: 2021-05-20

## 2021-05-20 RX ORDER — OXYCODONE HYDROCHLORIDE 5 MG/1
5 TABLET ORAL EVERY 8 HOURS PRN
Qty: 9 TABLET | Refills: 0 | Status: SHIPPED | OUTPATIENT
Start: 2021-05-20 | End: 2021-05-23

## 2021-05-20 RX ORDER — PANTOPRAZOLE SODIUM 40 MG/1
40 TABLET, DELAYED RELEASE ORAL
Qty: 30 TABLET | Refills: 3 | Status: SHIPPED | OUTPATIENT
Start: 2021-05-21

## 2021-05-20 RX ORDER — THIAMINE MONONITRATE (VIT B1) 100 MG
100 TABLET ORAL DAILY
Qty: 30 TABLET | Refills: 2 | Status: SHIPPED | OUTPATIENT
Start: 2021-05-20

## 2021-05-20 RX ORDER — FOLIC ACID 1 MG/1
1 TABLET ORAL DAILY
Qty: 30 TABLET | Refills: 3 | Status: SHIPPED | OUTPATIENT
Start: 2021-05-20 | End: 2021-05-20

## 2021-05-20 RX ORDER — FOLIC ACID 1 MG/1
1 TABLET ORAL DAILY
Qty: 30 TABLET | Refills: 3 | Status: SHIPPED | OUTPATIENT
Start: 2021-05-20

## 2021-05-20 RX ORDER — THIAMINE MONONITRATE (VIT B1) 100 MG
100 TABLET ORAL DAILY
Qty: 30 TABLET | Refills: 2 | Status: SHIPPED | OUTPATIENT
Start: 2021-05-20 | End: 2021-05-20

## 2021-05-20 RX ADMIN — Medication 100 MG: at 09:57

## 2021-05-20 RX ADMIN — SODIUM CHLORIDE 500 ML: 9 INJECTION, SOLUTION INTRAVENOUS at 05:50

## 2021-05-20 RX ADMIN — FENTANYL CITRATE 25 MCG: 50 INJECTION, SOLUTION INTRAMUSCULAR; INTRAVENOUS at 01:45

## 2021-05-20 RX ADMIN — FENTANYL CITRATE 25 MCG: 50 INJECTION, SOLUTION INTRAMUSCULAR; INTRAVENOUS at 11:00

## 2021-05-20 RX ADMIN — FOLIC ACID 1 MG: 1 TABLET ORAL at 09:57

## 2021-05-20 RX ADMIN — THERA TABS 1 TABLET: TAB at 09:57

## 2021-05-20 RX ADMIN — BUDESONIDE AND FORMOTEROL FUMARATE DIHYDRATE 2 PUFF: 80; 4.5 AEROSOL RESPIRATORY (INHALATION) at 07:33

## 2021-05-20 RX ADMIN — LACTULOSE 20 G: 20 SOLUTION ORAL at 09:00

## 2021-05-20 RX ADMIN — FUROSEMIDE 20 MG: 20 TABLET ORAL at 09:00

## 2021-05-20 RX ADMIN — SPIRONOLACTONE 25 MG: 25 TABLET ORAL at 09:00

## 2021-05-20 RX ADMIN — PANTOPRAZOLE SODIUM 40 MG: 40 TABLET, DELAYED RELEASE ORAL at 05:50

## 2021-05-20 ASSESSMENT — PAIN SCALES - GENERAL
PAINLEVEL_OUTOF10: 10
PAINLEVEL_OUTOF10: 10

## 2021-05-20 NOTE — PROGRESS NOTES
CLINICAL PHARMACY NOTE: MEDS TO BEDS    Total # of Prescriptions Filled: 9   The following medications were delivered to the patient:  · Pantoprazole 40mg  · Vitamin B-1 100mg  · Multi Vitamin  · Furosemide 20mg  · Spironolactone 28LP  · Folic Acid 1mg  · Lactulose 10gm/15ml  · Albuterol Inhaler  · Dulera 100-5     Additional Documentation: delivered to patient in room 316 5/20 at 11am. No co-pay.

## 2021-05-20 NOTE — PROGRESS NOTES
[facility]. [facility] Gastroenterology Progress Note    Fay Alford is a 77 y.o. male patient. Hospitalization Day:4      Chief consult reason: Cirrhosis      Subjective: Patient seen and examined. Patient arouses to verbal stimuli. Systolic blood pressure 92/02 last night. Patient received 500 mL fluid bolus. Paracentesis yesterday with 4.2 L removed. No signs of GI bleeding      Objective:   VITALS:  BP (!) 90/59   Pulse 87   Temp 97.6 °F (36.4 °C) (Axillary)   Resp 16   Ht 6' 1\" (1.854 m)   Wt 180 lb (81.6 kg)   SpO2 97%   BMI 23.75 kg/m²   TEMPERATURE:  Current - Temp: 97.6 °F (36.4 °C);  Max - Temp  Av.5 °F (36.4 °C)  Min: 97.3 °F (36.3 °C)  Max: 97.9 °F (36.6 °C)    Physical Assessment:  General appearance:  alert, cooperative and no distress  Mental Status:  oriented to person, place and time and normal affect  Lungs:  clear to auscultation bilaterally, normal effort  Heart:  regular rate and rhythm, no murmur  Abdomen:  soft,tender, distended,  normal bowel sounds, no masses  Extremities:  ++ bilat LE edema, no redness, No clubbing  Skin:  warm, dry, no gross lesions or rashes    CURRENT MEDICATIONS:  Scheduled Meds:   sodium chloride  500 mL Intravenous Once    budesonide-formoterol  2 puff Inhalation BID    ketorolac  15 mg Intravenous Once    sodium chloride flush  5-40 mL Intravenous 2 times per day    cefTRIAXone (ROCEPHIN) IV  1,000 mg Intravenous Q24H    nicotine  1 patch Transdermal Daily    pantoprazole  40 mg Oral QAM AC    furosemide  20 mg Oral Daily    spironolactone  25 mg Oral Daily    lactulose  20 g Oral TID     Continuous Infusions:    PRN Meds:potassium chloride, fentanNYL, naloxone, ipratropium-albuterol, sodium chloride flush, promethazine **OR** ondansetron      Data Review:  LABS and IMAGING:     CBC  Recent Labs     21  0505 21  0658 21  0553   WBC 8.0 9.7 7.4   HGB 14.8 14.4 12.2*   HCT 41.9 40.3* 35.3*   MCV 95.4 93.5 96.4   MCHC 35.3* hours. Ca 19-9:   No results for input(s):  in the last 72 hours. AFP:   No results for input(s): AFP in the last 72 hours. Lactic acid:  Recent Labs     05/18/21  0840 05/18/21  1624 05/19/21  0826   LACTACIDWB 2.4* 3.6* 2.7*         Radiology Review:    US ABDOMEN LIMITED Specify organ? LIVER    Result Date: 5/17/2021  EXAMINATION: RIGHT UPPER QUADRANT ULTRASOUND 5/17/2021 9:20 am COMPARISON: CT 05/16/2021 HISTORY: ORDERING SYSTEM PROVIDED HISTORY: to rule thromobosis, cirrhosis ? TECHNOLOGIST PROVIDED HISTORY: to rule thromobosis, cirrhosis ? Specify organ?->LIVER FINDINGS: LIVER:  Nodular cirrhotic liver. Liver length 14 cm. No focal liver lesion is demonstrated. A less than 5 mm hypodense liver lesion reported on the previous CT scan is not demonstrated on this exam.  MR imaging may be considered. BILIARY SYSTEM:  There is no intrahepatic biliary ductal dilatation. The common bile duct is not dilated. Measures 2-3 mm. RIGHT KIDNEY: The right kidney is grossly unremarkable without evidence of hydronephrosis. PANCREAS:  Visualized portions of the pancreas are unremarkable. OTHER: Color and spectral Doppler imaging was performed. Normal waveform patterns noted in the main portal as well as left right portal vein branches. Peak systolic velocities are low. Peak systolic velocity in the main portal vein is 7.9 cm/S. The hepatic veins also patent. No sonographic evidence for portal vein thrombosis. There is hepatic pedal flow in the portal vein. Large volume ascites. 1. Nodular cirrhotic liver. 2. Large volume ascites. Mild gallbladder wall thickening attributed to hypoproteinemic state from the cirrhosis and ascites. No cholelithiasis. 3. No sonographic evidence for portal vein thrombosis. There is normal directional flow in the portal vein. 4. The less than 5 mm hypodense lesion reported on prior CT scan is not sonographically apparent.   Consider further imaging with MRI to definitively exclude the lesion. CT CHEST ABDOMEN PELVIS W CONTRAST    Result Date: 5/16/2021  EXAMINATION: CT OF THE CHEST, ABDOMEN, AND PELVIS WITH CONTRAST 5/16/2021 12:34 pm TECHNIQUE: CT of the chest, abdomen and pelvis was performed with the administration of intravenous contrast. Multiplanar reformatted images are provided for review. Dose modulation, iterative reconstruction, and/or weight based adjustment of the mA/kV was utilized to reduce the radiation dose to as low as reasonably achievable. COMPARISON: 05/05/2019 HISTORY: ORDERING SYSTEM PROVIDED HISTORY: new sob, evidence of liver failure, rectal bleeding abdominal pain, bloating TECHNOLOGIST PROVIDED HISTORY: new sob, evidence of liver failure, rectal bleeding abdominal pain, bloating Decision Support Exception - unselect if not a suspected or confirmed emergency medical condition->Emergency Medical Condition (MA) Reason for Exam: bloated, abdominal pain , nausea Acuity: Unknown Type of Exam: Unknown FINDINGS: Chest: Mediastinum: The heart and great vessels are normal in size. Calcified atheromatous plaque and coronary calcifications are noted. No pericardial effusion. No enlarged or suspicious-appearing lymph nodes are identified. Lungs/pleura: Paraseptal and centrilobular emphysematous disease. Subsegmental atelectasis or scarring in the lung bases. No consolidation. No evidence for edema. No effusion. The central airway is patent. Soft Tissues/Bones: No acute findings. Abdomen/Pelvis: Organs: The liver is small with morphologic findings of cirrhosis. Tiny less than 5 mm hypoattenuating liver lesion in the dome of segment 4 is too small to characterize. This is not clearly demonstrated on the prior exam, which may have been due to the absence of contrast.  The spleen is normal in size. The portal venous system is patent. The gallbladder and biliary tree reveal no acute findings. The pancreas, adrenals and kidneys reveal no acute findings.  GI/Bowel: No evidence for bowel obstruction. Edematous appearance of the proximal colon is noted, which may be related to portal hypertensive colopathy. Normal appendix. Pelvis: No acute findings. Peritoneum/Retroperitoneum: Moderate volume abdominopelvic ascites. No loculated fluid collection. No free air. The aorta is normal in caliber. Bones/Soft Tissues: No acute osseous abnormality identified. Advanced disc disease at L5-S1.     1.  Emphysema and subsegmental atelectasis or scarring in the lung bases. 2.  Morphologic findings of cirrhosis are demonstrated. Less than 5 mm hypoattenuating liver lesion in the central left hepatic lobe is too small to characterize. Attention to on follow-up is recommended. 3.  Moderate volume abdominopelvic ascites. 4.  Edematous appearance of the proximal colon, favored to represent portal hypertensive colopathy versus inflammatory colitis. ENDOSCOPY     Procedure:                 Colonoscopy with polypectomy (cold snare); Esophagogastroduodenoscopy  --diagnostic  Facility:                      [facility]  Date:                           5/19/2021  Endoscopist:             Ra Lowry MD, [facility]     Indication:  rectal bleeding. Cirrhosis of the liver for variceal screening    COLON  Findings:   1. Normal terminal ileum  2.  5 mm sessile polyp in the sigmoid colon resected with cold snare. Resection and retrieval was complete. This area was clipped with 2 metal resolution clips. 3.  Small rectal varices. 4.  Grade 2 internal hemorrhoids on retroflexion. 5.  Colon exam was otherwise normal    EGD  Findings[de-identified]   Esophagus: Small esophageal varices. Esophagus otherwise appeared normal.  Stomach: Moderate amount of portal hypertensive gastropathy in the fundus and body of the stomach.   The stomach was otherwise normal.  Duodenum: The duodenum appeared to have multiple duodenal varices in the second portion of the duodenum without any active bleeding. The duodenum otherwise appeared normal        Recommendations:  Repeat colonoscopy in 5 years. -Await pathology. Regular diet. Nonselective beta-blockers.     Electronically signed by Dean Bess MD, [facility] on 5/19/2021 at 4:51 PM    Principal Problem:    Alcoholic liver failure ([address]  Active Problems:    COPD (chronic obstructive pulmonary disease) (HCC)    Chronic hepatitis C without hepatic coma (HCC)    Marijuana abuse    Rectal bleed    Thrombocytopenia (HCC)    Abdominal ascites    Pedal edema    Lactic acidosis    Alcoholic cirrhosis of liver with ascites (HCC)    Jaundice    Abnormal INR    Hyponatremia    Hyperbilirubinemia  Resolved Problems:    * No resolved hospital problems. *       GI Assessment:  1. Decompensated alcoholic/hep C cirrhosis with ascites  MELD - 33 on admission    2. Abnormal LFTs - alcoholic hepatitis , ? Hemochromatosis  MDF - 78.7 (05/19/2021) - poor prognosis    3. Ascites - secondary to portal hypertension  Diagnostic tap in ED- No SBP . Cytology pending  05/19/2021 - herapeutic paracentesis - 4250 ml     4. Rectal bleeding -   05/19/2021 -colonoscopy-small rectal varices and grade 2 internal hemorrhoids    5. Elevated INR -secondary to synthetic dysfunction/liver disease    6. Thrombocytopenia -secondary to liver disease  7. Hyponatremia-secondary to beer portal shantell and alcoholism  8. Alcohol use disorder  9. Liver lesion < 5 mm -seen on CT but not seen on ultrasound. AFP 22.2.  -Will need repeat imaging in 3 to 6 months     Plan of care:    1. Patient would benefit from nonselective beta-blocker such as propanolol - current SBP in 90s. 2.  Patient to stop all alcohol. Discussed with patient  3. Patient will need close follow-up with GI in outpatient setting for management of cirrhosis and ascites. 4.  Continue PPI once daily  5. . Continue lactulose 20 g 3 times daily and titrate to 3-4 soft stools daily  6.   Continue current diuretics with Lasix 20 mg daily and Aldactone 25 mg daily      Please feel free to contact me with any questions or concerns. Thank you for allowing me to participate in the care of your patient. PEEWEE Hanson CNP on 5/20/2021 at 7:05 [facility] [address] Gastroenterology    Please note that this note was generated using a voice recognition dictation software. Although every effort was made to ensure the accuracy of this automated transcription, some errors in transcription may have occurred.

## 2021-05-20 NOTE — DISCHARGE INSTR - COC
Therapy and Occupational Therapy  Weight Bearing Status/Restrictions: No weight bearing restirctions  Other Medical Equipment (for information only, NOT a DME order):  walker  Other Treatments: skilled nursing    Patient's personal belongings (please select all that are sent with patient):  Glasses, jacket,coat/clothes    RN SIGNATURE:  Electronically signed by Citlali Ramírez RN on 5/20/21 at 11:53 AM EDT    CASE MANAGEMENT/SOCIAL WORK SECTION    Inpatient Status Date: ***    Readmission Risk Assessment Score:  Readmission Risk              Risk of Unplanned Readmission:  14           Discharging to Facility/ Agency   Name: [address]  Address:   [facility]ayo Ortiz [facility] [id number]         Phone: [phone]          / signature: Electronically signed by Trey Cantor RN on 5/20/21 at 11:43 AM EDT    PHYSICIAN SECTION    Prognosis: Poor    Condition at Discharge: Stable    Rehab Potential (if transferring to Rehab): Fair    Recommended Labs or Other Treatments After Discharge: none    Physician Certification: I certify the above information and transfer of Kalani Salcedo  is necessary for the continuing treatment of the diagnosis listed and that he requires [address] for greater 30 days.      Update Admission H&P: No change in H&P    PHYSICIAN SIGNATURE:  Electronically signed by Marsha Tamayo MD on 5/20/21 at 2:14 PM EDT

## 2021-05-20 NOTE — PROGRESS NOTES
[address] [place] [address]     Department of Internal Medicine - Staff Internal Medicine Service     DAILY PROGRESS NOTE       Patient:  Adriana Lazaro  YOB: 1955  MRN: [id number]     Acct: [de-identified]     Admit date: 5/16/2021  Admitting Diagnosis: Alcoholic liver failure ([facility]    Subjective:   Patient seen and examined at bedside    No acute events overnight, patient have infiltrates in left arm due to IV line, No bowel moment since 2 days. Does report of belly pain which is 7-8/10. Denies CP, fever, chills. s/p paracentesis of 4.25Lit 5/19/21 patient was hypotensive overnight. Received one bolus of 0.9% Nacl 500ml. Vitals BP 90/59, HR 87, RR 16    Colonoscopy show 5mm sessile polyp in the sigmoid colon resected with cold snare. Clipped with 2 metal resolution clips. Small rectal varices. Grade 2 internal hemorrhoids. EGD: small esophageal varices. Moderate amount of portal hypertensive gastropathy of the stomach. Multiple duodenal varices. EGD recommend repeat colonoscopy in 5 years, on nonselective beta blockers.      Plan to discharge him today to home with home care      Objective:   BP (!) 90/59   Pulse 87   Temp 97.6 °F (36.4 °C) (Axillary)   Resp 16   Ht 6' 1\" (1.854 m)   Wt 180 lb (81.6 kg)   SpO2 97%   BMI 23.75 kg/m²       General appearance:  alert, cooperative and no distress  Mental Status:  oriented to person, place and time and normal affect  Lungs:  clear to auscultation bilaterally, normal effort  Heart:  regular rate and rhythm, no murmur  Abdomen:  soft, nontender, distended with ascites, normal bowel sounds, no masses  Extremities:  + bilat LE edema, no redness, No clubbing  Skin:  warm, dry, no gross lesions or rashes       Intake/Output Summary (Last 24 hours) at 5/20/2021 0735  Last data filed at 5/20/2021 0438  Gross per 24 hour   Intake 860 ml   Output 600 ml   Net 260 ml         Medications:      sodium chloride  500 mL Intravenous Once    budesonide-formoterol  2 puff Inhalation BID    ketorolac  15 mg Intravenous Once    sodium chloride flush  5-40 mL Intravenous 2 times per day    cefTRIAXone (ROCEPHIN) IV  1,000 mg Intravenous Q24H    nicotine  1 patch Transdermal Daily    pantoprazole  40 mg Oral QAM AC    furosemide  20 mg Oral Daily    spironolactone  25 mg Oral Daily    lactulose  20 g Oral TID       Diagnostic Labs and Imaging    CBC:   Recent Labs     05/18/21  0505 05/19/21  0658 05/20/21  0553   WBC 8.0 9.7 7.4   RBC 4.39 4.31 3.66*   HGB 14.8 14.4 12.2*   HCT 41.9 40.3* 35.3*   MCV 95.4 93.5 96.4   RDW 16.4* 16.1* 16.6*   PLT See Reflexed IPF Result See Reflexed IPF Result See Reflexed IPF Result     BMP:   Recent Labs     05/18/21  0505 05/19/21  0658 05/20/21  0553   * 131* 136   K 3.6* 3.2* 3.8   CL 99 97* 103   CO2 20 21 21   BUN 17 20 23   CREATININE 0.22* 0.44* PENDING     BNP: No results for input(s): BNP in the last 72 hours. PT/INR:   Recent Labs     05/18/21  1559 05/19/21  0721 05/19/21  1147   PROTIME 29.8* 30.3* 25.0*   INR 3.1 3.1 2.5     APTT:   No results for input(s): APTT in the last 72 hours. LIVER PROFILE:   Recent Labs     05/18/21  0505 05/19/21  0658 05/20/21  0553   * 203* 187*   * 173* 149*   BILITOT 18.87* 18.92* PENDING   ALKPHOS 182* 165* 148*        Assessment and Plan:   1. Decompensated liver diseaselikely due to alcoholism/Hep C:  SAAG ratio 1.7.  Meld-Na score 33 points with estimated 90 days mortality 65 to 66%.  Child Balderas score class C. GI consulted. On lactulose, Lasix and Aldactone and IV ceftriaxone 1 g every 24. s/p EGD show moderate amount of portal hypertensive. Will add propanolol. 2. Rectal bleed: Monitor H&H.  Avoid constipation. S/p colonoscopy. 5mm sessile polyp  3.  Alcohol dependence: Encourage abstinence from drinking we will start him on thiamine folic acid, multivitamins.  Monitor withdrawal.  4. Alcoholic hepatitis: Monitor daily liver profile, PT/INR. 5. Elevated INR: s/p 2 dose of vitamin K. 1 unit of FFP. likely due to decompensated liver disease.  Monitor PT/INR. 6. Thrombocytopenia: 63k. Likely due to cirrhosis, alcoholism.  Will monitor blood counts  7. Lactic acidosis: Will trend lactic acid. 8. Hyperlipidemia likely due to # 1: Will start him on high-protein diet after procedures  9. COPD: On RT aerosol, and DuoNeb.       DVT ppx: None needed  GI ppx: On Protonix  Diet: NPO for procedures today.   Otherwise,  low-salt high-protein diet     PT/OT/SW: Consulted  Discharge Planning: discharge plan today with home care        Edin Jiang MD  Internal Medicine Resident PGY [address] [facility]   5/20/2021, 7:34 AM

## 2021-05-20 NOTE — ANESTHESIA POSTPROCEDURE EVALUATION
Department of Anesthesiology  Postprocedure Note    Patient: Melissa Juares  MRN: [id number]  YOB: 1955  Date of evaluation: 5/20/2021  Time:  6:40 AM     Procedure Summary     Date: 05/19/21 Room / Location: [address] [place] [address]    Anesthesia Start: 1620 Anesthesia Stop: 1700    Procedures:       EGD DIAGNOSTIC ONLY (N/A )      COLONOSCOPY POLYPECTOMY SNARE/COLD BIOPSY (N/A ) Diagnosis: (EV SCREENING RECTAL BLEEDING)    Surgeons: Gray Zlueta MD Responsible Provider: Leora Garza MD    Anesthesia Type: MAC ASA Status: 4          Anesthesia Type: MAC    Kali Phase I: Kali Score: 10    Kali Phase II: Kali Score: 10    Last vitals: Reviewed and per EMR flowsheets.        Anesthesia Post Evaluation    Patient location during evaluation: PACU  Patient participation: complete - patient participated  Level of consciousness: awake  Pain score: 1  Airway patency: patent  Nausea & Vomiting: no nausea and no vomiting  Complications: no  Cardiovascular status: blood pressure returned to baseline and hemodynamically stable  Respiratory status: acceptable  Hydration status: euvolemic

## 2021-05-20 NOTE — CARE COORDINATION
Notified Musa Jackson at [facility] of discharge today. They are able to accept for nursing and HHA. Met with pt. He is agreeable with plan of home with [facility]  He denies other needs and has transportation    Discharge [facility] Case Management Department  Written by: Willie Ling RN    Patient [facility]: [facility]  Attending Provider: Giuliano Gale MD  Admit Date: 2021 11:24 AM  MRN: [id number]  Account: [de-identified]                     : 1955  Discharge Date: 2021      Disposition: home with [facility] [address] RN

## 2021-05-20 NOTE — PROGRESS NOTES
CLINICAL PHARMACY NOTE: MEDS TO BEDS    Total # of Prescriptions Filled: 2   The following medications were delivered to the patient:  · Oxycodone  · Nicotine patches    Additional Documentation:

## 2021-05-21 ENCOUNTER — TELEPHONE (OUTPATIENT)
Dept: FAMILY MEDICINE CLINIC | Age: 66
End: 2021-05-21

## 2021-05-21 ENCOUNTER — APPOINTMENT (OUTPATIENT)
Dept: GENERAL RADIOLOGY | Age: 66
DRG: 280 | End: 2021-05-21
Payer: MEDICARE

## 2021-05-21 ENCOUNTER — HOSPITAL ENCOUNTER (INPATIENT)
Age: 66
LOS: 5 days | Discharge: HOSPICE | DRG: 280 | End: 2021-05-26
Attending: EMERGENCY MEDICINE | Admitting: FAMILY MEDICINE
Payer: MEDICARE

## 2021-05-21 DIAGNOSIS — K72.00 ACUTE LIVER FAILURE WITHOUT HEPATIC COMA: Primary | ICD-10-CM

## 2021-05-21 PROBLEM — R10.9 ABDOMINAL PAIN: Status: ACTIVE | Noted: 2021-05-21

## 2021-05-21 PROBLEM — R10.9 ABDOMINAL PAIN: Status: RESOLVED | Noted: 2021-05-21 | Resolved: 2021-05-21

## 2021-05-21 LAB
ABSOLUTE EOS #: 0.03 K/UL (ref 0–0.44)
ABSOLUTE IMMATURE GRANULOCYTE: 0.13 K/UL (ref 0–0.3)
ABSOLUTE LYMPH #: 0.7 K/UL (ref 1.1–3.7)
ABSOLUTE MONO #: 0.97 K/UL (ref 0.1–1.2)
ALBUMIN SERPL-MCNC: 2.4 G/DL (ref 3.5–5.2)
ALBUMIN/GLOBULIN RATIO: ABNORMAL (ref 1–2.5)
ALP BLD-CCNC: 197 U/L (ref 40–129)
ALT SERPL-CCNC: 169 U/L (ref 5–41)
AMMONIA: 27 UMOL/L (ref 16–60)
ANION GAP SERPL CALCULATED.3IONS-SCNC: 11 MMOL/L (ref 9–17)
AST SERPL-CCNC: 224 U/L
BASOPHILS # BLD: 1 % (ref 0–2)
BASOPHILS ABSOLUTE: 0.04 K/UL (ref 0–0.2)
BILIRUB SERPL-MCNC: 18.39 MG/DL (ref 0.3–1.2)
BUN BLDV-MCNC: 27 MG/DL (ref 8–23)
BUN/CREAT BLD: 35 (ref 9–20)
CALCIUM SERPL-MCNC: 9.1 MG/DL (ref 8.6–10.4)
CHLORIDE BLD-SCNC: 97 MMOL/L (ref 98–107)
CO2: 21 MMOL/L (ref 20–31)
CREAT SERPL-MCNC: 0.77 MG/DL (ref 0.7–1.2)
DIFFERENTIAL TYPE: ABNORMAL
EOSINOPHILS RELATIVE PERCENT: 0 % (ref 1–4)
GFR AFRICAN AMERICAN: >60 ML/MIN
GFR NON-AFRICAN AMERICAN: >60 ML/MIN
GFR SERPL CREATININE-BSD FRML MDRD: ABNORMAL ML/MIN/{1.73_M2}
GFR SERPL CREATININE-BSD FRML MDRD: ABNORMAL ML/MIN/{1.73_M2}
GLUCOSE BLD-MCNC: 124 MG/DL (ref 70–99)
HCT VFR BLD CALC: 39.2 % (ref 40.7–50.3)
HEMOGLOBIN: 13.6 G/DL (ref 13–17)
IMMATURE GRANULOCYTES: 2 %
INR BLD: 3.4
LYMPHOCYTES # BLD: 9 % (ref 24–43)
MCH RBC QN AUTO: 33.8 PG (ref 25.2–33.5)
MCHC RBC AUTO-ENTMCNC: 34.7 G/DL (ref 28.4–34.8)
MCV RBC AUTO: 97.5 FL (ref 82.6–102.9)
MONOCYTES # BLD: 12 % (ref 3–12)
MYOGLOBIN: 100 NG/ML (ref 28–72)
MYOGLOBIN: 116 NG/ML (ref 28–72)
MYOGLOBIN: 128 NG/ML (ref 28–72)
NRBC AUTOMATED: 0 PER 100 WBC
PARTIAL THROMBOPLASTIN TIME: 51.1 SEC (ref 23.9–33.8)
PDW BLD-RTO: 16.3 % (ref 11.8–14.4)
PLATELET # BLD: 70 K/UL (ref 138–453)
PLATELET ESTIMATE: ABNORMAL
PMV BLD AUTO: 10 FL (ref 8.1–13.5)
POTASSIUM SERPL-SCNC: 3.7 MMOL/L (ref 3.7–5.3)
PROTHROMBIN TIME: 33.4 SEC (ref 11.5–14.2)
RBC # BLD: 4.02 M/UL (ref 4.21–5.77)
RBC # BLD: ABNORMAL 10*6/UL
SEG NEUTROPHILS: 77 % (ref 36–65)
SEGMENTED NEUTROPHILS ABSOLUTE COUNT: 6.4 K/UL (ref 1.5–8.1)
SODIUM BLD-SCNC: 129 MMOL/L (ref 135–144)
SURGICAL PATHOLOGY REPORT: NORMAL
TOTAL PROTEIN: 6.1 G/DL (ref 6.4–8.3)
TROPONIN INTERP: ABNORMAL
TROPONIN T: ABNORMAL NG/ML
TROPONIN, HIGH SENSITIVITY: 15 NG/L (ref 0–22)
TROPONIN, HIGH SENSITIVITY: 16 NG/L (ref 0–22)
TROPONIN, HIGH SENSITIVITY: 18 NG/L (ref 0–22)
WBC # BLD: 8.3 K/UL (ref 3.5–11.3)
WBC # BLD: ABNORMAL 10*3/UL

## 2021-05-21 PROCEDURE — 85610 PROTHROMBIN TIME: CPT

## 2021-05-21 PROCEDURE — 83874 ASSAY OF MYOGLOBIN: CPT

## 2021-05-21 PROCEDURE — 82140 ASSAY OF AMMONIA: CPT

## 2021-05-21 PROCEDURE — 99222 1ST HOSP IP/OBS MODERATE 55: CPT | Performed by: NURSE PRACTITIONER

## 2021-05-21 PROCEDURE — 85025 COMPLETE CBC W/AUTO DIFF WBC: CPT

## 2021-05-21 PROCEDURE — 80053 COMPREHEN METABOLIC PANEL: CPT

## 2021-05-21 PROCEDURE — 2060000000 HC ICU INTERMEDIATE R&B

## 2021-05-21 PROCEDURE — 6360000002 HC RX W HCPCS: Performed by: PHYSICIAN ASSISTANT

## 2021-05-21 PROCEDURE — 85730 THROMBOPLASTIN TIME PARTIAL: CPT

## 2021-05-21 PROCEDURE — 99284 EMERGENCY DEPT VISIT MOD MDM: CPT

## 2021-05-21 PROCEDURE — 96375 TX/PRO/DX INJ NEW DRUG ADDON: CPT

## 2021-05-21 PROCEDURE — 96374 THER/PROPH/DIAG INJ IV PUSH: CPT

## 2021-05-21 PROCEDURE — 84484 ASSAY OF TROPONIN QUANT: CPT

## 2021-05-21 PROCEDURE — 2580000003 HC RX 258: Performed by: PHYSICIAN ASSISTANT

## 2021-05-21 PROCEDURE — 71045 X-RAY EXAM CHEST 1 VIEW: CPT

## 2021-05-21 RX ORDER — PANTOPRAZOLE SODIUM 40 MG/1
40 TABLET, DELAYED RELEASE ORAL
Status: DISCONTINUED | OUTPATIENT
Start: 2021-05-22 | End: 2021-05-26 | Stop reason: HOSPADM

## 2021-05-21 RX ORDER — MORPHINE SULFATE 4 MG/ML
4 INJECTION, SOLUTION INTRAMUSCULAR; INTRAVENOUS ONCE
Status: COMPLETED | OUTPATIENT
Start: 2021-05-21 | End: 2021-05-21

## 2021-05-21 RX ORDER — SODIUM CHLORIDE 0.9 % (FLUSH) 0.9 %
5-40 SYRINGE (ML) INJECTION PRN
Status: DISCONTINUED | OUTPATIENT
Start: 2021-05-21 | End: 2021-05-24 | Stop reason: SDUPTHER

## 2021-05-21 RX ORDER — SODIUM CHLORIDE 9 MG/ML
25 INJECTION, SOLUTION INTRAVENOUS PRN
Status: DISCONTINUED | OUTPATIENT
Start: 2021-05-21 | End: 2021-05-26

## 2021-05-21 RX ORDER — SODIUM CHLORIDE 9 MG/ML
INJECTION, SOLUTION INTRAVENOUS CONTINUOUS
Status: DISCONTINUED | OUTPATIENT
Start: 2021-05-22 | End: 2021-05-23

## 2021-05-21 RX ORDER — LACTULOSE 10 G/15ML
20 SOLUTION ORAL 3 TIMES DAILY
Status: DISCONTINUED | OUTPATIENT
Start: 2021-05-22 | End: 2021-05-25

## 2021-05-21 RX ORDER — ONDANSETRON 2 MG/ML
4 INJECTION INTRAMUSCULAR; INTRAVENOUS EVERY 6 HOURS PRN
Status: DISCONTINUED | OUTPATIENT
Start: 2021-05-21 | End: 2021-05-26 | Stop reason: HOSPADM

## 2021-05-21 RX ORDER — MULTIVITAMIN WITH IRON
1 TABLET ORAL DAILY
Status: DISCONTINUED | OUTPATIENT
Start: 2021-05-22 | End: 2021-05-26 | Stop reason: HOSPADM

## 2021-05-21 RX ORDER — PROMETHAZINE HYDROCHLORIDE 12.5 MG/1
12.5 TABLET ORAL EVERY 6 HOURS PRN
Status: DISCONTINUED | OUTPATIENT
Start: 2021-05-21 | End: 2021-05-26 | Stop reason: HOSPADM

## 2021-05-21 RX ORDER — POTASSIUM CHLORIDE 7.45 MG/ML
10 INJECTION INTRAVENOUS PRN
Status: DISCONTINUED | OUTPATIENT
Start: 2021-05-21 | End: 2021-05-26 | Stop reason: HOSPADM

## 2021-05-21 RX ORDER — BUDESONIDE AND FORMOTEROL FUMARATE DIHYDRATE 160; 4.5 UG/1; UG/1
2 AEROSOL RESPIRATORY (INHALATION) 2 TIMES DAILY
Status: DISCONTINUED | OUTPATIENT
Start: 2021-05-22 | End: 2021-05-26 | Stop reason: HOSPADM

## 2021-05-21 RX ORDER — ONDANSETRON 2 MG/ML
4 INJECTION INTRAMUSCULAR; INTRAVENOUS ONCE
Status: COMPLETED | OUTPATIENT
Start: 2021-05-21 | End: 2021-05-21

## 2021-05-21 RX ORDER — SODIUM CHLORIDE 0.9 % (FLUSH) 0.9 %
5-40 SYRINGE (ML) INJECTION EVERY 12 HOURS SCHEDULED
Status: DISCONTINUED | OUTPATIENT
Start: 2021-05-22 | End: 2021-05-24 | Stop reason: SDUPTHER

## 2021-05-21 RX ORDER — 0.9 % SODIUM CHLORIDE 0.9 %
250 INTRAVENOUS SOLUTION INTRAVENOUS ONCE
Status: COMPLETED | OUTPATIENT
Start: 2021-05-21 | End: 2021-05-21

## 2021-05-21 RX ORDER — GAUZE BANDAGE 2" X 2"
100 BANDAGE TOPICAL DAILY
Status: DISCONTINUED | OUTPATIENT
Start: 2021-05-22 | End: 2021-05-26 | Stop reason: HOSPADM

## 2021-05-21 RX ORDER — SPIRONOLACTONE 25 MG/1
25 TABLET ORAL DAILY
Status: DISCONTINUED | OUTPATIENT
Start: 2021-05-22 | End: 2021-05-26 | Stop reason: HOSPADM

## 2021-05-21 RX ORDER — FOLIC ACID 1 MG/1
1 TABLET ORAL DAILY
Status: DISCONTINUED | OUTPATIENT
Start: 2021-05-22 | End: 2021-05-26 | Stop reason: HOSPADM

## 2021-05-21 RX ORDER — OXYCODONE HYDROCHLORIDE 5 MG/1
5 TABLET ORAL EVERY 8 HOURS PRN
Status: DISCONTINUED | OUTPATIENT
Start: 2021-05-21 | End: 2021-05-26

## 2021-05-21 RX ORDER — NICOTINE 21 MG/24HR
1 PATCH, TRANSDERMAL 24 HOURS TRANSDERMAL EVERY 24 HOURS
Status: DISCONTINUED | OUTPATIENT
Start: 2021-05-22 | End: 2021-05-26 | Stop reason: HOSPADM

## 2021-05-21 RX ORDER — ACETAMINOPHEN 325 MG/1
650 TABLET ORAL EVERY 4 HOURS PRN
Status: DISCONTINUED | OUTPATIENT
Start: 2021-05-21 | End: 2021-05-26 | Stop reason: HOSPADM

## 2021-05-21 RX ORDER — MAGNESIUM SULFATE 1 G/100ML
1000 INJECTION INTRAVENOUS PRN
Status: DISCONTINUED | OUTPATIENT
Start: 2021-05-21 | End: 2021-05-26 | Stop reason: HOSPADM

## 2021-05-21 RX ORDER — ALBUTEROL SULFATE 2.5 MG/3ML
2.5 SOLUTION RESPIRATORY (INHALATION)
Status: DISCONTINUED | OUTPATIENT
Start: 2021-05-21 | End: 2021-05-26 | Stop reason: HOSPADM

## 2021-05-21 RX ORDER — FUROSEMIDE 20 MG/1
20 TABLET ORAL DAILY
Status: DISCONTINUED | OUTPATIENT
Start: 2021-05-22 | End: 2021-05-26 | Stop reason: HOSPADM

## 2021-05-21 RX ADMIN — SODIUM CHLORIDE 250 ML: 9 INJECTION, SOLUTION INTRAVENOUS at 16:20

## 2021-05-21 RX ADMIN — ONDANSETRON 4 MG: 2 INJECTION INTRAMUSCULAR; INTRAVENOUS at 14:27

## 2021-05-21 RX ADMIN — MORPHINE SULFATE 4 MG: 4 INJECTION, SOLUTION INTRAMUSCULAR; INTRAVENOUS at 14:27

## 2021-05-21 ASSESSMENT — PAIN SCALES - GENERAL: PAINLEVEL_OUTOF10: 10

## 2021-05-21 NOTE — ED PROVIDER NOTES
[address] [place] [address] ED  [facility]      Pt Name: Joselyn Pisano  MRN: [id number]  [place] 1955  Date ofevaluation: 5/21/2021  Provider: Beau Puckett PA-C    CHIEF COMPLAINT       Chief Complaint   Patient presents with    Abdominal Pain    Other     jaundice         HISTORY OF PRESENT ILLNESS  (Location/Symptom, Timing/Onset, Context/Setting, Quality, Duration, Modifying Factors, Severity.)   Joselyn Pisano is a 77 y.o. male who presents to the emergency department with jaundice and swelling. Patient was recently admitted to the hospital and discharged yesterday for alcoholic cirrhosis of the liver with ascites. Patient underwent scopes due to rectal bleeding at that time as well. Apparently patient feels that his abdomen is increasing in size and swelling also swelling located lower extremities. Therefore patient was directed back to emergency room for further evaluation. Nursing Notes were reviewed. ALLERGIES     Pcn [penicillins], Vicodin [hydrocodone-acetaminophen], and Motrin [ibuprofen]    CURRENT MEDICATIONS       Previous Medications    ALBUTEROL SULFATE HFA (PROVENTIL HFA) 108 (90 BASE) MCG/ACT INHALER    Inhale 2 puffs into the lungs every 6 hours as needed for Wheezing    FOLIC ACID (FOLVITE) 1 MG TABLET    Take 1 tablet by mouth daily    FUROSEMIDE (LASIX) 20 MG TABLET    Take 1 tablet by mouth daily    LACTULOSE (CHRONULAC) 10 GM/15ML SOLUTION    Take 30 mLs by mouth 3 times daily    MOMETASONE-FORMOTEROL (DULERA) 100-5 MCG/ACT INHALER    Inhale 2 puffs into the lungs 2 times daily    MULTIPLE VITAMIN (MULTIVITAMIN) TABS TABLET    Take 1 tablet by mouth daily    NICOTINE (NICODERM CQ) 21 MG/24HR    Place 1 patch onto the skin every 24 hours    OXYCODONE (ROXICODONE) 5 MG IMMEDIATE RELEASE TABLET    Take 1 tablet by mouth every 8 hours as needed for Pain for up to 3 days. Intended supply: 5 days.  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    Cervical spine fracture (HCC)     C7, after fall, no surgery    COPD ([facility] disease) [facility]     Depression     H/O: substance abuse ([facility]     etoh,marijuana    Hep C w/o coma, chronic ([facility] 10/15/2014    Hepatitis C     History of suicidal tendencies     Hx: UTI (urinary tract infection)     Schizophrenia, schizo-affective ([facility]        SURGICAL HISTORY           Procedure Laterality Date    CATARACT REMOVAL WITH IMPLANT      Left eye    COLONOSCOPY N/A 5/19/2021    COLONOSCOPY POLYPECTOMY SNARE/COLD BIOPSY performed by Ra Lowry MD at [address] Left 8/27/13    repair of ruptured globe; vitrectomy    EYE SURGERY Left 9/24/13    enuculation    HERNIA REPAIR      inguinal    UMBILICAL HERNIA REPAIR      UPPER GASTROINTESTINAL ENDOSCOPY N/A 5/19/2021    EGD DIAGNOSTIC ONLY performed by Ra Lowry MD at [address]      Leg vein    VITRECTOMY Left 08/27/2013    and globe repair         FAMILY HISTORY     No family history on file. No family status information on file. SOCIAL HISTORY      reports that he has been smoking cigarettes. He has a 150.00 pack-year smoking history. He has never used smokeless tobacco. He reports previous alcohol use of about 28.0 standard drinks of alcohol per week. He reports current drug use. Drug: Marijuana. REVIEW OFSYSTEMS    (2-9 systems for level 4, 10 or more for level 5)   Review of Systems    Except as noted above the remainder of the review of systems was reviewed and negative.      PHYSICAL EXAM    (up to 7 for level 4, 8 or more 08:14:05 PM      PATIENTREFERRED TO:   No follow-up provider specified.     DISCHARGE MEDICATIONS:     New Prescriptions    No medications on file           (Please note that portions of this note were completed with a voice recognition program.  Efforts were made to edit thedictations but occasionally words are mis-transcribed.)    STEPHANIE Hughes PA-C  05/21/21 [id number]

## 2021-05-21 NOTE — TELEPHONE ENCOUNTER
Called to schedule trans appt for patient. Was informed pt feet are swelling again, urine output is low, not eating, and eyes are yellow. Felt pt should be taken back to hospital and I advised they can take him for evaluation we will call to schedule after.

## 2021-05-21 NOTE — TELEPHONE ENCOUNTER
[facility] Transitions Initial Follow Up Call    Outreach made within 2 business days of discharge: yes    Patient: Kirby Lentz Patient : 1955   MRN: [id number]  Reason for Admission: There are no discharge diagnoses documented for the most recent discharge. Discharge Date: 21       Spoke with: Vineet Gonzales     Discharge department/facility [facility]    TCM Interactive Patient Contact:  Was patient able to fill all prescriptions: Yes  Was patient instructed to bring all medications to the follow-up visit: Yes  Is patient taking all medications as directed in the discharge summary?  Yes  Does patient understand their discharge instructions: Yes  Does patient have questions or concerns that need addressed prior to 7-14 day follow up office visit: no    Scheduled appointment with PCP within 7-14 days    Follow Up  Future Appointments   Date Time Provider Michael Ko   2021  1:30 PM PEEWEE Nielsen [address] [facility] [place]   2021 10:30 AM Matt Lane MD [address] [place] [address]

## 2021-05-21 NOTE — ED PROVIDER NOTES
[address] [place] [address] ED  EMERGENCY DEPARTMENT ENCOUNTER   ATTENDING ATTESTATION     Pt Name: Joselyn Pisano  MRN: [id number]  Dipika 1955  Date of evaluation: 5/21/21       Joselyn Pisano is a 77 y.o. male who presents with Abdominal Pain and Other (jaundice)      MDM:     This is a 78-year-old male that presents with complaints of some jaundice and abdominal pain. History of cirrhosis of the liver secondary to hepatitis C as well as alcohol abuse. The patient has significant liver dysfunction, his laboratory studies today show an elevated bilirubin, his white blood cell count is unremarkable, there is no obvious evidence of infection. He has very mild abdominal swelling, he does not appear to have an appreciable fluid wave, I do not believe he is a candidate for paracentesis at this time. The patient's INR is significantly elevated. The plan at this time is discharged with outpatient follow-up at gastroenterology. I do not believe he requires admission, he is awake and alert, he is oriented x3, he is not hypoxemic, his vital signs are stable. Vitals:   Vitals:    05/21/21 1515 05/21/21 1517 05/21/21 1530 05/21/21 1545   BP: 99/65  103/89 112/80   Pulse: 83  84 85   Resp: (!) 7 16 (!) 7 13   Temp:       TempSrc:       SpO2: 96%  97% 96%   Weight:       Height:             I personally evaluated and examined the patient in conjunction with the Midlevel provider and agree with the assessment, treatment plan, and disposition of the patient as recorded by the midlevel. I performed a history and physical examination of the patient and discussed management with the midlevel. I reviewed the midlevels note and agree with the documented findings and plan of care. Any areas of disagreement are noted on the chart. I was personally present for the key portions of any procedures. I have documented in the chart those procedures where I was not present during the key portions.  I have personally reviewed all images and agree with the midlevel's interpretation. I have reviewed the emergency nurses triage note. I agree with the chief complaint, past medical history, past surgical history, allergies, medications, social and family history as documented unless otherwise noted.     Samantha Oro MD  Attending Emergency  Physician                  John Gonzalez MD  05/21/21 [id number]

## 2021-05-21 NOTE — DISCHARGE SUMMARY
Less than 5 mm hypoattenuating liver lesion in left hepatic lobe. Moderate  abdominopelvic ascites. hypertensive colopathy versus inflammatory colitis. Diagnostic paracentesis was done in the ER so RBC 5000, neutrophil count 30, WBC 81. SAAG ratio less than 1. 7.US abdomen negative for portal vein thrombosis. And show nodular cirrhotic liver with large volume ascites. IR consulted removed 4250ml ascitic fluid. GI was consulted recommend EGD an colonoscopy. On discharge patient was vitally stable.     Colonoscopy show 5mm sessile polyp in the sigmoid colon resected with cold snare. Clipped with 2 metal resolution clips. Small rectal varices. Grade 2 internal hemorrhoids. EGD: small esophageal varices. Moderate amount of portal hypertensive gastropathy of the stomach. Multiple duodenal varices. EGD recommend repeat colonoscopy in 5 years, on nonselective beta blockers. Plan to discharge him home with home care    significant therapeutic interventions done at the hospital:   1. Decompensated liver diseaselikely due to alcoholism/Hep C:  SAAG ratio 1.7.  Meld-Na score 33 points with estimated 90 days mortality 65 to 66%.  Child Balderas score class C. GI consulted. On lactulose, Lasix and Aldactone and IV ceftriaxone 1 g every 24. s/p EGD show moderate amount of portal hypertensive. Will add propanolol. 2. Rectal bleed: Monitor H&H.  Avoid constipation. S/p colonoscopy. 5mm sessile polyp  3. Alcohol dependence: Encourage abstinence from drinking we will start him on thiamine folic acid, multivitamins.  Monitor withdrawal.  4. Alcoholic hepatitis: Monitor daily liver profile, PT/INR. 5. Elevated INR: s/p 2 dose of vitamin K. 1 unit of FFP. likely due to decompensated liver disease.  Monitor PT/INR. 6. Thrombocytopenia: 63k. Likely due to cirrhosis, alcoholism.  Will monitor blood counts  7. Lactic acidosis: Will trend lactic acid.    8. Hyperlipidemia likely due to # 1: Will start him on high-protein diet after procedures  9. COPD: On RT aerosol, and DuoNeb. Procedures/ Significant Interventions:    COLON 5/19/21  Findings:   1.  Normal terminal ileum  2.  5 mm sessile polyp in the sigmoid colon resected with cold snare.  Resection and retrieval was complete.  This area was clipped with 2 metal resolution clips. 3.  Small rectal varices. 4.  Grade 2 internal hemorrhoids on retroflexion. 5.  Colon exam was otherwise normal     EGD 5/19/21  Findings[de-identified]   Esophagus: Small esophageal varices.  Esophagus otherwise appeared normal.  Stomach: Moderate amount of portal hypertensive gastropathy in the fundus and body of the stomach.  The stomach was otherwise normal.  Duodenum: The duodenum appeared to have multiple duodenal varices in the second portion of the duodenum without any active bleeding.  The duodenum otherwise appeared normal    Consults:     Consults:     Final Specialist Recommendations/Findings:   IP CONSULT TO SOCIAL WORK  IP CONSULT TO INTERNAL MEDICINE  IP CONSULT TO GI  IP CONSULT TO CASE MANAGEMENT  IP CONSULT TO IV TEAM  IP CONSULT TO IV TEAM  IP CONSULT TO IV TEAM  IP CONSULT TO HOME CARE NEEDS      Any Hospital Acquired Infections: none    Discharge Functional Status:  stable    DISCHARGE PLAN     Disposition: home    Patient Instructions:   Discharge Medication List as of 5/20/2021  2:32 PM      START taking these medications    Details   mometasone-formoterol (DULERA) 100-5 MCG/ACT inhaler Inhale 2 puffs into the lungs 2 times daily, Disp-1 Inhaler, R-2Normal      oxyCODONE (ROXICODONE) 5 MG immediate release tablet Take 1 tablet by mouth every 8 hours as needed for Pain for up to 3 days. Intended supply: 5 days.  Take lowest dose possible to manage pain, Disp-9 tablet, R-0Print      nicotine (NICODERM CQ) 21 MG/24HR Place 1 patch onto the skin every 24 hours, Disp-30 patch, R-1Normal         CONTINUE these medications which have CHANGED    Details   pantoprazole (PROTONIX) 40 MG tablet Take 1 tablet by mouth every morning (before breakfast), Disp-30 tablet, R-3Normal      vitamin B-1 (THIAMINE) 100 MG tablet Take 1 tablet by mouth daily, Disp-30 tablet, R-2Normal      Multiple Vitamin (MULTIVITAMIN) TABS tablet Take 1 tablet by mouth daily, Disp-30 tablet, R-2Normal      furosemide (LASIX) 20 MG tablet Take 1 tablet by mouth daily, Disp-60 tablet, R-3Normal      spironolactone (ALDACTONE) 25 MG tablet Take 1 tablet by mouth daily, Disp-30 tablet, R-1BNSTAV      folic acid (FOLVITE) 1 MG tablet Take 1 tablet by mouth daily, Disp-30 tablet, R-3Normal      lactulose (CHRONULAC) 10 GM/15ML solution Take 30 mLs by mouth 3 times daily, Disp-2 Bottle, R-2Normal      albuterol sulfate HFA (PROVENTIL HFA) 108 (90 Base) MCG/ACT inhaler Inhale 2 puffs into the lungs every 6 hours as needed for Wheezing, Disp-1 Inhaler, R-3Normal         STOP taking these medications       Mometasone Furo-Formoterol Fum 50-5 MCG/ACT AERO Comments:   Reason for Stopping:         gabapentin (NEURONTIN) 300 MG capsule Comments:   Reason for Stopping:         Multiple Vitamins-Minerals (THERAPEUTIC MULTIVITAMIN-MINERALS) tablet Comments:   Reason for Stopping:         escitalopram (LEXAPRO) 20 MG tablet Comments:   Reason for Stopping:         risperiDONE (RISPERDAL) 1 MG tablet Comments:   Reason for Stopping:               Activity: activity as tolerated    Diet: regular diet    Follow-up:    [facility]  [address] [facility] [address]  [address] [facility] [id number] [phone]  Schedule an appointment as soon as possible for a visit  For Hospital F/U, cirrhosis     Ziggy Rock MD  [address] [phone]    Call  For Hospital F/U      Patient Instructions: Follow up with PCP   Follow up with GI for OP hepatitis C and cirrhosis management         Ziggy Rock MD, MD  Internal Medicine Resident, PGY-1  [address], [place]  5/21/2021, 10:34 AM

## 2021-05-22 LAB
-: ABNORMAL
ALBUMIN SERPL-MCNC: 2.5 G/DL (ref 3.5–5.2)
ALBUMIN/GLOBULIN RATIO: ABNORMAL (ref 1–2.5)
ALP BLD-CCNC: 195 U/L (ref 40–129)
ALT SERPL-CCNC: 168 U/L (ref 5–41)
AMORPHOUS: ABNORMAL
AMYLASE: 89 U/L (ref 28–100)
ANION GAP SERPL CALCULATED.3IONS-SCNC: 12 MMOL/L (ref 9–17)
ANION GAP SERPL CALCULATED.3IONS-SCNC: 13 MMOL/L (ref 9–17)
ANION GAP SERPL CALCULATED.3IONS-SCNC: 14 MMOL/L (ref 9–17)
AST SERPL-CCNC: 219 U/L
BACTERIA: ABNORMAL
BILIRUB SERPL-MCNC: 19.63 MG/DL (ref 0.3–1.2)
BILIRUBIN URINE: ABNORMAL
BUN BLDV-MCNC: 32 MG/DL (ref 8–23)
BUN/CREAT BLD: 33 (ref 9–20)
CALCIUM IONIZED: 1.2 MMOL/L (ref 1.13–1.33)
CALCIUM SERPL-MCNC: 9 MG/DL (ref 8.6–10.4)
CASTS UA: ABNORMAL /LPF
CHLORIDE BLD-SCNC: 96 MMOL/L (ref 98–107)
CO2: 19 MMOL/L (ref 20–31)
CO2: 20 MMOL/L (ref 20–31)
CO2: 20 MMOL/L (ref 20–31)
COLOR: ABNORMAL
COMMENT UA: ABNORMAL
CREAT SERPL-MCNC: 0.97 MG/DL (ref 0.7–1.2)
CRYSTALS, UA: ABNORMAL /HPF
CULTURE: ABNORMAL
CULTURE: NORMAL
CULTURE: NORMAL
DIRECT EXAM: ABNORMAL
EPITHELIAL CELLS UA: ABNORMAL /HPF (ref 0–5)
GFR AFRICAN AMERICAN: >60 ML/MIN
GFR NON-AFRICAN AMERICAN: >60 ML/MIN
GFR SERPL CREATININE-BSD FRML MDRD: ABNORMAL ML/MIN/{1.73_M2}
GFR SERPL CREATININE-BSD FRML MDRD: ABNORMAL ML/MIN/{1.73_M2}
GLUCOSE BLD-MCNC: 87 MG/DL (ref 70–99)
GLUCOSE URINE: ABNORMAL
HCT VFR BLD CALC: 38.1 % (ref 40.7–50.3)
HCT VFR BLD CALC: 40.8 % (ref 40.7–50.3)
HCT VFR BLD CALC: 42 % (ref 40.7–50.3)
HEMOGLOBIN: 13.2 G/DL (ref 13–17)
HEMOGLOBIN: 14 G/DL (ref 13–17)
HEMOGLOBIN: 14.3 G/DL (ref 13–17)
INR BLD: 3.7
KETONES, URINE: NEGATIVE
LEUKOCYTE ESTERASE, URINE: NEGATIVE
LIPASE: 35 U/L (ref 13–60)
Lab: ABNORMAL
Lab: NORMAL
Lab: NORMAL
MAGNESIUM: 2.2 MG/DL (ref 1.6–2.6)
MCH RBC QN AUTO: 33.8 PG (ref 25.2–33.5)
MCHC RBC AUTO-ENTMCNC: 34.6 G/DL (ref 28.4–34.8)
MCV RBC AUTO: 97.4 FL (ref 82.6–102.9)
MUCUS: ABNORMAL
NITRITE, URINE: NEGATIVE
NRBC AUTOMATED: 0 PER 100 WBC
OSMOLALITY URINE: 514 MOSM/KG (ref 300–1170)
OTHER OBSERVATIONS UA: ABNORMAL
PDW BLD-RTO: 16.3 % (ref 11.8–14.4)
PH UA: 6 (ref 5–8)
PHOSPHORUS: 4.4 MG/DL (ref 2.5–4.5)
PLATELET # BLD: 76 K/UL (ref 138–453)
PMV BLD AUTO: 11 FL (ref 8.1–13.5)
POTASSIUM SERPL-SCNC: 3.9 MMOL/L (ref 3.7–5.3)
POTASSIUM SERPL-SCNC: 4.4 MMOL/L (ref 3.7–5.3)
POTASSIUM SERPL-SCNC: 4.5 MMOL/L (ref 3.7–5.3)
PROTEIN UA: ABNORMAL
PROTHROMBIN TIME: 35.4 SEC (ref 11.5–14.2)
RBC # BLD: 3.91 M/UL (ref 4.21–5.77)
RBC UA: ABNORMAL /HPF (ref 0–2)
RENAL EPITHELIAL, UA: ABNORMAL /HPF
SODIUM BLD-SCNC: 127 MMOL/L (ref 135–144)
SODIUM BLD-SCNC: 129 MMOL/L (ref 135–144)
SODIUM BLD-SCNC: 130 MMOL/L (ref 135–144)
SODIUM,UR: 20 MMOL/L
SPECIFIC GRAVITY UA: 1.02 (ref 1–1.03)
SPECIMEN DESCRIPTION: ABNORMAL
SPECIMEN DESCRIPTION: NORMAL
SPECIMEN DESCRIPTION: NORMAL
TOTAL PROTEIN: 6 G/DL (ref 6.4–8.3)
TRICHOMONAS: ABNORMAL
TRIGL SERPL-MCNC: 60 MG/DL
TROPONIN INTERP: NORMAL
TROPONIN T: NORMAL NG/ML
TROPONIN, HIGH SENSITIVITY: 16 NG/L (ref 0–22)
TROPONIN, HIGH SENSITIVITY: 19 NG/L (ref 0–22)
TROPONIN, HIGH SENSITIVITY: 20 NG/L (ref 0–22)
TURBIDITY: ABNORMAL
URINE HGB: NEGATIVE
UROBILINOGEN, URINE: ABNORMAL
WBC # BLD: 10.1 K/UL (ref 3.5–11.3)
WBC UA: ABNORMAL /HPF (ref 0–5)
YEAST: ABNORMAL

## 2021-05-22 PROCEDURE — 84157 ASSAY OF PROTEIN OTHER: CPT

## 2021-05-22 PROCEDURE — 83735 ASSAY OF MAGNESIUM: CPT

## 2021-05-22 PROCEDURE — 87070 CULTURE OTHR SPECIMN AEROBIC: CPT

## 2021-05-22 PROCEDURE — 89051 BODY FLUID CELL COUNT: CPT

## 2021-05-22 PROCEDURE — 93005 ELECTROCARDIOGRAM TRACING: CPT | Performed by: FAMILY MEDICINE

## 2021-05-22 PROCEDURE — 2060000000 HC ICU INTERMEDIATE R&B

## 2021-05-22 PROCEDURE — 81001 URINALYSIS AUTO W/SCOPE: CPT

## 2021-05-22 PROCEDURE — 80051 ELECTROLYTE PANEL: CPT

## 2021-05-22 PROCEDURE — 6360000002 HC RX W HCPCS: Performed by: NURSE PRACTITIONER

## 2021-05-22 PROCEDURE — 82330 ASSAY OF CALCIUM: CPT

## 2021-05-22 PROCEDURE — 99255 IP/OBS CONSLTJ NEW/EST HI 80: CPT | Performed by: INTERNAL MEDICINE

## 2021-05-22 PROCEDURE — 6370000000 HC RX 637 (ALT 250 FOR IP): Performed by: NURSE PRACTITIONER

## 2021-05-22 PROCEDURE — 84484 ASSAY OF TROPONIN QUANT: CPT

## 2021-05-22 PROCEDURE — 87205 SMEAR GRAM STAIN: CPT

## 2021-05-22 PROCEDURE — 84478 ASSAY OF TRIGLYCERIDES: CPT

## 2021-05-22 PROCEDURE — 94640 AIRWAY INHALATION TREATMENT: CPT

## 2021-05-22 PROCEDURE — 99232 SBSQ HOSP IP/OBS MODERATE 35: CPT | Performed by: FAMILY MEDICINE

## 2021-05-22 PROCEDURE — 2580000003 HC RX 258: Performed by: NURSE PRACTITIONER

## 2021-05-22 PROCEDURE — 84300 ASSAY OF URINE SODIUM: CPT

## 2021-05-22 PROCEDURE — 85027 COMPLETE CBC AUTOMATED: CPT

## 2021-05-22 PROCEDURE — 87075 CULTR BACTERIA EXCEPT BLOOD: CPT

## 2021-05-22 PROCEDURE — 85610 PROTHROMBIN TIME: CPT

## 2021-05-22 PROCEDURE — 80053 COMPREHEN METABOLIC PANEL: CPT

## 2021-05-22 PROCEDURE — 84100 ASSAY OF PHOSPHORUS: CPT

## 2021-05-22 PROCEDURE — 83690 ASSAY OF LIPASE: CPT

## 2021-05-22 PROCEDURE — 87522 HEPATITIS C REVRS TRNSCRPJ: CPT

## 2021-05-22 PROCEDURE — 2500000003 HC RX 250 WO HCPCS: Performed by: NURSE PRACTITIONER

## 2021-05-22 PROCEDURE — 85018 HEMOGLOBIN: CPT

## 2021-05-22 PROCEDURE — 82150 ASSAY OF AMYLASE: CPT

## 2021-05-22 PROCEDURE — 85014 HEMATOCRIT: CPT

## 2021-05-22 PROCEDURE — 94760 N-INVAS EAR/PLS OXIMETRY 1: CPT

## 2021-05-22 PROCEDURE — 82042 OTHER SOURCE ALBUMIN QUAN EA: CPT

## 2021-05-22 PROCEDURE — 83935 ASSAY OF URINE OSMOLALITY: CPT

## 2021-05-22 PROCEDURE — 36415 COLL VENOUS BLD VENIPUNCTURE: CPT

## 2021-05-22 RX ORDER — LORAZEPAM 1 MG/1
1 TABLET ORAL
Status: DISCONTINUED | OUTPATIENT
Start: 2021-05-22 | End: 2021-05-25

## 2021-05-22 RX ORDER — LORAZEPAM 2 MG/ML
4 INJECTION INTRAMUSCULAR
Status: DISCONTINUED | OUTPATIENT
Start: 2021-05-22 | End: 2021-05-25

## 2021-05-22 RX ORDER — LORAZEPAM 1 MG/1
3 TABLET ORAL
Status: DISCONTINUED | OUTPATIENT
Start: 2021-05-22 | End: 2021-05-25

## 2021-05-22 RX ORDER — SODIUM CHLORIDE 9 MG/ML
25 INJECTION, SOLUTION INTRAVENOUS PRN
Status: DISCONTINUED | OUTPATIENT
Start: 2021-05-22 | End: 2021-05-26 | Stop reason: HOSPADM

## 2021-05-22 RX ORDER — SODIUM CHLORIDE 0.9 % (FLUSH) 0.9 %
5-40 SYRINGE (ML) INJECTION PRN
Status: DISCONTINUED | OUTPATIENT
Start: 2021-05-22 | End: 2021-05-26 | Stop reason: HOSPADM

## 2021-05-22 RX ORDER — LORAZEPAM 1 MG/1
4 TABLET ORAL
Status: DISCONTINUED | OUTPATIENT
Start: 2021-05-22 | End: 2021-05-25

## 2021-05-22 RX ORDER — LORAZEPAM 2 MG/ML
3 INJECTION INTRAMUSCULAR
Status: DISCONTINUED | OUTPATIENT
Start: 2021-05-22 | End: 2021-05-25

## 2021-05-22 RX ORDER — LORAZEPAM 1 MG/1
2 TABLET ORAL
Status: DISCONTINUED | OUTPATIENT
Start: 2021-05-22 | End: 2021-05-25

## 2021-05-22 RX ORDER — PENTOXIFYLLINE 400 MG/1
400 TABLET, EXTENDED RELEASE ORAL
Status: DISCONTINUED | OUTPATIENT
Start: 2021-05-22 | End: 2021-05-26 | Stop reason: HOSPADM

## 2021-05-22 RX ORDER — SODIUM CHLORIDE 0.9 % (FLUSH) 0.9 %
5-40 SYRINGE (ML) INJECTION EVERY 12 HOURS SCHEDULED
Status: DISCONTINUED | OUTPATIENT
Start: 2021-05-22 | End: 2021-05-26 | Stop reason: HOSPADM

## 2021-05-22 RX ORDER — LORAZEPAM 2 MG/ML
2 INJECTION INTRAMUSCULAR
Status: DISCONTINUED | OUTPATIENT
Start: 2021-05-22 | End: 2021-05-25

## 2021-05-22 RX ORDER — LORAZEPAM 2 MG/ML
1 INJECTION INTRAMUSCULAR
Status: DISCONTINUED | OUTPATIENT
Start: 2021-05-22 | End: 2021-05-25

## 2021-05-22 RX ADMIN — MULTIVITAMIN TABLET 1 TABLET: TABLET at 10:26

## 2021-05-22 RX ADMIN — SODIUM CHLORIDE: 9 INJECTION, SOLUTION INTRAVENOUS at 01:00

## 2021-05-22 RX ADMIN — BUDESONIDE AND FORMOTEROL FUMARATE DIHYDRATE 2 PUFF: 160; 4.5 AEROSOL RESPIRATORY (INHALATION) at 18:35

## 2021-05-22 RX ADMIN — LACTULOSE 20 G: 20 SOLUTION ORAL at 19:34

## 2021-05-22 RX ADMIN — OXYCODONE 5 MG: 5 TABLET ORAL at 01:17

## 2021-05-22 RX ADMIN — OXYCODONE 5 MG: 5 TABLET ORAL at 10:18

## 2021-05-22 RX ADMIN — LACTULOSE 20 G: 20 SOLUTION ORAL at 10:19

## 2021-05-22 RX ADMIN — PANTOPRAZOLE SODIUM 40 MG: 40 TABLET, DELAYED RELEASE ORAL at 10:17

## 2021-05-22 RX ADMIN — FOLIC ACID: 5 INJECTION, SOLUTION INTRAMUSCULAR; INTRAVENOUS; SUBCUTANEOUS at 09:27

## 2021-05-22 RX ADMIN — SPIRONOLACTONE 25 MG: 25 TABLET ORAL at 10:18

## 2021-05-22 RX ADMIN — PENTOXIFYLLINE 400 MG: 400 TABLET, EXTENDED RELEASE ORAL at 17:39

## 2021-05-22 RX ADMIN — ACETAMINOPHEN 650 MG: 325 TABLET ORAL at 01:17

## 2021-05-22 RX ADMIN — LACTULOSE 20 G: 20 SOLUTION ORAL at 14:17

## 2021-05-22 RX ADMIN — FUROSEMIDE 20 MG: 20 TABLET ORAL at 10:18

## 2021-05-22 ASSESSMENT — ENCOUNTER SYMPTOMS
ABDOMINAL PAIN: 1
DIARRHEA: 0
CHEST TIGHTNESS: 0
ABDOMINAL DISTENTION: 1
CONSTIPATION: 0
WHEEZING: 0
BLOOD IN STOOL: 1
EYE PAIN: 0
VOMITING: 0
COUGH: 0
NAUSEA: 0
SHORTNESS OF BREATH: 0

## 2021-05-22 ASSESSMENT — PAIN SCALES - GENERAL
PAINLEVEL_OUTOF10: 4
PAINLEVEL_OUTOF10: 7
PAINLEVEL_OUTOF10: 10

## 2021-05-22 NOTE — CONSULTS
INITIAL CONSULTATION     HISTORY OF PRESENT ILLNESS: Bela Butt is a 77 y.o. male with a past history remarkable for alcoholic liver disease, admitted to the hospital on 5/21/2021 for abdominal pain and jaundice. He was discharged from [facility] a day ago. There at the hospital he underwent EGD and colonoscopy for reported bleeding. No source of bleeding was noted. He has history of longstanding alcohol abuse. History of prior hepatitis C. He was drinking until his admission to [facility].  He is accompanied by his daughter. He is a smoker. History of drug abuse.      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    Cervical spine fracture (HCC)     C7, after fall, no surgery    COPD [facility] disease[facility]     Depression     H/O: substance abuse ([facility]     etoh,marijuana    Hep C w/o coma, chronic ([facility] 10/15/2014    Hepatitis C     History of suicidal tendencies     Hx: UTI (urinary tract infection)     Schizophrenia, schizo-affective (HCC)         Past Surgical History:   Procedure Laterality Date    CATARACT REMOVAL WITH IMPLANT      Left eye    COLONOSCOPY N/A 5/19/2021    COLONOSCOPY POLYPECTOMY SNARE/COLD BIOPSY performed by Fadumo Mcbride MD at [address] Left 8/27/13    repair of ruptured globe; vitrectomy    EYE SURGERY Left 9/24/13    enuculation    HERNIA REPAIR      inguinal    UMBILICAL HERNIA REPAIR      UPPER GASTROINTESTINAL ENDOSCOPY N/A 5/19/2021    EGD DIAGNOSTIC ONLY performed by Fadumo Mcbride MD at [address]      Leg vein    VITRECTOMY Left 08/27/2013    and globe repair          CURRENT MEDICATIONS:       Current Facility-Administered Medications:     folic acid 1 mg, thiamine (B-1) 100 mg in dextrose 5 % 50 mL IVPB, , Intravenous, Daily, PEEWEE Solis CNP, Stopped at 05/22/21 mononitrate tablet 100 mg, 100 mg, Oral, Daily, Audrene Modesto, APRN - CNP    0.9 % sodium chloride infusion, , Intravenous, Continuous, Kendal Calvo APRN - CNP, Last Rate: 50 mL/hr at 05/22/21 0345, Rate Change at 05/22/21 0345    sodium chloride flush 0.9 % injection 5-40 mL, 5-40 mL, Intravenous, 2 times per day, Audrene Modesto, APRN - CNP    sodium chloride flush 0.9 % injection 5-40 mL, 5-40 mL, Intravenous, PRN, Audrene Modesto, APRN - CNP    0.9 % sodium chloride infusion, 25 mL, Intravenous, PRN, Audrene Modesto, APRN - CNP    potassium chloride 10 mEq/100 mL IVPB (Peripheral Line), 10 mEq, Intravenous, PRN, Audrene Modesto, APRN - CNP    magnesium sulfate 1000 mg in dextrose 5% 100 mL IVPB, 1,000 mg, Intravenous, PRN, Audrene Modesto, APRN - CNP    acetaminophen (TYLENOL) tablet 650 mg, 650 mg, Oral, Q4H PRN, Audrene Modesto, APRN - CNP, 650 mg at 05/22/21 0117    promethazine (PHENERGAN) tablet 12.5 mg, 12.5 mg, Oral, Q6H PRN **OR** ondansetron (ZOFRAN) injection 4 mg, 4 mg, Intravenous, Q6H PRN, Audrene Modesto, APRN - CNP    albuterol (PROVENTIL) nebulizer solution 2.5 mg, 2.5 mg, Nebulization, As Directed RT PRN, Audrene Modesto, APRN - CNP       ALLERGIES:   Allergies   Allergen Reactions    Pcn [Penicillins] Swelling    Vicodin [Hydrocodone-Acetaminophen] Swelling    Motrin [Ibuprofen] Swelling          FAMILY HISTORY: The patient's family history was reviewed.         SOCIAL HISTORY:   Social History     Socioeconomic History    Marital status:      Spouse name: Not on file    Number of children: Not on file    Years of education: Not on file    Highest education level: Not on file   Occupational History    Not on file   Tobacco Use    Smoking status: Current Some Day Smoker     Packs/day: 3.00     Years: 50.00     Pack years: 150.00     Types: Cigarettes    Smokeless tobacco: Never Used   Vaping Use    Vaping Use: Never used   Substance and Sexual Activity    Alcohol use: Not Currently     Alcohol/week: 28.0 standard drinks     Types: 28 Cans of beer per week    Drug use: Yes     Types: Marijuana     Comment: occasional    Sexual activity: Yes   Other Topics Concern    Not on file   Social History Narrative    Not on file     Social Determinants of Health     Financial Resource Strain:     Difficulty of Paying Living Expenses:    Food Insecurity:     Worried About Running Out of Food in the Last Year:     Ran Out of Food in the Last Year:    Transportation Needs:     Lack of Transportation (Medical):  Lack of Transportation (Non-Medical):    Physical Activity:     Days of Exercise per Week:     Minutes of Exercise per Session:    Stress:     Feeling of Stress :    Social Connections:     Frequency of Communication with Friends and Family:     Frequency of Social Gatherings with Friends and Family:     Attends [employer] Services:     Active Member of Clubs or Organizations:     Attends Club or Organization Meetings:     Marital Status:    Intimate Partner Violence:     Fear of Current or Ex-Partner:     Emotionally Abused:     Physically Abused:     Sexually Abused:          REVIEW OF SYSTEMS: A 14-point review of systems was obtained and pertinent positives andnegatives were enumerated above in the history of present illness. All other reviewed systems / symptoms were negative. Review of Systems      PHYSICAL EXAMINATION: Vital signs reviewed per the nursing documentation. /79   Pulse 97   Temp 97.5 °F (36.4 °C) (Oral)   Resp 16   Ht 6' 1\" (1.854 m)   Wt 180 lb (81.6 kg)   SpO2 92%   BMI 23.75 kg/m²    [unfilled]   Body mass index is 23.75 kg/m². General:  A O x 3 in NAD   Psych: . Normal affect. Mentation normal   HEENT: PERRLA. Clear conjunctivae and sclerae. Moist oral mucosae, no lesions orulcers.    The neck is supple, without lymphadenopathy or jugular venous distension. No masses. Normal thyroid. Cardiovascular: S1 S2 RRR no rubs or murmurs. Pulmonary: clear BL. No accessory muscle usage. Abd Exam: Soft, NT ND, no hepato or spleno megaly, +BS, ++ ascites. No groin masses or lymphadenopathy. Extremities: No edema. Skin: Warm skin. No skin rash. No spider nevi palmar erythema naildystrophy. Joint: No joint swelling or deformity. Neurological: intact sensory. DTR+. No asterixis     LABORATORY DATA: Reviewed   Lab Results   Component Value Date    WBC 10.1 05/22/2021    HGB 14.0 05/22/2021    HCT 40.8 05/22/2021    MCV 97.4 05/22/2021    PLT 76 (L) 05/22/2021     (L) 05/22/2021    K 4.4 05/22/2021    CL 96 (L) 05/22/2021    CO2 20 05/22/2021    BUN 32 (H) 05/22/2021    CREATININE 0.97 05/22/2021    LABALBU 2.5 (L) 05/22/2021    BILITOT 19.63 (HH) 05/22/2021    ALKPHOS 195 (H) 05/22/2021     (H) 05/22/2021     (H) 05/22/2021    INR 3.7 05/22/2021      Lab Results   Component Value Date    RBC 3.91 (L) 05/22/2021    HGB 14.0 05/22/2021    MCV 97.4 05/22/2021    MCH 33.8 (H) 05/22/2021    MCHC 34.6 05/22/2021    RDW 16.3 (H) 05/22/2021    MPV 11.0 05/22/2021    BASOPCT 1 05/21/2021    LYMPHSABS 0.70 (L) 05/21/2021    MONOSABS 0.97 05/21/2021    NEUTROABS 6.40 05/21/2021    EOSABS 0.03 05/21/2021    BASOSABS 0.04 05/21/2021          DIAGNOSTIC TESTING:   XR CHEST PORTABLE    Result Date: 5/21/2021  EXAMINATION: ONE XRAY VIEW OF THE CHEST 5/21/2021 1:18 pm COMPARISON: 08/01/2013 HISTORY: ORDERING SYSTEM PROVIDED HISTORY: weak TECHNOLOGIST PROVIDED HISTORY: weak Reason for Exam: Pt c/o weakness. AP UPRIGHT PORTABLE Acuity: Acute Type of Exam: Initial FINDINGS: There are low lung volumes with bibasilar atelectasis. No pleural effusion or pneumothorax is demonstrated. The heart is normal in size. No acute osseous abnormality is seen. Low lung volumes with bibasilar atelectasis. The lungs are otherwise clear.      US ABDOMEN LIMITED Specify organ? LIVER    Result Date: 5/17/2021  EXAMINATION: RIGHT UPPER QUADRANT ULTRASOUND 5/17/2021 9:20 am COMPARISON: CT 05/16/2021 HISTORY: ORDERING SYSTEM PROVIDED HISTORY: to rule thromobosis, cirrhosis ? TECHNOLOGIST PROVIDED HISTORY: to rule thromobosis, cirrhosis ? Specify organ?->LIVER FINDINGS: LIVER:  Nodular cirrhotic liver. Liver length 14 cm. No focal liver lesion is demonstrated. A less than 5 mm hypodense liver lesion reported on the previous CT scan is not demonstrated on this exam.  MR imaging may be considered. BILIARY SYSTEM:  There is no intrahepatic biliary ductal dilatation. The common bile duct is not dilated. Measures 2-3 mm. RIGHT KIDNEY: The right kidney is grossly unremarkable without evidence of hydronephrosis. PANCREAS:  Visualized portions of the pancreas are unremarkable. OTHER: Color and spectral Doppler imaging was performed. Normal waveform patterns noted in the main portal as well as left right portal vein branches. Peak systolic velocities are low. Peak systolic velocity in the main portal vein is 7.9 cm/S. The hepatic veins also patent. No sonographic evidence for portal vein thrombosis. There is hepatic pedal flow in the portal vein. Large volume ascites. 1. Nodular cirrhotic liver. 2. Large volume ascites. Mild gallbladder wall thickening attributed to hypoproteinemic state from the cirrhosis and ascites. No cholelithiasis. 3. No sonographic evidence for portal vein thrombosis. There is normal directional flow in the portal vein. 4. The less than 5 mm hypodense lesion reported on prior CT scan is not sonographically apparent. Consider further imaging with MRI to definitively exclude the lesion.      US GUIDED PARACENTESIS    Result Date: 5/19/2021  PROCEDURE: ULTRASOUND GUIDED PARACENTESIS 5/19/2021 HISTORY: ORDERING SYSTEM PROVIDED HISTORY: Large volume ascites -request therapeutic paracentesis TECHNOLOGIST PROVIDED HISTORY: Large volume ascites -request therapeutic paracentesis Ascites; abdominal discomfort TECHNIQUE: This procedure was performed by Avery Tracey PA-C under indirect supervision of . Informed consent was obtained after a detailed explanation of the procedure including the risks, benefits, and alternatives. Universal protocol was followed. The area was prepped and draped in sterile fashion and local anesthesia was achieved with lidocaine. Pre-procedure ultrasound shows a dominant fluid pocket in the right upperquadrant. Using ultrasound guidance (image attached to the medical record), the fluid pocket was accessed with a 5 [employer] Yueh needle with aspiration of clear yellow fluid. [employer]ware vacuum machine was connected and paracentesis was performed and approximately 4250 mL was removed. Post procedural ultrasound demonstrated no residual fluid. A sample was not sent for laboratory analysis. Estimated blood loss was minimum. The patient tolerated the procedure well and left the department in good condition. FINDINGS: A total of 4250 mL clear yellow fluid was removed. Successful ultrasound guided paracentesis. CT CHEST ABDOMEN PELVIS W CONTRAST    Result Date: 5/16/2021  EXAMINATION: CT OF THE CHEST, ABDOMEN, AND PELVIS WITH CONTRAST 5/16/2021 12:34 pm TECHNIQUE: CT of the chest, abdomen and pelvis was performed with the administration of intravenous contrast. Multiplanar reformatted images are provided for review. Dose modulation, iterative reconstruction, and/or weight based adjustment of the mA/kV was utilized to reduce the radiation dose to as low as reasonably achievable.  COMPARISON: 05/05/2019 HISTORY: ORDERING SYSTEM PROVIDED HISTORY: new sob, evidence of liver failure, rectal bleeding abdominal pain, bloating TECHNOLOGIST PROVIDED HISTORY: new sob, evidence of liver failure, rectal bleeding abdominal pain, bloating Decision Support Exception - unselect if not a suspected or confirmed emergency medical condition->Emergency Medical Condition (MA) Reason for Exam: bloated, abdominal pain , nausea Acuity: Unknown Type of Exam: Unknown FINDINGS: Chest: Mediastinum: The heart and great vessels are normal in size. Calcified atheromatous plaque and coronary calcifications are noted. No pericardial effusion. No enlarged or suspicious-appearing lymph nodes are identified. Lungs/pleura: Paraseptal and centrilobular emphysematous disease. Subsegmental atelectasis or scarring in the lung bases. No consolidation. No evidence for edema. No effusion. The central airway is patent. Soft Tissues/Bones: No acute findings. Abdomen/Pelvis: Organs: The liver is small with morphologic findings of cirrhosis. Tiny less than 5 mm hypoattenuating liver lesion in the dome of segment 4 is too small to characterize. This is not clearly demonstrated on the prior exam, which may have been due to the absence of contrast.  The spleen is normal in size. The portal venous system is patent. The gallbladder and biliary tree reveal no acute findings. The pancreas, adrenals and kidneys reveal no acute findings. GI/Bowel: No evidence for bowel obstruction. Edematous appearance of the proximal colon is noted, which may be related to portal hypertensive colopathy. Normal appendix. Pelvis: No acute findings. Peritoneum/Retroperitoneum: Moderate volume abdominopelvic ascites. No loculated fluid collection. No free air. The aorta is normal in caliber. Bones/Soft Tissues: No acute osseous abnormality identified. Advanced disc disease at L5-S1.     1.  Emphysema and subsegmental atelectasis or scarring in the lung bases. 2.  Morphologic findings of cirrhosis are demonstrated. Less than 5 mm hypoattenuating liver lesion in the central left hepatic lobe is too small to characterize. Attention to on follow-up is recommended. 3.  Moderate volume abdominopelvic ascites.  4.  Edematous appearance of the proximal colon, favored to represent portal hypertensive colopathy versus inflammatory colitis. IMPRESSION: Mr. Sasha Shah is a 77 y.o. male with alcoholic liver disease with ascites. Acute alcoholic hepatitis. Maddrey Discriminant factor around 120. Very high expected mortality rate. We had extensive discussion with the patient and his daughter in regards to his condition and potential prognosis. Steroids may be associated with very high risk for infection given very high Maddrey Discriminant factor. We will start the doxycycline. We encouraged the patient to eat 3 meals per day in addition to 3 cans of Ensure per day. In case he is not able to keep up with high calorie intake we may need to put NG tube for nutrition. High expected mortality rate. Thank you for allowing me to participate in the care of Mr. Sasha Shah. For any further questions please do not hesitate to contact me.        Enis Grieve, MD [facility]

## 2021-05-22 NOTE — ACP (ADVANCE CARE PLANNING)
Advance Care Planning     Advance Care Planning Activator (Inpatient)  Conversation Note      Date of ACP Conversation: 5/22/2021     Conversation Conducted with: Patient with Decision Making Capacity and dtr Bhavana Dang     ACP Activator: Chrissy Oswald RN        Health Care Decision Maker: self    Current Designated Health Care Decision Maker: self/children 2 biological    Click here to complete Healthcare Decision Makers including section of the Healthcare Decision Maker Relationship (ie \"Primary\")  [facility] The patient is considering options. Care Preferences    Ventilation: \"If you were in your present state of health and suddenly became very ill and were unable to breathe on your own, what would your preference be about the use of a ventilator (breathing machine) if it were available to you? \"      Would the patient desire the use of ventilator (breathing machine)?: yes    \"If your health worsens and it becomes clear that your chance of recovery is unlikely, what would your preference be about the use of a ventilator (breathing machine) if it were available to you? \"     Would the patient desire the use of ventilator (breathing machine)?: Yes      Resuscitation  \"CPR works best to restart the heart when there is a sudden event, like a heart attack, in someone who is otherwise healthy. Unfortunately, CPR does not typically restart the heart for people who have serious health conditions or who are very sick. \"    \"In the event your heart stopped as a result of an underlying serious health condition, would you want attempts to be made to restart your heart (answer \"yes\" for attempt to resuscitate) or would you prefer a natural death (answer \"no\" for do not attempt to resuscitate)? \" yes       [] Yes   [] No   Educated Patient / Evan Le regarding differences between Advance Directives and portable DNR orders.     Length of ACP Conversation in minutes:  20    Conversation

## 2021-05-22 NOTE — FLOWSHEET NOTE
is called through [employer] to assist patient with Advanced Directives. Patient states he will read it over and have his daughter assist with filling out paper work. Patient states about his medical condition.  shared in presence, prayers. Follow up as needed. 05/22/21 1544   Encounter Summary   Services provided to: Patient   Referral/Consult From: Nurse; Other    Support System Children   Continue Visiting   (5-22-21)   Complexity of Encounter Low   Length of Encounter 15 minutes   Spiritual Assessment Completed Yes   Advance Care Planning Yes   Routine   Type Initial   Assessment Passive   Intervention Explored feelings, thoughts, concerns;Prayer;Discussed illness/injury and it's impact; Discussed belief system/Episcopal practices/crystal;Sustaining presence/ Ministry of presence   Outcome Expressed gratitude;Receptive   Advance Directives (For Healthcare)   Healthcare Directive No, patient does not have an advance directive for healthcare treatment   Patient Requests Assistance Yes, referral made to    Advance Directives Documents given;Documents explained

## 2021-05-22 NOTE — DISCHARGE INSTR - COC
Continuity of Care Form    Patient Name: Julisa Vogel   :  1955  MRN:  [id number]    Admit date:  2021  Discharge date:  ***    Code Status Order: Full Code   Advance Directives:   Advance Care Flowsheet Documentation     Date/Time Healthcare Directive Type of Healthcare Directive Copy in [address] Agent's Name Healthcare Agent's Phone Number    21 1119  No, patient does not have an advance directive for healthcare treatment -- -- -- -- --          Admitting Physician:  No admitting provider for patient encounter. PCP: PEEWEE Andino - CNP    Discharging Nurse: [facility] Unit/Room#: 1011/1011-01  Discharging Unit Phone Number: ***    Emergency Contact:   Extended Emergency Contact Information  Primary Emergency Contact: Huma Mendoza  Address: NOT AVAILABLE  Home Phone: [phone]  Relation: Child  Secondary Emergency Contact: [address] [place] [address] Phone: [phone]  Mobile Phone: [phone]  Relation: Child    Past Surgical History:  Past Surgical History:   Procedure Laterality Date    CATARACT REMOVAL WITH IMPLANT      Left eye    COLONOSCOPY N/A 2021    COLONOSCOPY POLYPECTOMY SNARE/COLD BIOPSY performed by Anayeli Yang MD at  [address] Left [address]    repair of ruptured globe; vitrectomy    EYE SURGERY Left 13    enuculation    HERNIA REPAIR      inguinal    UMBILICAL HERNIA REPAIR      UPPER GASTROINTESTINAL ENDOSCOPY N/A 2021    EGD DIAGNOSTIC ONLY performed by Anayeli Yang MD at  [employer]      Leg vein    VITRECTOMY Left 2013    and globe repair       Immunization History: There is no immunization history for the selected administration types on file for this patient.     Active Problems:  Patient Active Problem List   Diagnosis Code    COPD (chronic obstructive pulmonary disease) ([facility] [address] J44.9    Chronic hepatitis C without hepatic coma ([facility] [address] B18.2    Marijuana abuse F12.10    S/P cervical spinal fusion M78.2    Alcoholic liver failure (HCC) K70.40    Rectal bleed K62.5    Thrombocytopenia (HCC) D69.6    Abdominal ascites R18.8    Pedal edema R60.0    Lactic acidosis Q45.3    Alcoholic cirrhosis of liver with ascites (HCC) K70.31    Jaundice R17    Abnormal INR R79.1    Hyponatremia E87.1    Hyperbilirubinemia E80.6    Acute liver failure without hepatic coma K72.00       Isolation/Infection:   Isolation          No Isolation        Patient Infection Status     None to display          Nurse Assessment:  Last Vital Signs: /71   Pulse 90   Temp 97.4 °F (36.3 °C) (Oral)   Resp 16   Ht 6' 1\" (1.854 m)   Wt 180 lb (81.6 kg)   SpO2 96%   BMI 23.75 kg/m²     Last documented pain score (0-10 scale): Pain Level: 4  Last Weight:   Wt Readings from Last 1 Encounters:   05/21/21 180 lb (81.6 kg)     Mental Status:  {IP PT MENTAL STATUS:20030}    IV Access:  {[facility] IV ACCESS:899402631}    Nursing Mobility/ADLs:  Walking   {P DME XHIK:126837949}  Transfer  {P DME MYLP:557042396}  Bathing  {[facility] DME BBBJ:641012842}  Dressing  {P DME HCTY:890982845}  Toileting  {P DME RVXJ:849108675}  Feeding  {[facility] DME EQPA:266097098}  Med Admin  {[facility] DME NVPL:459970694}  Med Delivery   {[facility] MED Delivery:935715091}    Wound Care Documentation and Therapy:        Elimination:  Continence:   · Bowel: {YES / BQ:43834}  · Bladder: {YES / BB:34646}  Urinary Catheter: {Urinary Catheter:045363992}   Colostomy/Ileostomy/Ileal Conduit: {YES / CT:11098}       Date of Last BM: ***  No intake or output data in the 24 hours ending 05/22/21 1424  No intake/output data recorded.     Safety Concerns:     [address] A[facility] Safety Concerns:474109836}    Impairments/Disabilities:      [address] A[facility] Impairments/Disabilities:550721599}    Nutrition Therapy:  Current Nutrition Therapy:   [facility] A[facility] Diet List:429075215}    Routes of Feeding: {CHP DME Other Feedings:312453888}  Liquids: {Slp liquid thickness:67748}  Daily Fluid Restriction: {CHP DME Yes amt example:547121867}  Last Modified Barium Swallow with Video (Video Swallowing Test): {Done Not Done [facility]:932277834}    Treatments at the Time of Hospital Discharge:   Respiratory Treatments: ***  Oxygen Therapy:  {Therapy; copd oxygen:18621}  Ventilator:    {MH CC Vent NRJP:263948312}    Rehab Therapies: Physical Therapy and Occupational Therapy  Weight Bearing Status/Restrictions: No weight bearing restirctions  Other Medical Equipment (for information only, NOT a DME order):  walker  Other Treatments:   SN  Medication teaching  HHA     Patient's personal belongings (please select all that are sent with patient):  {CHP DME Belongings:473619652}    RN SIGNATURE:  {Esignature:578093884}    CASE MANAGEMENT/SOCIAL WORK SECTION    Inpatient Status Date: ***    Readmission Risk Assessment Score:  Readmission Risk              Risk of Unplanned Readmission:  26           Discharging to Facility/ Agency   · Name: [address]  · Address: [address] [place] [address]  · Phone: [phone]  · Fax:[phone]      / signature: Electronically signed by Arpita Meyer RN on 5/22/21 at 2:24 PM EDT    PHYSICIAN SECTION    Prognosis: {Prognosis:6898353091}    Condition at Discharge: [address]anton Patient Condition:010024050}    Rehab Potential (if transferring to Rehab): {Prognosis:1995232465}    Recommended Labs or Other Treatments After Discharge: ***    Physician Certification: I certify the above information and transfer of Wolfgang Ruiz  is necessary for the continuing treatment of the diagnosis listed and that he requires {Admit to Appropriate Level of Care:21355} for {GREATER/LESS:273077267} 30 days.      Update Admission H&P: {CHP DME Changes in ENSRD:228953654}    PHYSICIAN SIGNATURE:  {Esignature:240010955}

## 2021-05-22 NOTE — PROGRESS NOTES
Pt transferred from ER via cart, pt place on telemetry, assessment and vitals complete, pt oriented to room and bed controls, pt denies any pain, no needs voiced, will continue to monitor

## 2021-05-22 NOTE — PROGRESS NOTES
Transitions of Care Pharmacy Service   Medication Review    The patient's list of current home medications has been reviewed. Source(s) of information: [address] [place] [address]     Based on information provided by the above source(s), no changes to the patient's home medication list were necessary. Please feel free to call me with any questions about this encounter. Thank you. Loree Fothergill, [facility]   Transitions of Care Pharmacy Service  Phone:  [phone]  Fax: [phone]      Electronically signed by Loree Fothergill, [facility] on 5/22/2021 at 5:01 PM       Medications Prior to Admission: pantoprazole (PROTONIX) 40 MG tablet, Take 1 tablet by mouth every morning (before breakfast)  vitamin B-1 (THIAMINE) 100 MG tablet, Take 1 tablet by mouth daily  Multiple Vitamin (MULTIVITAMIN) TABS tablet, Take 1 tablet by mouth daily  furosemide (LASIX) 20 MG tablet, Take 1 tablet by mouth daily  spironolactone (ALDACTONE) 25 MG tablet, Take 1 tablet by mouth daily  folic acid (FOLVITE) 1 MG tablet, Take 1 tablet by mouth daily  lactulose (CHRONULAC) 10 GM/15ML solution, Take 30 mLs by mouth 3 times daily  albuterol sulfate HFA (PROVENTIL HFA) 108 (90 Base) MCG/ACT inhaler, Inhale 2 puffs into the lungs every 6 hours as needed for Wheezing  mometasone-formoterol (DULERA) 100-5 MCG/ACT inhaler, Inhale 2 puffs into the lungs 2 times daily  oxyCODONE (ROXICODONE) 5 MG immediate release tablet, Take 1 tablet by mouth every 8 hours as needed for Pain for up to 3 days. Intended supply: 5 days.  Take lowest dose possible to manage pain  nicotine (NICODERM CQ) 21 MG/24HR, Place 1 patch onto the skin every 24 hours

## 2021-05-22 NOTE — CARE COORDINATION
DC planning    Spoke with Cami from [facility] confirmed they were supposed  open his case yesterday but he was hospitalized. Will need VICK/orders at [facility].

## 2021-05-22 NOTE — CARE COORDINATION
Case Management Initial Discharge Plan  Ba Page,         Readmission Risk              Risk of Unplanned Readmission:  32             Met with:family member patient and daughter Richie Gitelman to discuss discharge plans. Information verified: address, contacts, phone number, , insurance Yes  PCP: PEEWEE Ty - CNP  Date of last visit: unsure    Insurance Provider: [employer] [employer]     Discharge Planning  Current Residence:  Home with dtr Harley Light   Living Arrangements:    just moved in with dtr after previous hospital admit  Home has 3 stories/20+ stairs to climb to 3rd floor apt  Support Systems:     Current Services PTA:  vns Agency: [address] [place] [address]  Patient able to perform ADL's:Assisted  DME in home:  None n  DME used to aid ambulation prior to admission:   na  DME used during admission:  TBD    Potential Assistance Needed:       Pharmacy: [facility] Medications:     Does patient want to participate in local refill/ meds to beds program?  No    Patient agreeable to home care: Yes  Freedom of choice provided:  yes      Type of Home Care Services:     Patient expects to be discharged to:       Prior SNF/Rehab Placement and Facility: no  Agreeable to SNF/Rehab: No  [place] of choice provided: n/a   Evaluation: n/a    Expected Discharge date: Follow Up Appointment: Best Day/ Time:      Transportation provider: kaylee   Transportation arrangements needed for discharge: No    Discharge Plan: Pt is from home with dtr-needs assist with ADLS. Needs walker, commode, and shower chair. Current with [facility] have not started service yet. Readmit from [facility] liver failure-rectal bleed SP EGD/Colon    Now admitted with abd pain jaundice     Pt has just moved in with dtr Richie Gitelman on -he lived alone previously in apt and alcoholic for many years. Dtr is HHA for [address] [place] [address] and will be helping care for her pt.      [facility] has not started care yet for pt as he was admitted after 1 day out of [facility]. She prefers to take him home with vns rather than snf. Need PT/OT eval.    Dtr relays he does need walker, commode and shower chair(ok with [facility])  will leave note for attending-pt needs assistance with ADLS besides eating. She is able to provide transport for now but will look into medical cab if provided by insurance. Called and LM with [facility] to confirm services.        Bili 19.63  PT/OT  GI consult  Paracentesis  NS IV @ 50  CIWA      Electronically signed by Agustina Chávez RN on 5/22/21 at 2:03 PM EDT

## 2021-05-22 NOTE — H&P
also states that his urine is darker than normal. He denies nausea, vomiting, fever or chills. He does have difficulty with historical data. He was recently hospitalized 5/16/2021 and found to be in liver failure secondary to alcoholic cirrhosis. He underwent a paracentesis 5/19/2021, 4,250ml of clear yellow fluid was removed. The patient states that he previously drank up to 1/5 of liquor a day and 7 malt liquor drinks. He states his last acholic drink was approximately 3 weeks ago. He is a smoker and has recently quit, he has not smoked cigarettes in 4-5 days. He has past medical history that includes hepatitis C, COPD and schizophrenia. He has a prosthetic left eye. I discussed the possible need for a blood transfusion with the patient including indications, risks, benefits and possibility of transfusion reaction. He is agreeable to receiving a blood transfusion if needed. Sodium 129, alk phos 197, , ammonia 27, , bilirubin 18.39. HBG 13.6 HCT 39.2, platelets 70, PT 88.5, INR 3.4, PTT 51.1. Colonoscopy 5/19/2021  Findings:   1. Normal terminal ileum  2.  5 mm sessile polyp in the sigmoid colon resected with cold snare. Resection and retrieval was complete. This area was clipped with 2 metal resolution clips. 3.  Small rectal varices. 4.  Grade 2 internal hemorrhoids on retroflexion. 5.  Colon exam was otherwise normal    EGD 5/19/2021  Findings[de-identified]   Esophagus: Small esophageal varices. Esophagus otherwise appeared normal.  Stomach: Moderate amount of portal hypertensive gastropathy in the fundus and body of the stomach. The stomach was otherwise normal.  Duodenum: The duodenum appeared to have multiple duodenal varices in the second portion of the duodenum without any active bleeding.   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 Intended supply: 5 days. Take lowest dose possible to manage pain 5/20/21 5/23/21  Juan Frias MD   nicotine (NICODERM CQ) 21 MG/24HR Place 1 patch onto the skin every 24 hours 5/20/21 7/19/21  Juan Frias MD        Allergies:     Pcn [penicillins], Vicodin [hydrocodone-acetaminophen], and Motrin [ibuprofen]    Social History:     Tobacco:    reports that he has been smoking cigarettes. He has a 150.00 pack-year smoking history. He has never used smokeless tobacco.  Alcohol:      reports previous alcohol use of about 28.0 standard drinks of alcohol per week. Drug Use:  reports current drug use. Drug: Marijuana. Family History:     Family History   Problem Relation Age of Onset    Heart Disease Father      Review of Systems:     Positive and Negative as described in HPI. Review of Systems   Constitutional: Negative for chills, diaphoresis and fever. HENT: Negative for congestion. Eyes: Negative for pain and visual disturbance. Respiratory: Negative for cough, chest tightness, shortness of breath and wheezing. Cardiovascular: Negative for chest pain, palpitations and leg swelling. Gastrointestinal: Positive for abdominal distention, abdominal pain and blood in stool. Negative for constipation, diarrhea, nausea and vomiting. Endocrine: Negative for cold intolerance and heat intolerance. Genitourinary: Negative for difficulty urinating, dysuria, frequency and urgency. Musculoskeletal: Negative for arthralgias and myalgias. Skin: Negative for pallor and rash. Neurological: Negative for dizziness, weakness, light-headedness, numbness and headaches. Hematological: Does not bruise/bleed easily. Psychiatric/Behavioral: The patient is not nervous/anxious. All other systems reviewed and are negative. Physical Exam:   /70   Pulse 79   Temp 98.3 °F (36.8 °C) (Oral)   Resp 13   Ht 6' 1\" (1.854 m)   Wt 180 lb (81.6 kg)   SpO2 95%   BMI 23.75 kg/m²   Temp (24hrs), Av.3 °F (36.8 °C), Min:98.3 °F (36.8 °C), Max:98.3 °F (36.8 °C)    No results for input(s): POCGLU in the last 72 hours. No intake or output data in the 24 hours ending 21 1393    Physical Exam  Vitals and nursing note reviewed. Constitutional:       Appearance: He is not diaphoretic. HENT:      Head: Normocephalic and atraumatic. Right Ear: Hearing normal.      Left Ear: Hearing normal.      Nose: Nose normal. No rhinorrhea. Eyes:      General: Lids are normal. Scleral icterus present. Extraocular Movements:      Right eye: Normal extraocular motion. Conjunctiva/sclera: Conjunctivae normal.      Right eye: Right conjunctiva is not injected. Comments: Right eye PERRL. prosthetic left eye. Neck:      Thyroid: No thyromegaly. Trachea: Trachea normal. No tracheal deviation. Cardiovascular:      Rate and Rhythm: Normal rate and regular rhythm. Pulses: Normal pulses. Heart sounds: Normal heart sounds. No murmur heard. Pulmonary:      Effort: Pulmonary effort is normal.      Breath sounds: No stridor. Examination of the right-lower field reveals decreased breath sounds. Examination of the left-lower field reveals decreased breath sounds. Decreased breath sounds present. Abdominal:      General: Bowel sounds are normal. There is distension. Tenderness: There is generalized abdominal tenderness. There is guarding. Musculoskeletal:         General: No tenderness. Cervical back: Neck supple. Right lower le+ Edema present. Left lower le+ Edema present. Skin:     General: Skin is warm and dry. g/dL    Albumin 2.4 (L) 3.5 - 5.2 g/dL    Albumin/Globulin Ratio NOT REPORTED 1.0 - 2.5    GFR Non-African American >60 >60 mL/min    GFR African American >60 >60 mL/min    GFR Comment          GFR Staging NOT REPORTED    TROP/MYOGLOBIN    Collection Time: 05/21/21  1:48 PM   Result Value Ref Range    Troponin, High Sensitivity 18 0 - 22 ng/L    Troponin T NOT REPORTED <0.03 ng/mL    Troponin Interp NOT REPORTED     Myoglobin 128 (H) 28 - 72 ng/mL   Protime-INR    Collection Time: 05/21/21  1:48 PM   Result Value Ref Range    Protime 33.4 (H) 11.5 - 14.2 sec    INR 3.4    APTT    Collection Time: 05/21/21  1:48 PM   Result Value Ref Range    PTT 51.1 (H) 23.9 - 33.8 sec   TROP/MYOGLOBIN    Collection Time: 05/21/21  3:15 PM   Result Value Ref Range    Troponin, High Sensitivity 16 0 - 22 ng/L    Troponin T NOT REPORTED <0.03 ng/mL    Troponin Interp NOT REPORTED     Myoglobin 116 (H) 28 - 72 ng/mL   TROP/MYOGLOBIN    Collection Time: 05/21/21  5:15 PM   Result Value Ref Range    Troponin, High Sensitivity 15 0 - 22 ng/L    Troponin T NOT REPORTED <0.03 ng/mL    Troponin Interp NOT REPORTED     Myoglobin 100 (H) 28 - 72 ng/mL   Ammonia    Collection Time: 05/21/21  6:23 PM   Result Value Ref Range    Ammonia 27 16 - 60 umol/L       Imaging/Diagnostics:  XR CHEST PORTABLE    Result Date: 5/21/2021  Low lung volumes with bibasilar atelectasis. The lungs are otherwise clear. US ABDOMEN LIMITED Specify organ? LIVER    Result Date: 5/17/2021  1. Nodular cirrhotic liver. 2. Large volume ascites. Mild gallbladder wall thickening attributed to hypoproteinemic state from the cirrhosis and ascites. No cholelithiasis. 3. No sonographic evidence for portal vein thrombosis. There is normal directional flow in the portal vein. 4. The less than 5 mm hypodense lesion reported on prior CT scan is not sonographically apparent. Consider further imaging with MRI to definitively exclude the lesion.      US GUIDED PARACENTESIS    Result Date: 5/19/2021  Successful ultrasound guided paracentesis. Pre-operative Diagnosis:  Alcoholic cirrhosis w/ ascites                         Post-operative Diagnosis: Same     Procedure: Ultrasound guided Paracentesis       Anesthesia: 1% Lidocaine           Surgeons/Assistants: MINO Nunn      Complications: none     EBL: Minimal     Specimens: were obtained     Ultrasound guided paracentesis performed. 4250 ml clear yellow fluid obtained. Dressing applied. Electronically signed by MINO Espinoza on 5/19/2021 at 1:29 PM      CT CHEST ABDOMEN PELVIS W CONTRAST    Result Date: 5/16/2021  1. Emphysema and subsegmental atelectasis or scarring in the lung bases. 2.  Morphologic findings of cirrhosis are demonstrated. Less than 5 mm hypoattenuating liver lesion in the central left hepatic lobe is too small to characterize. Attention to on follow-up is recommended. 3.  Moderate volume abdominopelvic ascites. 4.  Edematous appearance of the proximal colon, favored to represent portal hypertensive colopathy versus inflammatory colitis. Assessment :      Hospital Problems         Last Modified POA    * (Principal) Alcoholic liver failure ([address] 5/21/2021 Yes    COPD (chronic obstructive pulmonary disease) ([address] 5/21/2021 Yes    Chronic hepatitis C without hepatic coma ([address] 5/21/2021 Yes    Thrombocytopenia ([address] 5/21/2021 Yes    Abnormal INR 5/21/2021 Yes    Hyponatremia 5/21/2021 Yes        Plan:     Patient status inpatient in the  Progressive Unit/Step down unit. 1. Consult GI, appreciate recommendations. 2. Patient likely requires paracentesis, consult IR. 3. Elevated INR- monitor coags. 4. Hyponatremia- check random urine sodium, urine osmolality, serial electrolytes, avoid overcorrection. 5. Normal saline at 50ml/hr. 6. GI bleed- serial H&H, patient is amicable to blood transfusion if needed. 7. Thrombocytopenia- monitor for further platelet decrease.    8. COPD- symbicort. 9. Hx of alcohol abuse- thiamine and folate supplementation. 10. Continue with alcohol and tobacco cessation. 11. Check UA. 12. DVT prophylaxis with EPC cuffs. 13. Monitor vital signs. 14. Follow chemistries. 15. Replete electrolytes as needed. 16. NPO for now. 17. Activity as tolerated with assist.    Plan of care discussed with patient. Consultations:   IP CONSULT TO GI  IP CONSULT TO HOSPITALIST    Patient is admitted as inpatient status because of co-morbidities listed above, severity of signs and symptoms as outlined, requirement for current medical therapies and most importantly because of direct risk to patient if care not provided in a hospital setting. Expected length of stay > 48 hours.     PEEWEE Maldonado CNP  5/21/2021  10:56 PM    Copy sent to PEEWEE Jauregui - CNP

## 2021-05-22 NOTE — PROGRESS NOTES
[facility]  Office: [address], DO, Justin Pichardo, DO, Bogdan Calvillo, DO, Jimmie Eric, DO, Brittani Doshi MD, Vance Arevalo MD, Lisa Patrick MD, Tosha Rhodes MD, Lo Chowdhury MD, Scooby Duncan MD, Martin Maza MD, Stewart Agrawal MD, Annamarie Issa, DO, Carol Hebert MD, Tae Little DO, Isrrael Modi MD,  Allan Velazquez, DO, Tejal Maria MD, Neal Cervantes MD, Page David MD, Jacklyn Patel MD, Monserrat [facility], [address], [facility], Aaron Ayala, CNP, Kay Paez, CNS, Coleman Fernandes, CNP, Thompson Lombard, CNP, Eleazar Amaya, CNP, Krzysztof Rai, CNP, Dayami Wright, CNP, Chepe Grimm PA-C, Christiano Macias, [place], Ehsan Nixon, CNP, Matt Funk, CNP, Miranda Bose, CNP, Tammie Mcnamara, CNP, Anselmo Mazariegos, CNP, Marbin Richardson, [facility]    Progress Note    5/22/2021    9:20 AM    Name:   Joselyn Pisano  MRN:     [id number]     Acct:      [de-identified]   Room:   [address] [place] [address] Day:  1  Admit Date:  5/21/2021 12:50 PM    PCP:   PEEWEE Donohue CNP  Code Status:  Full Code    Subjective:     C/C:   Chief Complaint   Patient presents with    Abdominal Pain    Other     jaundice      Interval History Status: not changed. Pt seen and examined this morning  No acute events overnight          Review of Systems:     12 point ROS performed and negative for anything other than what was stated in subjective     Medications: Allergies:     Allergies   Allergen Reactions    Pcn [Penicillins] Swelling    Vicodin [Hydrocodone-Acetaminophen] Swelling    Motrin [Ibuprofen] Swelling       Current Meds:   Scheduled Meds:    thiamine and folic acid IVPB   Intravenous Daily    [Held by provider] folic acid  1 mg Oral Daily    furosemide  20 mg Oral Daily    lactulose  20 g Oral TID    budesonide-formoterol  2 puff Inhalation BID    multivitamin  1 tablet Oral Daily    nicotine  1 patch Transdermal Q24H    pantoprazole  40 mg Oral QAM AC    spironolactone  25 mg Oral Daily    [Held by provider] vitamin B-1  100 mg Oral Daily    sodium chloride flush  5-40 mL Intravenous 2 times per day    [Held by provider] enoxaparin  40 mg Subcutaneous Daily     Continuous Infusions:    sodium chloride 50 mL/hr at 21 0345    sodium chloride       PRN Meds: oxyCODONE, sodium chloride flush, sodium chloride, potassium chloride, magnesium sulfate, acetaminophen, promethazine **OR** ondansetron, albuterol    Data:     Past Medical History:   has a past medical history of Anxiety, Asthma, Blind left eye, Cervical spine fracture ([facility] COPD (chronic obstructive pulmonary disease) ([facility] Depression, H/O: substance abuse ([facility] Hep C w/o coma, chronic ([facility] Hepatitis C, History of suicidal tendencies, Hx: UTI (urinary tract infection), and Schizophrenia, schizo-affective ([facility] Social History:   reports that he has been smoking cigarettes. He has a 150.00 pack-year smoking history. He has never used smokeless tobacco. He reports previous alcohol use of about 28.0 standard drinks of alcohol per week. He reports current drug use. Drug: Marijuana. Family History:   Family History   Problem Relation Age of Onset    Heart Disease Father        Vitals:  /75   Pulse 87   Temp 97.5 °F (36.4 °C) (Oral)   Resp 16   Ht 6' 1\" (1.854 m)   Wt 180 lb (81.6 kg)   SpO2 97%   BMI 23.75 kg/m²   Temp (24hrs), Av.9 °F (36.6 °C), Min:97.5 °F (36.4 °C), Max:98.3 °F (36.8 °C)    No results for input(s): POCGLU in the last 72 hours. I/O (24Hr):   No intake or output data in the 24 hours ending 21 0920    Labs:  Hematology:  Recent Labs     21  1147 21  0553 21  1348 21  0459   WBC  --  7.4 8.3 10.1   RBC  --  3.66* 4.02* 3.91*   HGB  --  12.2* 13.6 13.2   HCT  --  35.3* 39.2* 38.1*   MCV  --  96.4 97.5 97.4   MCH  --  33.3 33.8* 33.8*   MCHC  --  34.6 34.7 34.6   RDW  -- 16.6* 16.3* 16.3*   PLT  --  See Reflexed IPF Result 70* 76*   MPV  --  NOT REPORTED 10.0 11.0   INR 2.5  --  3.4 3.7     Chemistry:  Recent Labs     05/20/21  0553 05/21/21  1348 05/21/21  1515 05/21/21  1715 05/22/21  0459    129*  --   --  129*   K 3.8 3.7  --   --  3.9    97*  --   --  96*   CO2 21 21  --   --  20   GLUCOSE 83 124*  --   --  87   BUN 23 27*  --   --  32*   CREATININE 0.43* 0.77  --   --  0.97   MG  --   --   --   --  2.2   ANIONGAP 12 11  --   --  13   LABGLOM >60 >60  --   --  >60   GFRAA >60 >60  --   --  >60   CALCIUM 8.0* 9.1  --   --  9.0   PHOS  --   --   --   --  4.4   TROPHS  --  18 16 15  --    MYOGLOBIN  --  128* 116* 100*  --      Recent Labs     05/20/21  0553 05/21/21  1348 05/21/21  1823 05/22/21  0459   PROT 5.3* 6.1*  --  6.0*   LABALBU 2.2* 2.4*  --  2.5*   * 224*  --  219*   * 169*  --  168*   ALKPHOS 148* 197*  --  195*   BILITOT 15.87* 18.39*  --  19.63*   AMMONIA  --   --  27  --    AMYLASE  --   --   --  89   LIPASE  --   --   --  35     ABG:No results found for: POCPH, PHART, PH, POCPCO2, DYN4CNZ, PCO2, POCPO2, PO2ART, PO2, POCHCO3, BSW2PQO, HCO3, NBEA, PBEA, BEART, BE, THGBART, THB, VJC6DXK, KDOE9SKD, A1KQTJEL, O2SAT, FIO2  Lab Results   Component Value Date/Time    SPECIAL NOT REPORTED 05/16/2021 03:00 PM     Lab Results   Component Value Date/Time    CULTURE NO GROWTH 5 DAYS 05/16/2021 03:00 PM       Radiology:  XR CHEST PORTABLE    Result Date: 5/21/2021  Low lung volumes with bibasilar atelectasis. The lungs are otherwise clear. US ABDOMEN LIMITED Specify organ? LIVER    Result Date: 5/17/2021  1. Nodular cirrhotic liver. 2. Large volume ascites. Mild gallbladder wall thickening attributed to hypoproteinemic state from the cirrhosis and ascites. No cholelithiasis. 3. No sonographic evidence for portal vein thrombosis. There is normal directional flow in the portal vein.  4. The less than 5 mm hypodense lesion reported on prior CT scan

## 2021-05-22 NOTE — PROGRESS NOTES
[facility], PPatient Assessment complete. Abdominal pain [R10.9] . Vitals:    05/22/21 0241   BP: 107/66   Pulse: 90   Resp:    Temp:    SpO2: 95%   . Patients home meds are   Prior to Admission medications    Medication Sig Start Date End Date Taking? Authorizing Provider   pantoprazole (PROTONIX) 40 MG tablet Take 1 tablet by mouth every morning (before breakfast) 5/21/21   Rosibel Naylor MD   vitamin B-1 (THIAMINE) 100 MG tablet Take 1 tablet by mouth daily 5/20/21   Rosibel Naylor MD   Multiple Vitamin (MULTIVITAMIN) TABS tablet Take 1 tablet by mouth daily 5/20/21   Rosibel Naylor MD   furosemide (LASIX) 20 MG tablet Take 1 tablet by mouth daily 5/20/21   Rosibel Naylor MD   spironolactone (ALDACTONE) 25 MG tablet Take 1 tablet by mouth daily 5/20/21   Rosibel Naylor MD   folic acid (FOLVITE) 1 MG tablet Take 1 tablet by mouth daily 5/20/21   Rosibel Naylor MD   lactulose (CHRONULAC) 10 GM/15ML solution Take 30 mLs by mouth 3 times daily 5/20/21   Rosibel Naylor MD   albuterol sulfate HFA (PROVENTIL HFA) 108 (90 Base) MCG/ACT inhaler Inhale 2 puffs into the lungs every 6 hours as needed for Wheezing 5/20/21   Rosibel Naylor MD   mometasone-formoterol (DULERA) 100-5 MCG/ACT inhaler Inhale 2 puffs into the lungs 2 times daily 5/20/21   Silvia Lagos MD   oxyCODONE (ROXICODONE) 5 MG immediate release tablet Take 1 tablet by mouth every 8 hours as needed for Pain for up to 3 days. Intended supply: 5 days.  Take lowest dose possible to manage pain 5/20/21 5/23/21  Rosibel Naylor MD   nicotine (NICODERM CQ) 21 MG/24HR Place 1 patch onto the skin every 24 hours 5/20/21 7/19/21  Rosibel Naylor MD   .  Recent Surgical History: None = 0     Assessment             FEV1/FVC    FEV1 Predicted na      FEV1 na    FEV1 % Predicted na  FVC na  IS volume na  IBW na  FIO2% room air  SPO2 95%  RR 12  Breath Sounds: clear bilaterally      · Bronchodilator assessment at level  1  · Hyperinflation assessment at level   · Secretion Management assessment at level    ·   · [x]    Bronchodilator Assessment  BRONCHODILATOR ASSESSMENT SCORE  Score 0 1 2 3 4 5   Breath Sounds   []  Patient Baseline [x]  No Wheeze good aeration []  Faint, scattered wheezing, good aeration []  Expiratory Wheezing and or moderately diminished []  Insp/Exp wheeze and/or very diminished []  Insp/Exp and/ or marked distress   Respiratory Rate   []  Patient Baseline [x]  Less than 20 [x]  Less than 20 []  20-25 []  Greater than 25 []  Greater than 25   Peak flow % of Pred or PB [x]  NA   []  Greater than 90%  []  81-90% []  71-80% []  Less than or equal to 70%  or unable to perform []  Unable due to Respiratory Distress   Dyspnea re []  Patient Baseline [x]  No SOB [x]  No SOB []  SOB on exertion []  SOB min activity []  At rest/acute   e FEV% Predicted       [x]  NA []  Above 69%  []  Unable []  Above 60-69%  []  Unable []  Above 50-59%  []  Unable []  Above 35-49%  []  Unable []  Less than 35%  []  Unable                 []  Hyperinflation Assessment  Score 1 2 3   CXR and Breath Sounds   []  Clear []  No atelectasis  Basilar aeration []  Atelectasis or absent basilar breath sounds   Incentive Spirometry Volume  (Per IBW)   []  Greater than or equal to 15ml/Kg []  less than 15ml/Kg []  less than 15ml/Kg   Surgery within last 2 weeks []  None or general   []  Abdominal or thoracic surgery  []  Abdominal or thoracic   Chronic Pulmonary Historyre []  No []  Yes []  Yes     []  Secretion Management Assessment  Score 1 2 3   Bilateral Breath Sounds   []  Occasional Rhonchi []  Scattered Rhonchi []  Course Rhonchi and/or poor aeration   Sputum    []  Small amount of thin secretions []  Moderate amount of viscous secretions []  Copius, Viscious Yellow/ Secretions   CXR as reported by physician []  clear  []  Unavailable []  Infiltrates and/or consolidation  []  Unavailable []  Mucus Plugging and or lobar consolidation  []  Unavailable   Cough []  Strong,

## 2021-05-22 NOTE — PROGRESS NOTES
Pt resting in bed without distress, pt family updated on plan of care, pt denies any pain, pt drowsy but arouses easily

## 2021-05-23 LAB
ALBUMIN SERPL-MCNC: 2.4 G/DL (ref 3.5–5.2)
ALP BLD-CCNC: 161 U/L (ref 40–129)
ALT SERPL-CCNC: 173 U/L (ref 5–41)
ANION GAP SERPL CALCULATED.3IONS-SCNC: 14 MMOL/L (ref 9–17)
ANION GAP SERPL CALCULATED.3IONS-SCNC: 14 MMOL/L (ref 9–17)
ANION GAP SERPL CALCULATED.3IONS-SCNC: 15 MMOL/L (ref 9–17)
ANION GAP SERPL CALCULATED.3IONS-SCNC: 15 MMOL/L (ref 9–17)
AST SERPL-CCNC: 225 U/L
BILIRUB SERPL-MCNC: 20.75 MG/DL (ref 0.3–1.2)
BUN BLDV-MCNC: 35 MG/DL (ref 8–23)
CALCIUM SERPL-MCNC: 8.8 MG/DL (ref 8.6–10.4)
CHLORIDE BLD-SCNC: 94 MMOL/L (ref 98–107)
CHLORIDE BLD-SCNC: 95 MMOL/L (ref 98–107)
CHLORIDE BLD-SCNC: 96 MMOL/L (ref 98–107)
CHLORIDE BLD-SCNC: 96 MMOL/L (ref 98–107)
CO2: 18 MMOL/L (ref 20–31)
CO2: 18 MMOL/L (ref 20–31)
CO2: 19 MMOL/L (ref 20–31)
CO2: 20 MMOL/L (ref 20–31)
CREAT SERPL-MCNC: <0.4 MG/DL (ref 0.7–1.2)
GFR AFRICAN AMERICAN: ABNORMAL ML/MIN
GFR NON-AFRICAN AMERICAN: ABNORMAL ML/MIN
GFR SERPL CREATININE-BSD FRML MDRD: ABNORMAL ML/MIN/{1.73_M2}
GFR SERPL CREATININE-BSD FRML MDRD: ABNORMAL ML/MIN/{1.73_M2}
GLUCOSE BLD-MCNC: 103 MG/DL (ref 70–99)
HCT VFR BLD CALC: 40 % (ref 40.7–50.3)
HCT VFR BLD CALC: 40.5 % (ref 40.7–50.3)
HCT VFR BLD CALC: 40.7 % (ref 40.7–50.3)
HCT VFR BLD CALC: 40.9 % (ref 40.7–50.3)
HEMOGLOBIN: 13.5 G/DL (ref 13–17)
HEMOGLOBIN: 13.7 G/DL (ref 13–17)
HEMOGLOBIN: 14.1 G/DL (ref 13–17)
HEMOGLOBIN: 14.1 G/DL (ref 13–17)
INR BLD: 3.8
POTASSIUM SERPL-SCNC: 3.6 MMOL/L (ref 3.7–5.3)
POTASSIUM SERPL-SCNC: 3.7 MMOL/L (ref 3.7–5.3)
POTASSIUM SERPL-SCNC: 3.9 MMOL/L (ref 3.7–5.3)
POTASSIUM SERPL-SCNC: 5.5 MMOL/L (ref 3.7–5.3)
PROTHROMBIN TIME: 36.4 SEC (ref 11.5–14.2)
SODIUM BLD-SCNC: 127 MMOL/L (ref 135–144)
SODIUM BLD-SCNC: 128 MMOL/L (ref 135–144)
SODIUM BLD-SCNC: 129 MMOL/L (ref 135–144)
SODIUM BLD-SCNC: 130 MMOL/L (ref 135–144)
TOTAL PROTEIN: 5.8 G/DL (ref 6.4–8.3)

## 2021-05-23 PROCEDURE — 6370000000 HC RX 637 (ALT 250 FOR IP): Performed by: FAMILY MEDICINE

## 2021-05-23 PROCEDURE — 97535 SELF CARE MNGMENT TRAINING: CPT

## 2021-05-23 PROCEDURE — 82310 ASSAY OF CALCIUM: CPT

## 2021-05-23 PROCEDURE — 84450 TRANSFERASE (AST) (SGOT): CPT

## 2021-05-23 PROCEDURE — 2580000003 HC RX 258: Performed by: NURSE PRACTITIONER

## 2021-05-23 PROCEDURE — 84520 ASSAY OF UREA NITROGEN: CPT

## 2021-05-23 PROCEDURE — 97116 GAIT TRAINING THERAPY: CPT

## 2021-05-23 PROCEDURE — 97162 PT EVAL MOD COMPLEX 30 MIN: CPT

## 2021-05-23 PROCEDURE — 94640 AIRWAY INHALATION TREATMENT: CPT

## 2021-05-23 PROCEDURE — 36415 COLL VENOUS BLD VENIPUNCTURE: CPT

## 2021-05-23 PROCEDURE — 94760 N-INVAS EAR/PLS OXIMETRY 1: CPT

## 2021-05-23 PROCEDURE — 6370000000 HC RX 637 (ALT 250 FOR IP): Performed by: NURSE PRACTITIONER

## 2021-05-23 PROCEDURE — 85014 HEMATOCRIT: CPT

## 2021-05-23 PROCEDURE — APPSS30 APP SPLIT SHARED TIME 16-30 MINUTES: Performed by: NURSE PRACTITIONER

## 2021-05-23 PROCEDURE — 85610 PROTHROMBIN TIME: CPT

## 2021-05-23 PROCEDURE — 2060000000 HC ICU INTERMEDIATE R&B

## 2021-05-23 PROCEDURE — 2500000003 HC RX 250 WO HCPCS: Performed by: NURSE PRACTITIONER

## 2021-05-23 PROCEDURE — 97167 OT EVAL HIGH COMPLEX 60 MIN: CPT

## 2021-05-23 PROCEDURE — 99233 SBSQ HOSP IP/OBS HIGH 50: CPT | Performed by: INTERNAL MEDICINE

## 2021-05-23 PROCEDURE — 85018 HEMOGLOBIN: CPT

## 2021-05-23 PROCEDURE — 80051 ELECTROLYTE PANEL: CPT

## 2021-05-23 PROCEDURE — 82040 ASSAY OF SERUM ALBUMIN: CPT

## 2021-05-23 PROCEDURE — 84155 ASSAY OF PROTEIN SERUM: CPT

## 2021-05-23 PROCEDURE — 82565 ASSAY OF CREATININE: CPT

## 2021-05-23 PROCEDURE — 82247 BILIRUBIN TOTAL: CPT

## 2021-05-23 PROCEDURE — 84460 ALANINE AMINO (ALT) (SGPT): CPT

## 2021-05-23 PROCEDURE — 82947 ASSAY GLUCOSE BLOOD QUANT: CPT

## 2021-05-23 PROCEDURE — 84075 ASSAY ALKALINE PHOSPHATASE: CPT

## 2021-05-23 PROCEDURE — 2580000003 HC RX 258: Performed by: FAMILY MEDICINE

## 2021-05-23 PROCEDURE — 6360000002 HC RX W HCPCS: Performed by: NURSE PRACTITIONER

## 2021-05-23 PROCEDURE — 99232 SBSQ HOSP IP/OBS MODERATE 35: CPT | Performed by: FAMILY MEDICINE

## 2021-05-23 RX ORDER — SODIUM BICARBONATE 650 MG/1
650 TABLET ORAL 4 TIMES DAILY
Status: DISCONTINUED | OUTPATIENT
Start: 2021-05-23 | End: 2021-05-26 | Stop reason: HOSPADM

## 2021-05-23 RX ADMIN — SPIRONOLACTONE 25 MG: 25 TABLET ORAL at 12:55

## 2021-05-23 RX ADMIN — OXYCODONE 5 MG: 5 TABLET ORAL at 20:22

## 2021-05-23 RX ADMIN — SODIUM BICARBONATE 650 MG: 650 TABLET ORAL at 12:55

## 2021-05-23 RX ADMIN — OXYCODONE 5 MG: 5 TABLET ORAL at 13:00

## 2021-05-23 RX ADMIN — FOLIC ACID: 5 INJECTION, SOLUTION INTRAMUSCULAR; INTRAVENOUS; SUBCUTANEOUS at 09:37

## 2021-05-23 RX ADMIN — SODIUM BICARBONATE 650 MG: 650 TABLET ORAL at 09:36

## 2021-05-23 RX ADMIN — SODIUM BICARBONATE 650 MG: 650 TABLET ORAL at 17:25

## 2021-05-23 RX ADMIN — BUDESONIDE AND FORMOTEROL FUMARATE DIHYDRATE 2 PUFF: 160; 4.5 AEROSOL RESPIRATORY (INHALATION) at 09:23

## 2021-05-23 RX ADMIN — FUROSEMIDE 20 MG: 20 TABLET ORAL at 09:36

## 2021-05-23 RX ADMIN — PENTOXIFYLLINE 400 MG: 400 TABLET, EXTENDED RELEASE ORAL at 09:43

## 2021-05-23 RX ADMIN — SODIUM CHLORIDE, PRESERVATIVE FREE 10 ML: 5 INJECTION INTRAVENOUS at 21:00

## 2021-05-23 RX ADMIN — SODIUM BICARBONATE 650 MG: 650 TABLET ORAL at 20:22

## 2021-05-23 RX ADMIN — PENTOXIFYLLINE 400 MG: 400 TABLET, EXTENDED RELEASE ORAL at 12:55

## 2021-05-23 RX ADMIN — MULTIVITAMIN TABLET 1 TABLET: TABLET at 09:36

## 2021-05-23 RX ADMIN — PENTOXIFYLLINE 400 MG: 400 TABLET, EXTENDED RELEASE ORAL at 17:25

## 2021-05-23 RX ADMIN — LACTULOSE 20 G: 20 SOLUTION ORAL at 20:22

## 2021-05-23 RX ADMIN — PANTOPRAZOLE SODIUM 40 MG: 40 TABLET, DELAYED RELEASE ORAL at 09:43

## 2021-05-23 ASSESSMENT — PAIN SCALES - GENERAL
PAINLEVEL_OUTOF10: 6
PAINLEVEL_OUTOF10: 9

## 2021-05-23 NOTE — FLOWSHEET NOTE
is called to Patient's room to complete Advanced Directives. Present is 3 family members.  explains documents to patient and family. Patient was very weak. No other needs were expressed.  completes documents and made copies for patient, and lite chart. Follow up as needed. 05/23/21 1433   Encounter Summary   Services provided to: Patient and family together   Referral/Consult From: Nurse   Support System Children;Family members   Continue Visiting   (5-23-21)   Complexity of Encounter Moderate   Length of Encounter 45 minutes   Spiritual Assessment Completed Yes   Advance Care Planning Yes   Routine   Type Follow up   Assessment Passive   Intervention Explored feelings, thoughts, concerns; Active listening;Discussed illness/injury and it's impact   Outcome Expressed gratitude;Receptive;Engaged in conversation;Expressed feelings/needs/concerns   Advance Directives (For Healthcare)   Healthcare Directive Yes, patient has an advance directive for healthcare treatment   Advance Directives Documents explained;Living will completed; Healthcare power of attornery completed   Healthcare Agent Appointed Adult Children  Angela Anderson)   Healthcare Agent's Name [address] [facility] Agent's Phone Number [phone]

## 2021-05-23 NOTE — PROGRESS NOTES
[place] [facility]    Gastroenterology Daily Progress Note      Patient:   Ariana Mistry   :    1955   Facility:   [facility]  Date:     2021  Consultant:   PEEWEE Alvarez CNP, CNP      SUBJECTIVE  77 y.o. male admitted 2021 with Abdominal pain [R10.9] and seen for cirrhosis likely alcoholic hepatitis. The pt was seen and examined. Continues to have generalized abdominal pain with decreased appetite. Has had multiple non bloody BM overnight. lft's remain high despite pentoxifylline.        OBJECTIVE  Scheduled Meds:   sodium bicarbonate  650 mg Oral 4x Daily    thiamine and folic acid IVPB   Intravenous Daily    sodium chloride flush  5-40 mL Intravenous 2 times per day    pentoxifylline  400 mg Oral TID WC    [Held by provider] folic acid  1 mg Oral Daily    furosemide  20 mg Oral Daily    lactulose  20 g Oral TID    budesonide-formoterol  2 puff Inhalation BID    multivitamin  1 tablet Oral Daily    nicotine  1 patch Transdermal Q24H    pantoprazole  40 mg Oral QAM AC    spironolactone  25 mg Oral Daily    [Held by provider] vitamin B-1  100 mg Oral Daily    sodium chloride flush  5-40 mL Intravenous 2 times per day       Vital Signs:  /84   Pulse 105   Temp 97.6 °F (36.4 °C) (Oral)   Resp 16   Ht 6' 1\" (1.854 m)   Wt 180 lb (81.6 kg)   SpO2 93%   BMI 23.75 kg/m²      Physical Exam:     General Appearance: alert and oriented to person, place and time, well-developed and mal-nourished, in no acute distress  Skin: warm and dry, no rash or erythema has jandice  Head: normocephalic and atraumatic  Eyes: pupils equal, round, and reactive to light, extraocular eye movements intact, conjunctivae icteric  ENT: hearing grossly normal bilaterally  Neck: neck supple and non tender without mass, no thyromegaly or thyroid nodules, no cervical lymphadenopathy   Pulmonary/Chest: clear to auscultation bilaterally- no wheezes, rales or rhonchi, normal air movement, no respiratory distress  Cardiovascular: tachycardic rate, regular rhythm, normal S1 and S2, no murmurs, rubs, clicks or gallops, distal pulses intact, no carotid bruits  Abdomen: soft, non-tender, distended, normal bowel sounds, no masses or organomegaly  Extremities: no cyanosis, clubbing or edema  Musculoskeletal: normal range of motion, no joint swelling, deformity or tenderness  Neurologic: no cranial nerve deficit and muscle strength normal    Lab and Imaging Review     CBC  Recent Labs     05/21/21  1348 05/22/21 0459 05/23/21  0029 05/23/21  0524 05/23/21  1140   WBC 8.3 10.1  --   --   --    HGB 13.6 13.2 13.5 13.7 14.1   HCT 39.2* 38.1* 40.0* 40.7 40.5*   MCV 97.5 97.4  --   --   --    PLT 70* 76*  --   --   --        BMP  Recent Labs     05/21/21  1348 05/22/21 0459 05/23/21  0029 05/23/21  0524 05/23/21  1140   * 129* 128* 129* 127*   K 3.7 3.9 3.7 5.5* 3.9   CL 97* 96* 95* 96* 94*   CO2 21 20 18* 19* 18*   BUN 27* 32*  --  35*  --    CREATININE 0.77 0.97  --  <0.40*  --    GLUCOSE 124* 87  --  103*  --    CALCIUM 9.1 9.0  --  8.8  --        LFTS  Recent Labs     05/21/21  1348 05/22/21 0459 05/23/21  0524   ALKPHOS 197* 195* 161*   * 168* 173*   * 219* 225*   PROT 6.1* 6.0* 5.8*   BILITOT 18.39* 19.63* 20.75*   LABALBU 2.4* 2.5* 2.4*       AMYLASE/LIPASE/AMMONIA  Recent Labs     05/21/21  1823 05/22/21 0459   AMYLASE  --  89   LIPASE  --  35   AMMONIA 27  --        PT/INR  Recent Labs     05/21/21  1348 05/22/21 0459   PROTIME 33.4* 35.4*   INR 3.4 3.7     US ABDOMEN LIMITED Specify organ? LIVER     Result Date: 5/17/2021  EXAMINATION: RIGHT UPPER QUADRANT ULTRASOUND 5/17/2021 9:20 am COMPARISON: CT 05/16/2021 HISTORY: ORDERING SYSTEM PROVIDED HISTORY: to rule thromobosis, cirrhosis ? TECHNOLOGIST PROVIDED HISTORY: to rule thromobosis, cirrhosis ? Specify organ?->LIVER FINDINGS: LIVER:  Nodular cirrhotic liver. Liver length 14 cm.   No focal liver lesion is demonstrated. A less than 5 mm hypodense liver lesion reported on the previous CT scan is not demonstrated on this exam.  MR imaging may be considered. BILIARY SYSTEM:  There is no intrahepatic biliary ductal dilatation. The common bile duct is not dilated. Measures 2-3 mm. RIGHT KIDNEY: The right kidney is grossly unremarkable without evidence of hydronephrosis. PANCREAS:  Visualized portions of the pancreas are unremarkable. OTHER: Color and spectral Doppler imaging was performed. Normal waveform patterns noted in the main portal as well as left right portal vein branches. Peak systolic velocities are low. Peak systolic velocity in the main portal vein is 7.9 cm/S. The hepatic veins also patent. No sonographic evidence for portal vein thrombosis. There is hepatic pedal flow in the portal vein. Large volume ascites.      1. Nodular cirrhotic liver. 2. Large volume ascites. Mild gallbladder wall thickening attributed to hypoproteinemic state from the cirrhosis and ascites. No cholelithiasis. 3. No sonographic evidence for portal vein thrombosis. There is normal directional flow in the portal vein. 4. The less than 5 mm hypodense lesion reported on prior CT scan is not sonographically apparent. Consider further imaging with MRI to definitively exclude the lesion.      CT CHEST ABDOMEN PELVIS W CONTRAST     Result Date: 5/16/2021  EXAMINATION: CT OF THE CHEST, ABDOMEN, AND PELVIS WITH CONTRAST 5/16/2021 12:34 pm TECHNIQUE: CT of the chest, abdomen and pelvis was performed with the administration of intravenous contrast. Multiplanar reformatted images are provided for review. Dose modulation, iterative reconstruction, and/or weight based adjustment of the mA/kV was utilized to reduce the radiation dose to as low as reasonably achievable.  COMPARISON: 05/05/2019 HISTORY: ORDERING SYSTEM PROVIDED HISTORY: new sob, evidence of liver failure, rectal bleeding abdominal pain, bloating TECHNOLOGIST PROVIDED HISTORY: new sob, evidence of liver failure, rectal bleeding abdominal pain, bloating Decision Support Exception - unselect if not a suspected or confirmed emergency medical condition->Emergency Medical Condition (MA) Reason for Exam: bloated, abdominal pain , nausea Acuity: Unknown Type of Exam: Unknown FINDINGS: Chest: Mediastinum: The heart and great vessels are normal in size. Calcified atheromatous plaque and coronary calcifications are noted. No pericardial effusion. No enlarged or suspicious-appearing lymph nodes are identified. Lungs/pleura: Paraseptal and centrilobular emphysematous disease. Subsegmental atelectasis or scarring in the lung bases. No consolidation. No evidence for edema. No effusion. The central airway is patent. Soft Tissues/Bones: No acute findings. Abdomen/Pelvis: Organs: The liver is small with morphologic findings of cirrhosis. Tiny less than 5 mm hypoattenuating liver lesion in the dome of segment 4 is too small to characterize. This is not clearly demonstrated on the prior exam, which may have been due to the absence of contrast.  The spleen is normal in size. The portal venous system is patent. The gallbladder and biliary tree reveal no acute findings. The pancreas, adrenals and kidneys reveal no acute findings. GI/Bowel: No evidence for bowel obstruction. Edematous appearance of the proximal colon is noted, which may be related to portal hypertensive colopathy. Normal appendix. Pelvis: No acute findings. Peritoneum/Retroperitoneum: Moderate volume abdominopelvic ascites. No loculated fluid collection. No free air. The aorta is normal in caliber. Bones/Soft Tissues: No acute osseous abnormality identified. Advanced disc disease at L5-S1.      1. Emphysema and subsegmental atelectasis or scarring in the lung bases. 2.  Morphologic findings of cirrhosis are demonstrated. Less than 5 mm hypoattenuating liver lesion in the central left hepatic lobe is too small to characterize. Attention to on follow-up is recommended. 3.  Moderate volume abdominopelvic ascites. 4.  Edematous appearance of the proximal colon, favored to represent portal hypertensive colopathy versus inflammatory colitis.           ASSESSMENT/plan  1. Decompensated Cirrhosis with component of alcoholic hepatitis  -case d/w dr Barry, will continue the pentoxifylline, did discuss the risks and benefits of steroids including the risk of infection and death with the family and they are unsure if they want any steroids started  -will have IR place [place] tube tomorrow  and start tube feedings  -paracentesis tomorrow with labs to r/o sbp  -continue the lactulose  -trend INR and watch for any sign of bleeding  -continue diuretics and monitor renal function    2.elevated afp and possible liver lesion,   -eventual mri with liver lesion protocol  -hepatitis c viral load is pending        This plan was formulated in collaboration with Dr. Barry .     Electronically signed by: PEEWEE Ruiz - CNP on 5/23/2021 at 1:25 PM

## 2021-05-23 NOTE — PROGRESS NOTES
Dr. Win Kulkarni in to see pt, updated on pt condition, plan for paracentesis tomorrow in IR and for IR to place NG tube for feeding when he is down there for para

## 2021-05-23 NOTE — PROGRESS NOTES
Occupational Therapy   Occupational Therapy Initial Assessment  Date: 2021   Patient Name: Bela Butt  MRN: [id number]     : 1955    Date of Service: 2021    Discharge Recommendations:  Patient would benefit from continued therapy after discharge Pt currently functioning below baseline. Would suggest additional therapy at time of discharge to maximize long term outcomes and prevent re-admission. Please refer to AM-PAC score for current level of function. RN reports patient is medically stable for therapy treatment this date. Chart reviewed prior to treatment and patient is agreeable for therapy. All lines intact and patient positioned comfortably at end of treatment. All patient needs addressed prior to ending therapy session. Bela Butt is a 77 y.o. male who presents to the emergency department with jaundice and swelling. Patient was recently admitted to the hospital and discharged yesterday for alcoholic cirrhosis of the liver with ascites. Patient underwent scopes due to rectal bleeding at that time as well. Apparently patient feels that his abdomen is increasing in size and swelling also swelling located lower extremities. Therefore patient was directed back to emergency room for further evaluation.     Assessment   Performance deficits / Impairments: Decreased functional mobility ; Decreased ADL status; Decreased strength;Decreased safe awareness;Decreased endurance;Decreased balance;Decreased posture  Prognosis: Good  Decision Making: High Complexity  OT Education: OT Role;Plan of Care;Home Exercise Program;Precautions; ADL Adaptive Strategies;Transfer Training;Energy Conservation;Equipment; Family Education  REQUIRES OT FOLLOW UP: Yes  Activity Tolerance  Activity Tolerance: Patient limited by fatigue  Safety Devices  Safety Devices in place: Yes  Type of devices: Call light within reach;Nurse notified;Gait belt;Patient at risk for falls; Left in bed;Bed alarm in place Patient Diagnosis(es): The encounter diagnosis was Acute liver failure without hepatic coma. has a past medical history of Anxiety, Asthma, Blind left eye, Cervical spine fracture ([facility] [address] COPD (chronic obstructive pulmonary disease) ([facility] [address] Depression, H/O: substance abuse ([facility] [address] [facility] coma, [facility] Hepatitis C, History of suicidal tendencies, Hx: UTI (urinary tract infection), and Schizophrenia, schizo-affective ([facility] [address] has a past surgical history that includes Umbilical hernia repair; Cataract removal with implant; Vein Surgery; hernia repair; Eye surgery (Left, 8/27/13); vitrectomy (Left, 08/27/2013); eye surgery (Left, 9/24/13); Upper gastrointestinal endoscopy (N/A, 5/19/2021); and Colonoscopy (N/A, 5/19/2021). Restrictions  Restrictions/Precautions  Restrictions/Precautions: General Precautions, Fall Risk    Subjective   General  Chart Reviewed: Yes  Patient assessed for rehabilitation services?: Yes  Family / Caregiver Present: No  Patient Currently in Pain: No  Pain Assessment  Pain Level: 7  Vital Signs  Patient Currently in Pain: No  Social/Functional History  Social/Functional History  Lives With: Daughter (just recently moved in with dtr)  Type of Home: Apartment  Home Layout: One level  Home Access: Stairs to enter with rails  Entrance Stairs - Number of Steps: 19 steps  Bathroom Shower/Tub: Tub/Shower unit  Bathroom Toilet: Standard  Home Equipment:  (uses no devices for gait)  Receives Help From: Family  ADL Assistance: Needs assistance  Bath: Moderate assistance (was able to bathe himself until recently)  Dressing: Moderate assistance  Grooming: Minimal assistance  Feeding: Independent  Homemaking Assistance: Needs assistance  Meal Prep: Total  Laundry: Total  Vacuuming: Total  Cleaning: Total  Gardening:  Total  Yard Work: Total  Driving: Total  Ambulation Assistance: Independent  Transfer Assistance: Independent  Active : No  Patient's  Info: daughter drives  Occupation: Retired (factory)       Objective   Vision: Impaired (blind in L eye/glass eye)  Vision Exceptions: Wears glasses at all times  Hearing: Within functional limits    Orientation  Overall Orientation Status: Within Functional Limits  Observation/Palpation  Posture: Fair  Observation: resting in bed, appears fatigued/reporting pain in abdomen  Balance  Sitting Balance: Stand by assistance  Standing Balance: Minimal assistance  Functional Mobility  Functional - Mobility Device: Rolling Walker  Assist Level: Minimal assistance  Functional Mobility Comments: Pt completed functional mob in room with min A with reliance on RW for support. Pt fatigues very easily, wanting to get back to bed. ADL  Feeding: Independent  Grooming: Stand by assistance;Setup;Minimal assistance  UE Bathing: Minimal assistance  LE Bathing: Moderate assistance;Minimal assistance  UE Dressing: Minimal assistance  LE Dressing: Minimal assistance; Moderate assistance  Toileting: Minimal assistance  Tone RUE  RUE Tone: Normotonic  Tone LUE  LUE Tone: Normotonic  Coordination  Movements Are Fluid And Coordinated: Yes     Bed mobility  Rolling to Left: Minimal assistance  Supine to Sit: Contact guard assistance  Sit to Supine: Contact guard assistance;Minimal assistance  Transfers  Sit to stand: Contact guard assistance;Minimal assistance  Stand to sit: Contact guard assistance;Minimal assistance  Transfer Comments: inc. time to complete. Pt fatigues very easily. forward flexed posture, cues to achieve fully upright posture     Cognition  Overall Cognitive Status: Exceptions  Safety Judgement: Decreased awareness of need for safety;Decreased awareness of need for assistance  Problem Solving: Assistance required to generate solutions  Insights: Decreased awareness of deficits  Perception  Overall Perceptual Status: WFL     Sensation  Overall Sensation Status: WFL        LUE AROM (degrees)  LUE AROM : WFL  RUE AROM (degrees)  RUE AROM : WFL  LUE Strength  Gross LUE Strength: WFL  RUE Strength  Gross RUE Strength: WFL                   Plan   Plan  Times per week: 4-5x/week, 1-2x/day  Current Treatment Recommendations: Strengthening, Balance Training, Functional Mobility Training, Endurance Training, Safety Education & Training, Self-Care / ADL, Patient/Caregiver Education & Training, Equipment Evaluation, Education, & procurement, Positioning       AM-PAC Inpatient Daily Activity Raw Score: 16 (05/23/21 1335)  AM-PAC Inpatient ADL T-Scale Score : 35.96 (05/23/21 1335)  ADL Inpatient CMS 0-100% Score: 53.32 (05/23/21 1335)  ADL Inpatient CMS G-Code Modifier : CK (05/23/21 1335)    Goals  Short term goals  Time Frame for Short term goals: by discharge, pt will  Short term goal 1: demo S/SBA with ADL transfers with good safety/pacing and AD/DME as needed  Short term goal 2: demo S/SBA with functional mob in room with good safety/pacing and DME/AD as needed  Short term goal 3: demo S/SBA with toileting routine with good safety/pacing  Short term goal 4: demo S/SBA with UB/LB ADLs with good safety/pacing, DME as needed  Short term goal 5: demo and verb good understanding of fall prevention techs, EC/WS techs, equip needs, d/c recommendations, and B UE HEP  Patient Goals   Patient goals : to feel better, have pain subside       Therapy Time   Individual Concurrent Group Co-treatment   Time In 1142         Time Out 1207         Minutes 25             treatment min: 15      Deneen Pod, OT

## 2021-05-23 NOTE — PROGRESS NOTES
Intravenous 2 times per day    pentoxifylline  400 mg Oral TID WC    [Held by provider] folic acid  1 mg Oral Daily    furosemide  20 mg Oral Daily    lactulose  20 g Oral TID    budesonide-formoterol  2 puff Inhalation BID    multivitamin  1 tablet Oral Daily    nicotine  1 patch Transdermal Q24H    pantoprazole  40 mg Oral QAM AC    spironolactone  25 mg Oral Daily    [Held by provider] vitamin B-1  100 mg Oral Daily    sodium chloride flush  5-40 mL Intravenous 2 times per day     Continuous Infusions:    sodium chloride      sodium chloride 50 mL/hr at 21 0345    sodium chloride       PRN Meds: sodium chloride flush, sodium chloride, LORazepam **OR** LORazepam **OR** LORazepam **OR** LORazepam **OR** LORazepam **OR** LORazepam **OR** LORazepam **OR** LORazepam, oxyCODONE, sodium chloride flush, sodium chloride, potassium chloride, magnesium sulfate, acetaminophen, promethazine **OR** ondansetron, albuterol    Data:     Past Medical History:   has a past medical history of Anxiety, Asthma, Blind left eye, Cervical spine fracture ([facility] [address] COPD (chronic obstructive pulmonary disease) ([facility] [address] Depression, H/O: substance abuse ([facility] [address] Hep C w/o coma, [facility] Hepatitis C, History of suicidal tendencies, Hx: UTI (urinary tract infection), and Schizophrenia, schizo-affective ([facility] [address] Social History:   reports that he has been smoking cigarettes. He has a 150.00 pack-year smoking history. He has never used smokeless tobacco. He reports previous alcohol use of about 28.0 standard drinks of alcohol per week. He reports current drug use. Drug: Marijuana.      Family History:   Family History   Problem Relation Age of Onset    Heart Disease Father        Vitals:  /72   Pulse 88   Temp 97.9 °F (36.6 °C) (Oral)   Resp 16   Ht 6' 1\" (1.854 m)   Wt 180 lb (81.6 kg)   SpO2 97%   BMI 23.75 kg/m²   Temp (24hrs), Av.6 °F (36.4 °C), Min:97.3 °F (36.3 °C), Max:97.9 °F (36.6 °C)    No results for input(s): POCGLU in the last 72 hours. I/O (24Hr): Intake/Output Summary (Last 24 hours) at 5/23/2021 0720  Last data filed at 5/23/2021 0413  Gross per 24 hour   Intake 1570 ml   Output 300 ml   Net 1270 ml       Labs:  Hematology:  Recent Labs     05/21/21  1348 05/22/21  0459 05/22/21  1623 05/23/21  0029 05/23/21  0524   WBC 8.3 10.1  --   --   --    RBC 4.02* 3.91*  --   --   --    HGB 13.6 13.2 14.0 13.5 13.7   HCT 39.2* 38.1* 40.8 40.0* 40.7   MCV 97.5 97.4  --   --   --    MCH 33.8* 33.8*  --   --   --    MCHC 34.7 34.6  --   --   --    RDW 16.3* 16.3*  --   --   --    PLT 70* 76*  --   --   --    MPV 10.0 11.0  --   --   --    INR 3.4 3.7  --   --   --      Chemistry:  Recent Labs     05/21/21  1348 05/21/21  1348 05/21/21  1515 05/21/21  1715 05/21/21  1715 05/22/21  0459 05/22/21  1038 05/22/21  1623 05/22/21  2207 05/23/21  0029 05/23/21  0524   *  --   --   --    < > 129* 127* 130*  --  128* 129*   K 3.7  --   --   --    < > 3.9 4.5 4.4  --  3.7 5.5*   CL 97*  --   --   --    < > 96* 96* 96*  --  95* 96*   CO2 21  --   --   --    < > 20 19* 20  --  18* 19*   GLUCOSE 124*  --   --   --   --  87  --   --   --   --   --    BUN 27*  --   --   --   --  32*  --   --   --   --   --    CREATININE 0.77  --   --   --   --  0.97  --   --   --   --   --    MG  --   --   --   --   --  2.2  --   --   --   --   --    ANIONGAP 11  --   --   --    < > 13 12 14  --  15 14   LABGLOM >60  --   --   --   --  >60  --   --   --   --   --    GFRAA >60  --   --   --   --  >60  --   --   --   --   --    CALCIUM 9.1  --   --   --   --  9.0  --   --   --   --   --    CAION  --   --   --   --   --  1.20  --   --   --   --   --    PHOS  --   --   --   --   --  4.4  --   --   --   --   --    TROPHS 18   < > 16 15  --   --  16 20 19  --   --    MYOGLOBIN 128*  --  116* 100*  --   --   --   --   --   --   --     < > = values in this interval not displayed.      Recent Labs     05/21/21  8378 05/21/21  1823 05/22/21  1734 PROT 6.1*  --  6.0*   LABALBU 2.4*  --  2.5*   *  --  219*   *  --  168*   ALKPHOS 197*  --  195*   BILITOT 18.39*  --  19.63*   AMMONIA  --  27  --    AMYLASE  --   --  89   LIPASE  --   --  35   TRIG  --   --  60     ABG:No results found for: POCPH, PHART, PH, POCPCO2, SVO5VDX, PCO2, POCPO2, PO2ART, PO2, POCHCO3, JKE5OBX, HCO3, NBEA, PBEA, BEART, BE, THGBART, THB, WEX6ZIZ, MFBE9LZE, N0UUEOXV, O2SAT, FIO2  Lab Results   Component Value Date/Time    SPECIAL NOT REPORTED 05/16/2021 03:00 PM     Lab Results   Component Value Date/Time    CULTURE NO GROWTH 6 DAYS 05/16/2021 03:00 PM       Radiology:  XR CHEST PORTABLE    Result Date: 5/21/2021  Low lung volumes with bibasilar atelectasis. The lungs are otherwise clear. US ABDOMEN LIMITED Specify organ? LIVER    Result Date: 5/17/2021  1. Nodular cirrhotic liver. 2. Large volume ascites. Mild gallbladder wall thickening attributed to hypoproteinemic state from the cirrhosis and ascites. No cholelithiasis. 3. No sonographic evidence for portal vein thrombosis. There is normal directional flow in the portal vein. 4. The less than 5 mm hypodense lesion reported on prior CT scan is not sonographically apparent. Consider further imaging with MRI to definitively exclude the lesion. US GUIDED PARACENTESIS    Result Date: 5/19/2021  Successful ultrasound guided paracentesis. CT CHEST ABDOMEN PELVIS W CONTRAST    Result Date: 5/16/2021  1. Emphysema and subsegmental atelectasis or scarring in the lung bases. 2.  Morphologic findings of cirrhosis are demonstrated. Less than 5 mm hypoattenuating liver lesion in the central left hepatic lobe is too small to characterize. Attention to on follow-up is recommended. 3.  Moderate volume abdominopelvic ascites. 4.  Edematous appearance of the proximal colon, favored to represent portal hypertensive colopathy versus inflammatory colitis.        Physical Examination:        General appearance:  alert, cooperative and no distress, scleral icterus  Mental Status:  oriented to person, place and time and normal affect  Lungs: decreased breath sounds bilaterally   Heart:  regular rate and rhythm, no murmur  Abdomen:  soft, nontender, distended, normal bowel sounds     Extremities:  2+ BLE pitting edema, no redness, tenderness in the calves  Skin:  no gross lesions, rashes, induration    Assessment:        Hospital Problems         Last Modified POA    * (Principal) Alcoholic liver failure ([facility] [address] 5/21/2021 Yes    COPD (chronic obstructive pulmonary disease) ([facility] [address] 5/21/2021 Yes    Chronic hepatitis C without hepatic coma ([facility] [address] 5/21/2021 Yes    Thrombocytopenia ([address] 5/21/2021 Yes    Abnormal INR 5/21/2021 Yes    Hyponatremia 5/21/2021 Yes          Plan:        1. Alcoholic liver failure  2. Elevated LFTs  3. Supra therapeutic INR   4. Hyponatremia   5. Metabolic acidosis   - MELD score of 34  - poor prognosis   - GI consulted   - pt started on doxycycline (day 2)  - CIWA protocol   - continue lasix/aldactone/lactulose  - continue banana bag  - IR consult for possible therapeutic paracentesis   - underwent U/S guided paracentesis on 5/19, 4250 ml removed   - v/s per unit protocol   - PPI   - sodium stable at 129  - potassium elevated this mroning at 5.5.  Will repeat BMP to confirm and administer kayexalate if needed  - start sodium bicarb  - d/c IVF       Steven Hammonds MD  5/23/2021  7:20 AM

## 2021-05-23 NOTE — PROGRESS NOTES
Physical Therapy    Facility/Department: [facility]GR[facility] C[facility]  Initial Assessment    NAME: Meredith Sales  : 1955  MRN: [id number]    Date of Service: 2021    Discharge Recommendations:  [address] (pt may need SNF based on current level of weakness)      HPI:  77year old male admitted through ER 21 with severe abd pain, s/p recent hospital stay at [facility]---was only home 1 day and had to come back; Pt has alcoholic liver failure, chronic Hepatitis C, thrombocytopenia; Now referred to PT for evaluation and treatment. Assessment   Body structures, Functions, Activity limitations: Decreased strength  Prognosis: Good  Decision Making: Medium Complexity  PT Education: Transfer Training;Gait Training;PT Role  REQUIRES PT FOLLOW UP: Yes  Activity Tolerance  Activity Tolerance: Patient limited by pain       Patient Diagnosis(es): The encounter diagnosis was Acute liver failure without hepatic coma. has a past medical history of Anxiety, Asthma, Blind left eye, Cervical spine fracture ([address] COPD (chronic obstructive pulmonary disease) ([address] Depression, H/O: substance abuse ([address] Hep C w/o coma, chronic ([address] Hepatitis C, History of suicidal tendencies, Hx: UTI (urinary tract infection), and Schizophrenia, schizo-affective ([address] has a past surgical history that includes Umbilical hernia repair; Cataract removal with implant; Vein Surgery; hernia repair; Eye surgery (Left, 13); vitrectomy (Left, 2013); eye surgery (Left, 13); Upper gastrointestinal endoscopy (N/A, 2021); and Colonoscopy (N/A, 2021).     Restrictions     Vision/Hearing  Vision: Impaired (blind in L eye/glass eye)  Vision Exceptions: Wears glasses at all times  Hearing: Within functional limits     Subjective  General  Chart Reviewed: Yes  Patient assessed for rehabilitation services?: Yes  Family / Caregiver Present: Yes  Pain Screening  Patient Currently in Pain: No  Vital Signs  Patient Currently in Pain: No       Orientation  Orientation  Overall Orientation Status: Within Normal Limits (pt is not feeling well and family member answers many of the questiosn;  he is answering orientation questions)  Social/Functional History  Social/Functional History  Lives With: Daughter (just recently moved in with dtr)  Type of Home: Apartment  Home Layout: One level  Home Access: Stairs to enter with rails  Entrance Stairs - Number of Steps: 19 steps  Bathroom Shower/Tub: Tub/Shower unit  Bathroom Toilet: Standard  Home Equipment:  (uses no devices for gait)  Receives Help From: Family  ADL Assistance: Needs assistance  Bath: Moderate assistance (was able to bathe himself until recently)  Dressing: Moderate assistance  Grooming: Minimal assistance  Feeding: Independent  Homemaking Assistance: Needs assistance  Meal Prep: Total  Laundry: Total  Vacuuming: Total  Cleaning: Total  Gardening:  Total  Yard Work: Total  Driving: Total  Ambulation Assistance: Independent  Transfer Assistance: Independent  Active : No  Occupation: Retired (factory)  Cognition        Objective          PROM RLE (degrees)  RLE PROM:  (Kylie Roz limited due to generalized pain)  Strength RLE  Strength RLE: Exception  Strength LLE  Strength LLE: Exception  Strength RUE  Strength RUE: Exception  Strength LUE  Strength LUE: Exception  Strength Other  Other: BUE/LE at least 4-/5 but min effort due to lethargy and pain        Bed mobility  Rolling to Left: Minimal assistance  Supine to Sit: Contact guard assistance  Sit to Supine: Contact guard assistance;Minimal assistance  Transfers  Sit to Stand: Contact guard assistance  Stand to sit: Contact guard assistance  Ambulation  Ambulation?: Yes  More Ambulation?: No  Ambulation 1  Surface: level tile  Device: Rolling Walker  Assistance: Minimal assistance  Gait Deviations: Slow Mayela;Decreased step length  Distance: 25 feet in the room with walker and MIN assist;  pt is very flexed during gait;  attempted gait without devices but pt needed UE support     Balance  Sitting - Static: Fair  Sitting - Dynamic: Fair  Standing - Static: Fair;-  Standing - Dynamic: Fair;-        Plan   Plan  Times per week: 1-2x/day, 5-6 days/week  Current Treatment Recommendations: Transfer Training, Strengthening  Safety Devices  Type of devices: Gait belt, Left in bed    G-Code       OutComes Score                                                  AM-PAC Score  AM-PAC Inpatient Mobility Raw Score : 18 (05/23/21 1143)  AM-PAC Inpatient T-Scale Score : 43.63 (05/23/21 1143)  Mobility Inpatient CMS 0-100% Score: 46.58 (05/23/21 1143)  Mobility Inpatient CMS G-Code Modifier : CK (05/23/21 1143)          Goals  Short term goals  Time Frame for Short term goals: 10 days  Short term goal 1: I bed mobility  Short term goal 2: I trasnfers  Short term goal 3: I gait with appropriate devices 150 feet  Short term goal 4: 17 steps using rail for support  Patient Goals   Patient goals : to feel better       Therapy Time   Individual Concurrent Group Co-treatment   Time In 7940         Time Out 1210         Minutes [address] Claudia Wynne, PT

## 2021-05-24 ENCOUNTER — APPOINTMENT (OUTPATIENT)
Dept: INTERVENTIONAL RADIOLOGY/VASCULAR | Age: 66
DRG: 280 | End: 2021-05-24
Payer: MEDICARE

## 2021-05-24 LAB
ABO/RH: NORMAL
ALBUMIN SERPL-MCNC: 2.7 G/DL (ref 3.5–5.2)
ALBUMIN/GLOBULIN RATIO: ABNORMAL (ref 1–2.5)
ALP BLD-CCNC: 194 U/L (ref 40–129)
ALT SERPL-CCNC: 194 U/L (ref 5–41)
ANION GAP SERPL CALCULATED.3IONS-SCNC: 15 MMOL/L (ref 9–17)
ANION GAP SERPL CALCULATED.3IONS-SCNC: 17 MMOL/L (ref 9–17)
ANION GAP SERPL CALCULATED.3IONS-SCNC: 18 MMOL/L (ref 9–17)
ANION GAP SERPL CALCULATED.3IONS-SCNC: 19 MMOL/L
ANTIBODY SCREEN: NEGATIVE
ARM BAND NUMBER: NORMAL
AST SERPL-CCNC: 267 U/L
BILIRUB SERPL-MCNC: 21.3 MG/DL (ref 0.3–1.2)
BILIRUBIN DIRECT: 14 MG/DL
BILIRUBIN, INDIRECT: 7.3 MG/DL (ref 0–1)
CHLORIDE BLD-SCNC: 95 MMOL/L (ref 98–107)
CHLORIDE BLD-SCNC: 95 MMOL/L (ref 98–107)
CHLORIDE BLD-SCNC: 96 MMOL/L (ref 98–107)
CHLORIDE BLD-SCNC: 97 MMOL/L (ref 98–107)
CO2: 16 MMOL/L (ref 20–31)
CO2: 18 MMOL/L (ref 20–31)
CO2: 18 MMOL/L (ref 20–31)
CO2: 20 MMOL/L (ref 20–31)
CREAT SERPL-MCNC: <0.4 MG/DL (ref 0.7–1.2)
EXPIRATION DATE: NORMAL
GFR AFRICAN AMERICAN: ABNORMAL ML/MIN
GFR NON-AFRICAN AMERICAN: ABNORMAL ML/MIN
GFR SERPL CREATININE-BSD FRML MDRD: ABNORMAL ML/MIN/{1.73_M2}
GFR SERPL CREATININE-BSD FRML MDRD: ABNORMAL ML/MIN/{1.73_M2}
GLOBULIN: ABNORMAL G/DL (ref 1.5–3.8)
HCT VFR BLD CALC: 37 % (ref 40.7–50.3)
HCT VFR BLD CALC: 41.5 % (ref 40.7–50.3)
HCT VFR BLD CALC: 42 % (ref 40.7–50.3)
HCT VFR BLD CALC: 42.5 % (ref 40.7–50.3)
HEMOGLOBIN: 12.9 G/DL (ref 13–17)
HEMOGLOBIN: 14.3 G/DL (ref 13–17)
HEMOGLOBIN: 14.4 G/DL (ref 13–17)
HEMOGLOBIN: 14.7 G/DL (ref 13–17)
INR BLD: 2.4
INR BLD: 4.4
PARTIAL THROMBOPLASTIN TIME: 58.6 SEC (ref 23.9–33.8)
POTASSIUM SERPL-SCNC: 3.7 MMOL/L (ref 3.7–5.3)
POTASSIUM SERPL-SCNC: 3.9 MMOL/L (ref 3.7–5.3)
POTASSIUM SERPL-SCNC: 3.9 MMOL/L (ref 3.7–5.3)
POTASSIUM SERPL-SCNC: 4.4 MMOL/L (ref 3.7–5.3)
PROTHROMBIN TIME: 25.2 SEC (ref 11.5–14.2)
PROTHROMBIN TIME: 40.3 SEC (ref 11.5–14.2)
SODIUM BLD-SCNC: 130 MMOL/L (ref 135–144)
SODIUM BLD-SCNC: 131 MMOL/L (ref 135–144)
SODIUM BLD-SCNC: 131 MMOL/L (ref 135–144)
SODIUM BLD-SCNC: 132 MMOL/L (ref 135–144)
TOTAL PROTEIN: 6.3 G/DL (ref 6.4–8.3)

## 2021-05-24 PROCEDURE — 2709999900 IR PLACE NG TUBE BY DR W FLUORO

## 2021-05-24 PROCEDURE — C1729 CATH, DRAINAGE: HCPCS

## 2021-05-24 PROCEDURE — 86927 PLASMA FRESH FROZEN: CPT

## 2021-05-24 PROCEDURE — 85014 HEMATOCRIT: CPT

## 2021-05-24 PROCEDURE — 86901 BLOOD TYPING SEROLOGIC RH(D): CPT

## 2021-05-24 PROCEDURE — 99232 SBSQ HOSP IP/OBS MODERATE 35: CPT | Performed by: FAMILY MEDICINE

## 2021-05-24 PROCEDURE — 86900 BLOOD TYPING SEROLOGIC ABO: CPT

## 2021-05-24 PROCEDURE — 85018 HEMOGLOBIN: CPT

## 2021-05-24 PROCEDURE — APPSS30 APP SPLIT SHARED TIME 16-30 MINUTES: Performed by: NURSE PRACTITIONER

## 2021-05-24 PROCEDURE — 6370000000 HC RX 637 (ALT 250 FOR IP): Performed by: NURSE PRACTITIONER

## 2021-05-24 PROCEDURE — 80051 ELECTROLYTE PANEL: CPT

## 2021-05-24 PROCEDURE — 36415 COLL VENOUS BLD VENIPUNCTURE: CPT

## 2021-05-24 PROCEDURE — 86850 RBC ANTIBODY SCREEN: CPT

## 2021-05-24 PROCEDURE — 97535 SELF CARE MNGMENT TRAINING: CPT

## 2021-05-24 PROCEDURE — 0W9G30Z DRAINAGE OF PERITONEAL CAVITY WITH DRAINAGE DEVICE, PERCUTANEOUS APPROACH: ICD-10-PCS | Performed by: RADIOLOGY

## 2021-05-24 PROCEDURE — 6360000002 HC RX W HCPCS: Performed by: NURSE PRACTITIONER

## 2021-05-24 PROCEDURE — 2060000000 HC ICU INTERMEDIATE R&B

## 2021-05-24 PROCEDURE — 82565 ASSAY OF CREATININE: CPT

## 2021-05-24 PROCEDURE — 99232 SBSQ HOSP IP/OBS MODERATE 35: CPT | Performed by: INTERNAL MEDICINE

## 2021-05-24 PROCEDURE — 6370000000 HC RX 637 (ALT 250 FOR IP): Performed by: FAMILY MEDICINE

## 2021-05-24 PROCEDURE — 94761 N-INVAS EAR/PLS OXIMETRY MLT: CPT

## 2021-05-24 PROCEDURE — 36430 TRANSFUSION BLD/BLD COMPNT: CPT

## 2021-05-24 PROCEDURE — 80076 HEPATIC FUNCTION PANEL: CPT

## 2021-05-24 PROCEDURE — 85730 THROMBOPLASTIN TIME PARTIAL: CPT

## 2021-05-24 PROCEDURE — 2580000003 HC RX 258: Performed by: NURSE PRACTITIONER

## 2021-05-24 PROCEDURE — P9017 PLASMA 1 DONOR FRZ W/IN 8 HR: HCPCS

## 2021-05-24 PROCEDURE — 2500000003 HC RX 250 WO HCPCS: Performed by: NURSE PRACTITIONER

## 2021-05-24 PROCEDURE — 43752 NASAL/OROGASTRIC W/TUBE PLMT: CPT | Performed by: RADIOLOGY

## 2021-05-24 PROCEDURE — 84157 ASSAY OF PROTEIN OTHER: CPT

## 2021-05-24 PROCEDURE — 2580000003 HC RX 258: Performed by: FAMILY MEDICINE

## 2021-05-24 PROCEDURE — 49083 ABD PARACENTESIS W/IMAGING: CPT | Performed by: RADIOLOGY

## 2021-05-24 PROCEDURE — 85610 PROTHROMBIN TIME: CPT

## 2021-05-24 PROCEDURE — 94640 AIRWAY INHALATION TREATMENT: CPT

## 2021-05-24 RX ORDER — SODIUM CHLORIDE 9 MG/ML
INJECTION, SOLUTION INTRAVENOUS PRN
Status: DISCONTINUED | OUTPATIENT
Start: 2021-05-24 | End: 2021-05-26 | Stop reason: HOSPADM

## 2021-05-24 RX ADMIN — SODIUM CHLORIDE, PRESERVATIVE FREE 10 ML: 5 INJECTION INTRAVENOUS at 08:52

## 2021-05-24 RX ADMIN — MULTIVITAMIN TABLET 1 TABLET: TABLET at 08:52

## 2021-05-24 RX ADMIN — LACTULOSE 20 G: 20 SOLUTION ORAL at 08:52

## 2021-05-24 RX ADMIN — SODIUM BICARBONATE 650 MG: 650 TABLET ORAL at 14:23

## 2021-05-24 RX ADMIN — SPIRONOLACTONE 25 MG: 25 TABLET ORAL at 08:52

## 2021-05-24 RX ADMIN — PANTOPRAZOLE SODIUM 40 MG: 40 TABLET, DELAYED RELEASE ORAL at 06:06

## 2021-05-24 RX ADMIN — LACTULOSE 20 G: 20 SOLUTION ORAL at 14:23

## 2021-05-24 RX ADMIN — PENTOXIFYLLINE 400 MG: 400 TABLET, EXTENDED RELEASE ORAL at 14:23

## 2021-05-24 RX ADMIN — SODIUM CHLORIDE, PRESERVATIVE FREE 10 ML: 5 INJECTION INTRAVENOUS at 21:33

## 2021-05-24 RX ADMIN — ONDANSETRON 4 MG: 2 INJECTION INTRAMUSCULAR; INTRAVENOUS at 15:16

## 2021-05-24 RX ADMIN — PENTOXIFYLLINE 400 MG: 400 TABLET, EXTENDED RELEASE ORAL at 08:52

## 2021-05-24 RX ADMIN — SODIUM BICARBONATE 650 MG: 650 TABLET ORAL at 21:33

## 2021-05-24 RX ADMIN — FUROSEMIDE 20 MG: 20 TABLET ORAL at 08:52

## 2021-05-24 RX ADMIN — BUDESONIDE AND FORMOTEROL FUMARATE DIHYDRATE 2 PUFF: 160; 4.5 AEROSOL RESPIRATORY (INHALATION) at 09:46

## 2021-05-24 RX ADMIN — FOLIC ACID: 5 INJECTION, SOLUTION INTRAMUSCULAR; INTRAVENOUS; SUBCUTANEOUS at 08:52

## 2021-05-24 RX ADMIN — BUDESONIDE AND FORMOTEROL FUMARATE DIHYDRATE 2 PUFF: 160; 4.5 AEROSOL RESPIRATORY (INHALATION) at 19:27

## 2021-05-24 RX ADMIN — SODIUM BICARBONATE 650 MG: 650 TABLET ORAL at 08:52

## 2021-05-24 ASSESSMENT — PAIN SCALES - GENERAL
PAINLEVEL_OUTOF10: 0
PAINLEVEL_OUTOF10: 4

## 2021-05-24 NOTE — FLOWSHEET NOTE
FFP transfusion #1     05/24/21 1126   Vitals   BP (!) 125/55   Temp 98.2 °F (36.8 °C)   Temp Source Oral   Pulse 115   Resp 20   SpO2 94 %   Charges - Once Per Day (Any Number of Units)   $Blood Administration Y   Transfuse fresh frozen plasma   Patient Observed Initial 15 Min  Yes   Volume 300   Suspected Reaction No     Plasma consent was confirmed   Plasma was dual signed per writer and an additional RN,   Plasma touched vein @ 1050  Vitals stable. Vitals were taken within 30 minutes prior to plasma administration and 15 minutes after start    Patient was observed for first 15 minutes per writer  No reaction noted. Will continue to monitor     FFP transfusion #2       05/24/21 1218   Vitals   /68   Temp 98.2 °F (36.8 °C)   Pulse 100   Resp 20   SpO2 93 %   Transfuse fresh frozen plasma   Suspected Reaction No       Plasma consent was confirmed   Plasma was dual signed per writer and an additional RN,   Plasma touched vein @ 1131  Vitals stable. Vitals were taken within 30 minutes prior to plasma administration and 15 minutes after start    Patient was observed for first 15 minutes per writer  No reaction noted.  Will continue to monitor

## 2021-05-24 NOTE — PROGRESS NOTES
daughter. He is a smoker. History of drug abuse. GI on board. INR elevated, will need reversal prior to paracentesis     Review of Systems:     12 point ROS performed and negative for anything other than what was stated in subjective     Medications: Allergies: Allergies   Allergen Reactions    Pcn [Penicillins] Swelling    Vicodin [Hydrocodone-Acetaminophen] Swelling    Motrin [Ibuprofen] Swelling       Current Meds:   Scheduled Meds:    sodium bicarbonate  650 mg Oral 4x Daily    thiamine and folic acid IVPB   Intravenous Daily    sodium chloride flush  5-40 mL Intravenous 2 times per day    pentoxifylline  400 mg Oral TID WC    [Held by provider] folic acid  1 mg Oral Daily    furosemide  20 mg Oral Daily    lactulose  20 g Oral TID    budesonide-formoterol  2 puff Inhalation BID    multivitamin  1 tablet Oral Daily    nicotine  1 patch Transdermal Q24H    pantoprazole  40 mg Oral QAM AC    spironolactone  25 mg Oral Daily    [Held by provider] vitamin B-1  100 mg Oral Daily    sodium chloride flush  5-40 mL Intravenous 2 times per day     Continuous Infusions:    sodium chloride      sodium chloride       PRN Meds: sodium chloride flush, sodium chloride, LORazepam **OR** LORazepam **OR** LORazepam **OR** LORazepam **OR** LORazepam **OR** LORazepam **OR** LORazepam **OR** LORazepam, oxyCODONE, sodium chloride flush, sodium chloride, potassium chloride, magnesium sulfate, acetaminophen, promethazine **OR** ondansetron, albuterol    Data:     Past Medical History:   has a past medical history of Anxiety, Asthma, Blind left eye, Cervical spine fracture (HCC), COPD (chronic obstructive pulmonary disease) ([facility] Depression, H/O: substance abuse ([facility] Hep C w/o coma, chronic ([facility] Hepatitis C, History of suicidal tendencies, Hx: UTI (urinary tract infection), and Schizophrenia, schizo-affective ([facility] Social History:   reports that he has been smoking cigarettes.  He has a 150.00 --  0.97  --   --   --  <0.40*  --   --   --    MG  --   --   --   --   --  2.2  --   --   --   --   --   --   --    ANIONGAP 11  --   --   --    < > 13 12 14  --  14 14 17 19   LABGLOM >60  --   --   --   --  >60  --   --   --  CANNOT BE CALCULATED  --   --   --    GFRAA >60  --   --   --   --  >60  --   --   --  CANNOT BE CALCULATED  --   --   --    CALCIUM 9.1  --   --   --   --  9.0  --   --   --  8.8  --   --   --    CAION  --   --   --   --   --  1.20  --   --   --   --   --   --   --    PHOS  --   --   --   --   --  4.4  --   --   --   --   --   --   --    TROPHS 18   < > 16 15  --   --  16 20 19  --   --   --   --    MYOGLOBIN 128*  --  116* 100*  --   --   --   --   --   --   --   --   --     < > = values in this interval not displayed. Recent Labs     05/21/21  1348 05/21/21  1823 05/22/21  0459 05/23/21  0524   PROT 6.1*  --  6.0* 5.8*   LABALBU 2.4*  --  2.5* 2.4*   *  --  219* 225*   *  --  168* 173*   ALKPHOS 197*  --  195* 161*   BILITOT 18.39*  --  19.63* 20.75*   AMMONIA  --  27  --   --    AMYLASE  --   --  89  --    LIPASE  --   --  35  --    TRIG  --   --  60  --      ABG:No results found for: POCPH, PHART, PH, POCPCO2, GBX9GAB, PCO2, POCPO2, PO2ART, PO2, POCHCO3, QSQ4VHX, HCO3, NBEA, PBEA, BEART, BE, THGBART, THB, OEF5GCF, ZSIO3XCV, I1VHZBPX, O2SAT, FIO2  Lab Results   Component Value Date/Time    SPECIAL NOT REPORTED 05/16/2021 03:00 PM     Lab Results   Component Value Date/Time    CULTURE NO GROWTH 6 DAYS 05/16/2021 03:00 PM       Radiology:  XR CHEST PORTABLE    Result Date: 5/21/2021  Low lung volumes with bibasilar atelectasis. The lungs are otherwise clear. US ABDOMEN LIMITED Specify organ? LIVER    Result Date: 5/17/2021  1. Nodular cirrhotic liver. 2. Large volume ascites. Mild gallbladder wall thickening attributed to hypoproteinemic state from the cirrhosis and ascites. No cholelithiasis. 3. No sonographic evidence for portal vein thrombosis.   There is normal

## 2021-05-24 NOTE — BRIEF OP NOTE
Brief Postoperative Note    Melissa Juares  YOB: 1955  [id number]    Pre-operative Diagnosis: Ascites; AMS; acute liver failure    Post-operative Diagnosis: Same    Procedure: US guided paracentesis;  Fluoro guided 8 fr enteric/feeding tube placement    Anesthesia: Local    Surgeons/Assistants: Mitch    Estimated Blood Loss: less than 50     Complications: None    Specimens: Was Not Obtained    Findings: Feeding tube terminating in the duodenum    Electronically signed by Juliana Rivers MD on 5/24/2021 at 4:51 PM

## 2021-05-24 NOTE — PROGRESS NOTES
Occupational Therapy  Facility/Department: [facility] PROGRESSIVE CARE  Daily Treatment Note  NAME: Jhoana Dudley  : 1955  MRN: [id number]    Date of Service: 2021    Discharge Recommendations:  Patient would benefit from continued therapy after discharge     Pt currently functioning below baseline. Would suggest additional therapy at time of discharge to maximize long term outcomes and prevent re-admission. Please refer to AM-PAC score for current level of function. Assessment   Performance deficits / Impairments: Decreased functional mobility ; Decreased ADL status; Decreased strength;Decreased safe awareness;Decreased endurance;Decreased balance;Decreased posture  Prognosis: Good  OT Education: OT Role;Plan of Care;Precautions; ADL Adaptive Strategies;Transfer Training;Energy Conservation;Equipment; Family Education  REQUIRES OT FOLLOW UP: Yes  Activity Tolerance  Activity Tolerance: Patient limited by fatigue  Safety Devices  Safety Devices in place: Yes  Type of devices: Call light within reach;Nurse notified;Gait belt;Patient at risk for falls; Chair alarm in place; Left in chair         Patient Diagnosis(es): The encounter diagnosis was Acute liver failure without hepatic coma. has a past medical history of Anxiety, Asthma, Blind left eye, Cervical spine fracture ([facility] COPD (chronic obstructive pulmonary disease) ([facility] Depression, H/O: substance abuse ([facility] Hep C w/o coma, chronic ([facility] Hepatitis C, History of suicidal tendencies, Hx: UTI (urinary tract infection), and Schizophrenia, schizo-affective ([facility] has a past surgical history that includes Umbilical hernia repair; Cataract removal with implant; Vein Surgery; hernia repair; Eye surgery (Left, 13); vitrectomy (Left, 2013); eye surgery (Left, 13); Upper gastrointestinal endoscopy (N/A, 2021); and Colonoscopy (N/A, 2021).     Restrictions  Restrictions/Precautions  Restrictions/Precautions: General Precautions, Fall Risk  Subjective   General  Chart Reviewed: Yes  Patient assessed for rehabilitation services?: Yes  Response to previous treatment: Patient with no complaints from previous session  Family / Caregiver Present: No      Orientation  Orientation  Overall Orientation Status: Within Functional Limits  Objective    ADL  Toileting: Maximum assistance;Minimal assistance (Patient is able to complete BM hygiene, but d/t increased fatigue from sitting on toilet, patient required Max A for cleanliness)  Additional Comments: Patient is limited with decreased standing balance/tolerance, limited with ADL complete and endurance        Balance  Sitting Balance: Stand by assistance  Standing Balance: Minimal assistance  Standing Balance  Time: standing tolerance ~1 minute  Activity: BM hygiene and brief change    Functional Mobility  Functional - Mobility Device: Rolling Walker  Activity: To/from bathroom  Assist Level: Minimal assistance  Functional Mobility Comments: Patient completed functional mobility to/from bathroom with RW and verbal cues for safety and pacing, patient relies heavily on RW and fatigues easily    Toilet Transfers  Toilet - Technique: Ambulating  Equipment Used: Grab bars  Toilet Transfer: Minimal assistance  Toilet Transfers Comments: Patient is impulsive, Min A for safety    Bed mobility  Supine to Sit: Stand by assistance  Scooting: Stand by assistance  Comment: Increased time and effort to complete  Transfers  Sit to stand: Contact guard assistance;Minimal assistance  Stand to sit: Contact guard assistance;Minimal assistance  Transfer Comments: inc. time to complete. Pt fatigues very easily. forward flexed posture, cues to achieve fully upright posture. Patient is impulsive      Cognition  Overall Cognitive Status: Exceptions  Arousal/Alertness: Appropriate responses to stimuli;Delayed responses to stimuli  Following Commands: Follows one step commands with increased time; Follows one step commands with repetition  Attention Span: Attends with cues to redirect  Safety Judgement: Decreased awareness of need for safety;Decreased awareness of need for assistance  Problem Solving: Assistance required to generate solutions;Assistance required to identify errors made;Assistance required to implement solutions;Assistance required to correct errors made;Decreased awareness of errors  Insights: Decreased awareness of deficits  Initiation: Requires cues for all  Sequencing: Requires cues for all      Plan   Plan  Times per week: 4-5x/week, 1-2x/day  Times per day: Daily  Current Treatment Recommendations: Strengthening, Balance Training, Functional Mobility Training, Endurance Training, Safety Education & Training, Self-Care / ADL, Patient/Caregiver Education & Training, Equipment Evaluation, Education, & procurement, Positioning  AM-PAC Score        AM-[facility] Inpatient Daily Activity Raw Score: 16 (05/24/21 1159)  AM-PAC Inpatient ADL T-Scale Score : 35.96 (05/24/21 1159)  ADL Inpatient CMS 0-100% Score: 53.32 (05/24/21 1159)  ADL Inpatient CMS G-Code Modifier : CK (05/24/21 1159)    Goals  Short term goals  Time Frame for Short term goals: by discharge, pt will  Short term goal 1: demo S/SBA with ADL transfers with good safety/pacing and AD/DME as needed  Short term goal 2: demo S/SBA with functional mob in room with good safety/pacing and DME/AD as needed  Short term goal 3: demo S/SBA with toileting routine with good safety/pacing  Short term goal 4: demo S/SBA with UB/LB ADLs with good safety/pacing, DME as needed  Short term goal 5: demo and verb good understanding of fall prevention techs, EC/WS techs, equip needs, d/c recommendations, and B UE HEP  Patient Goals   Patient goals : to feel better, have pain subside       Therapy Time   Individual Concurrent Group Co-treatment   Time In 1147         Time Out 1215         Minutes 28           Upon writer exit, call light within reach, pt retired to chair.  All lines intact and patient positioned comfortably. Chair alarm in place. All patient needs addressed prior to ending therapy session. Chart reviewed prior to treatment and patient is agreeable for therapy. RN reports patient is medically stable for therapy treatment this date.       MERE Peralta/TARAS

## 2021-05-24 NOTE — PLAN OF CARE
Problem: Falls - Risk of:  Goal: Will remain free from falls  Description: Will remain free from falls  5/24/2021 1106 by Jennyfer Mcintosh RN  Outcome: Ongoing  Siderails up x 2  Hourly rounding. Call light in reach. Instructed to call for assist before attempting out of bed. Remains free from falls and accidental injury at this time. Floor free from obstacles, and bed is locked and in lowest position. Adequate lighting provided. Bed alarm on. Fall sticker on wristband.  Fall Sign posted in doorway       Problem: Falls - Risk of:  Goal: Absence of physical injury  Description: Absence of physical injury  Outcome: Ongoing     Problem: Skin Integrity:  Goal: Will show no infection signs and symptoms  Description: Will show no infection signs and symptoms  5/24/2021 1106 by Jennyfer Mcintosh RN  Outcome: Ongoing  Patient able to turn self      Problem: Skin Integrity:  Goal: Absence of new skin breakdown  Description: Absence of new skin breakdown  Outcome: Ongoing     Problem: Pain:  Goal: Pain level will decrease  Description: Pain level will decrease  5/24/2021 1106 by Jennyfer Mcintosh RN  Outcome: Ongoing     Problem: Pain:  Goal: Control of acute pain  Description: Control of acute pain  Outcome: Ongoing     Problem: Pain:  Goal: Control of chronic pain  Description: Control of chronic pain  Outcome: Ongoing

## 2021-05-24 NOTE — PROGRESS NOTES
IR will not do paracentesis tomorrow unless INR is less than 3. Current INR is 3.8.  Dr. Perdomo Finders notified, no new orders at this time

## 2021-05-24 NOTE — PROGRESS NOTES
[facility]    Gastroenterology Daily Progress Note      Patient:   Mark Alvarado   :    1955   Facility:   [facility]  Date:     2021  Consultant:   PEEWEE Riley CNP, CNP      SUBJECTIVE  77 y.o. male admitted 2021 with Abdominal pain [R10.9] and seen for cirrhosis likely alcoholic hepatitis. The pt was seen and examined. lft's continue to rise. Pt inr 4.4 today and has received FFP in order for paracentesis and NJ tube to be completed. Had non blood BM today per RN. No c/o abdominal pain but is nauseated. Has been restless at times per family.          OBJECTIVE  Scheduled Meds:   sodium bicarbonate  650 mg Oral 4x Daily    thiamine and folic acid IVPB   Intravenous Daily    sodium chloride flush  5-40 mL Intravenous 2 times per day    pentoxifylline  400 mg Oral TID WC    [Held by provider] folic acid  1 mg Oral Daily    furosemide  20 mg Oral Daily    lactulose  20 g Oral TID    budesonide-formoterol  2 puff Inhalation BID    multivitamin  1 tablet Oral Daily    nicotine  1 patch Transdermal Q24H    pantoprazole  40 mg Oral QAM AC    spironolactone  25 mg Oral Daily    [Held by provider] vitamin B-1  100 mg Oral Daily    sodium chloride flush  5-40 mL Intravenous 2 times per day       Vital Signs:  /68   Pulse 100   Temp 98.2 °F (36.8 °C)   Resp 20   Ht 6' 1\" (1.854 m)   Wt 180 lb (81.6 kg)   SpO2 93%   BMI 23.75 kg/m²      Physical Exam:       General Appearance: alert and oriented to person, place and time, well-developed and mal-nourished, in no acute distress  Skin: warm and dry, no rash or erythema has jandice  Head: normocephalic and atraumatic  Eyes: pupils equal, round, and reactive to light, extraocular eye movements intact, conjunctivae icteric  ENT: hearing grossly normal bilaterally  Neck: neck supple and non tender without mass, no thyromegaly or thyroid nodules, no cervical lymphadenopathy   Pulmonary/Chest: clear to auscultation bilaterally- no wheezes, rales or rhonchi, normal air movement, no respiratory distress  Cardiovascular: tachycardic rate, regular rhythm, normal S1 and S2, no murmurs, rubs, clicks or gallops, distal pulses intact, no carotid bruits  Abdomen: soft, non-tender, distended, normal bowel sounds, no masses or organomegaly  Extremities: no cyanosis, clubbing or edema  Musculoskeletal: normal range of motion, no joint swelling, deformity or tenderness  Neurologic: no cranial nerve deficit and muscle strength normal      Lab and Imaging Review     CBC  Recent Labs     05/22/21 0459 05/24/21  0042 05/24/21  0933 05/24/21  1200   WBC 10.1  --   --   --    HGB 13.2 14.4 14.7 14.3   HCT 38.1* 41.5 42.5 42.0   MCV 97.4  --   --   --    PLT 76*  --   --   --        BMP  Recent Labs     05/22/21 0459 05/23/21 0524 05/24/21  0042 05/24/21  0611 05/24/21  0933   * 129* 131* 132* 131*   K 3.9 5.5* 3.7 3.9 4.4   CL 96* 96* 96* 97* 95*   CO2 20 19* 18* 16* 18*   BUN 32* 35*  --   --   --    CREATININE 0.97 <0.40*  --   --  <0.40*   GLUCOSE 87 103*  --   --   --    CALCIUM 9.0 8.8  --   --   --        LFTS  Recent Labs     05/22/21 0459 05/23/21  0524 05/24/21  0933   ALKPHOS 195* 161* 194*   * 173* 194*   * 225* 267*   PROT 6.0* 5.8* 6.3*   BILITOT 19.63* 20.75* 21.30*   BILIDIR  --   --  14.00*   LABALBU 2.5* 2.4* 2.7*       AMYLASE/LIPASE/AMMONIA  Recent Labs     05/21/21  1823 05/22/21 0459   AMYLASE  --  89   LIPASE  --  35   AMMONIA 27  --        PT/INR  Recent Labs     05/22/21 0459 05/23/21  1632 05/24/21  0611   PROTIME 35.4* 36.4* 40.3*   INR 3.7 3.8 4.4     US ABDOMEN LIMITED Specify organ? LIVER     Result Date: 5/17/2021  EXAMINATION: RIGHT UPPER QUADRANT ULTRASOUND 5/17/2021 9:20 am COMPARISON: CT 05/16/2021 HISTORY: ORDERING SYSTEM PROVIDED HISTORY: to rule thromobosis, cirrhosis ? TECHNOLOGIST PROVIDED HISTORY: to rule thromobosis, cirrhosis ?  Specify organ?->LIVER FINDINGS: LIVER:  Nodular cirrhotic liver.  Liver length 14 cm.  No focal liver lesion is demonstrated.  A less than 5 mm hypodense liver lesion reported on the previous CT scan is not demonstrated on this exam.  MR imaging may be considered. BILIARY SYSTEM: Alice Copper is no intrahepatic biliary ductal dilatation.  The common bile duct is not dilated. Measures 2-3 mm. RIGHT KIDNEY: The right kidney is grossly unremarkable without evidence of hydronephrosis. PANCREAS:  Visualized portions of the pancreas are unremarkable. OTHER: Color and spectral Doppler imaging was performed.  Normal waveform patterns noted in the main portal as well as left right portal vein branches. Peak systolic velocities are low.  Peak systolic velocity in the main portal vein is 7.9 cm/S.  The hepatic veins also patent.  No sonographic evidence for portal vein thrombosis. Alice Copper is hepatic pedal flow in the portal vein. Large volume ascites.      1. Nodular cirrhotic liver. 2. Large volume ascites.  Mild gallbladder wall thickening attributed to hypoproteinemic state from the cirrhosis and ascites.  No cholelithiasis. 3. No sonographic evidence for portal vein thrombosis.  There is normal directional flow in the portal vein. 4. The less than 5 mm hypodense lesion reported on prior CT scan is not sonographically apparent.  Consider further imaging with MRI to definitively exclude the lesion.      CT CHEST ABDOMEN PELVIS W CONTRAST     Result Date: 5/16/2021  EXAMINATION: CT OF THE CHEST, ABDOMEN, AND PELVIS WITH CONTRAST 5/16/2021 12:34 pm TECHNIQUE: CT of the chest, abdomen and pelvis was performed with the administration of intravenous contrast. Multiplanar reformatted images are provided for review. Dose modulation, iterative reconstruction, and/or weight based adjustment of the mA/kV was utilized to reduce the radiation dose to as low as reasonably achievable.  COMPARISON: 05/05/2019 HISTORY: ORDERING SYSTEM PROVIDED HISTORY: new sob, evidence of liver hypoattenuating liver lesion in the central left hepatic lobe is too small to characterize.  Attention to on follow-up is recommended. 3.  Moderate volume abdominopelvic ascites. 4.  Edematous appearance of the proximal colon, favored to represent portal hypertensive colopathy versus inflammatory colitis.         ASSESSMENT/plan  1. Decompensated Cirrhosis with component of alcoholic hepatitis  -case d/w dr Purnima Stanford, will continue the pentoxifylline and trend lft;s.   -recheck inr and to have paracentesis and NJ tube placed with IR hopefully today  -continue the lactulose  -trend INR and watch for any sign of bleeding  -continue diuretics and monitor renal function      2.elevated afp and possible liver lesion,   -eventual mri with liver lesion protocol  -hepatitis c viral load is pending      This plan was formulated in collaboration with Dr. Purnima Stanford .     Electronically signed by: PEEWEE Faulkner CNP on 5/24/2021 at 2:25 PM

## 2021-05-24 NOTE — PLAN OF CARE
Problem: Falls - Risk of:  Goal: Will remain free from falls  Description: Will remain free from falls  5/24/2021 0004 by Tanya Wade RN  Outcome: Ongoing  Note: Fall risk assessment completed. Patient instructed to use call light. Bed locked and in lowest position, side rails up 2/4, call light and bedside table within reach, clutter removed, and non-skid footwear on when pt out of bed. Hourly rounds will continue. Bed alarm on at this time     Problem: Skin Integrity:  Goal: Will show no infection signs and symptoms  Description: Will show no infection signs and symptoms  Outcome: Ongoing     Problem: Pain:  Goal: Pain level will decrease  Description: Pain level will decrease  Outcome: Ongoing  Note: Pain assessment completed. Patient demonstrates understanding of pain rating scale and interventions. At this time patient states is controlled    Will continue to monitor and reassess with each rounding and PRN.

## 2021-05-24 NOTE — PROGRESS NOTES
Weight:  ; No Adjustment   · BMI Categories: Normal Weight (BMI 22.0 to 24.9) age over 72       Nutrition Diagnosis:   · Inadequate protein-energy intake related to inadequate protein-energy intake (Alcohol intake) as evidenced by poor intake prior to admission, intake 0-25%      Nutrition Interventions:   Food and/or Nutrient Delivery:  Continue Current Diet, Continue Oral Nutrition Supplement  Nutrition Education/Counseling:  Education not indicated   Coordination of Nutrition Care:  Continue to monitor while inpatient    Goals:  p.o intake >25% of meals or start nutrition support       Nutrition Monitoring and Evaluation:   Behavioral-Environmental Outcomes:  None Identified   Food/Nutrient Intake Outcomes:  Food and Nutrient Intake, Supplement Intake  Physical Signs/Symptoms Outcomes:  Biochemical Data, GI Status, Fluid Status or Edema, Weight     Discharge Planning:     Too soon to determine     Trina George, [address] [place] [address], [address] [place] [address]  Office Number: [phone]

## 2021-05-24 NOTE — PLAN OF CARE
Nutrition Problem #1: Inadequate protein-energy intake  Intervention: Food and/or Nutrient Delivery: Continue Current Diet, Continue Oral Nutrition Supplement  Nutritional Goals: p.o intake >25% of meals or start nutrition support

## 2021-05-25 ENCOUNTER — APPOINTMENT (OUTPATIENT)
Dept: GENERAL RADIOLOGY | Age: 66
DRG: 280 | End: 2021-05-25
Payer: MEDICARE

## 2021-05-25 LAB
-: ABNORMAL
ABSOLUTE EOS #: 0 K/UL (ref 0–0.44)
ABSOLUTE IMMATURE GRANULOCYTE: 0.09 K/UL (ref 0–0.3)
ABSOLUTE LYMPH #: 0.56 K/UL (ref 1.1–3.7)
ABSOLUTE MONO #: 1.02 K/UL (ref 0.1–1.2)
ALBUMIN SERPL-MCNC: 2.9 G/DL (ref 3.5–5.2)
ALBUMIN/GLOBULIN RATIO: ABNORMAL (ref 1–2.5)
ALP BLD-CCNC: 181 U/L (ref 40–129)
ALT SERPL-CCNC: 188 U/L (ref 5–41)
AMMONIA: 73 UMOL/L (ref 16–60)
AMORPHOUS: ABNORMAL
ANION GAP SERPL CALCULATED.3IONS-SCNC: 14 MMOL/L (ref 9–17)
ANION GAP SERPL CALCULATED.3IONS-SCNC: 15 MMOL/L (ref 9–17)
ANION GAP SERPL CALCULATED.3IONS-SCNC: 15 MMOL/L (ref 9–17)
ANION GAP SERPL CALCULATED.3IONS-SCNC: 16 MMOL/L (ref 9–17)
AST SERPL-CCNC: 317 U/L
BACTERIA: ABNORMAL
BASOPHILS # BLD: 0 % (ref 0–2)
BASOPHILS ABSOLUTE: 0 K/UL (ref 0–0.2)
BILIRUB SERPL-MCNC: 19.2 MG/DL (ref 0.3–1.2)
BILIRUBIN DIRECT: 13.71 MG/DL
BILIRUBIN URINE: ABNORMAL
BILIRUBIN, INDIRECT: 5.49 MG/DL (ref 0–1)
BLD PROD TYP BPU: NORMAL
BLD PROD TYP BPU: NORMAL
CASTS UA: ABNORMAL /LPF
CHLORIDE BLD-SCNC: 94 MMOL/L (ref 98–107)
CHLORIDE BLD-SCNC: 95 MMOL/L (ref 98–107)
CHLORIDE BLD-SCNC: 95 MMOL/L (ref 98–107)
CHLORIDE BLD-SCNC: 96 MMOL/L (ref 98–107)
CO2: 19 MMOL/L (ref 20–31)
CO2: 21 MMOL/L (ref 20–31)
CO2: 22 MMOL/L (ref 20–31)
CO2: 23 MMOL/L (ref 20–31)
COLOR: ABNORMAL
COMMENT UA: ABNORMAL
CRYSTALS, UA: ABNORMAL /HPF
DIFFERENTIAL TYPE: ABNORMAL
DISPENSE STATUS BLOOD BANK: NORMAL
DISPENSE STATUS BLOOD BANK: NORMAL
EKG ATRIAL RATE: 84 BPM
EKG P AXIS: 53 DEGREES
EKG P-R INTERVAL: 156 MS
EKG Q-T INTERVAL: 372 MS
EKG QRS DURATION: 84 MS
EKG QTC CALCULATION (BAZETT): 439 MS
EKG R AXIS: 8 DEGREES
EKG T AXIS: 66 DEGREES
EKG VENTRICULAR RATE: 84 BPM
EOSINOPHILS RELATIVE PERCENT: 0 % (ref 1–4)
EPITHELIAL CELLS UA: ABNORMAL /HPF (ref 0–5)
GLOBULIN: ABNORMAL G/DL (ref 1.5–3.8)
GLUCOSE URINE: ABNORMAL
HCT VFR BLD CALC: 37.4 % (ref 40.7–50.3)
HCT VFR BLD CALC: 37.9 % (ref 40.7–50.3)
HCT VFR BLD CALC: 38.2 % (ref 40.7–50.3)
HCT VFR BLD CALC: 38.4 % (ref 40.7–50.3)
HCT VFR BLD CALC: 39.9 % (ref 40.7–50.3)
HEMOGLOBIN: 12.9 G/DL (ref 13–17)
HEMOGLOBIN: 13.3 G/DL (ref 13–17)
HEMOGLOBIN: 13.4 G/DL (ref 13–17)
HEMOGLOBIN: 13.4 G/DL (ref 13–17)
HEMOGLOBIN: 13.9 G/DL (ref 13–17)
IMMATURE GRANULOCYTES: 1 %
INR BLD: 3.3
KETONES, URINE: ABNORMAL
LEUKOCYTE ESTERASE, URINE: NEGATIVE
LYMPHOCYTES # BLD: 6 % (ref 24–43)
MCH RBC QN AUTO: 33.5 PG (ref 25.2–33.5)
MCHC RBC AUTO-ENTMCNC: 34.9 G/DL (ref 28.4–34.8)
MCV RBC AUTO: 96 FL (ref 82.6–102.9)
MONOCYTES # BLD: 11 % (ref 3–12)
MUCUS: ABNORMAL
NITRITE, URINE: NEGATIVE
NRBC AUTOMATED: 0.2 PER 100 WBC
OTHER OBSERVATIONS UA: ABNORMAL
PDW BLD-RTO: 16.2 % (ref 11.8–14.4)
PH UA: 5.5 (ref 5–8)
PLATELET # BLD: 61 K/UL (ref 138–453)
PLATELET ESTIMATE: ABNORMAL
PMV BLD AUTO: 11.7 FL (ref 8.1–13.5)
POTASSIUM SERPL-SCNC: 3.7 MMOL/L (ref 3.7–5.3)
POTASSIUM SERPL-SCNC: 4 MMOL/L (ref 3.7–5.3)
POTASSIUM SERPL-SCNC: 4 MMOL/L (ref 3.7–5.3)
POTASSIUM SERPL-SCNC: 4.1 MMOL/L (ref 3.7–5.3)
PROTEIN UA: NEGATIVE
PROTHROMBIN TIME: 32.6 SEC (ref 11.5–14.2)
RBC # BLD: 4 M/UL (ref 4.21–5.77)
RBC # BLD: ABNORMAL 10*6/UL
RBC UA: ABNORMAL /HPF (ref 0–2)
RENAL EPITHELIAL, UA: ABNORMAL /HPF
SEG NEUTROPHILS: 82 % (ref 36–65)
SEGMENTED NEUTROPHILS ABSOLUTE COUNT: 7.63 K/UL (ref 1.5–8.1)
SODIUM BLD-SCNC: 127 MMOL/L (ref 135–144)
SODIUM BLD-SCNC: 132 MMOL/L (ref 135–144)
SODIUM BLD-SCNC: 133 MMOL/L (ref 135–144)
SODIUM BLD-SCNC: 133 MMOL/L (ref 135–144)
SPECIFIC GRAVITY UA: 1.02 (ref 1–1.03)
TOTAL PROTEIN: 6.2 G/DL (ref 6.4–8.3)
TRANSFUSION STATUS: NORMAL
TRANSFUSION STATUS: NORMAL
TRICHOMONAS: ABNORMAL
TURBIDITY: ABNORMAL
UNIT DIVISION: 0
UNIT DIVISION: 0
UNIT NUMBER: NORMAL
UNIT NUMBER: NORMAL
URINE HGB: ABNORMAL
UROBILINOGEN, URINE: ABNORMAL
WBC # BLD: 9.3 K/UL (ref 3.5–11.3)
WBC # BLD: ABNORMAL 10*3/UL
WBC UA: ABNORMAL /HPF (ref 0–5)
YEAST: ABNORMAL

## 2021-05-25 PROCEDURE — 6360000002 HC RX W HCPCS: Performed by: INTERNAL MEDICINE

## 2021-05-25 PROCEDURE — 94761 N-INVAS EAR/PLS OXIMETRY MLT: CPT

## 2021-05-25 PROCEDURE — 51701 INSERT BLADDER CATHETER: CPT

## 2021-05-25 PROCEDURE — 74018 RADEX ABDOMEN 1 VIEW: CPT

## 2021-05-25 PROCEDURE — 36415 COLL VENOUS BLD VENIPUNCTURE: CPT

## 2021-05-25 PROCEDURE — 2060000000 HC ICU INTERMEDIATE R&B

## 2021-05-25 PROCEDURE — 85014 HEMATOCRIT: CPT

## 2021-05-25 PROCEDURE — 51798 US URINE CAPACITY MEASURE: CPT

## 2021-05-25 PROCEDURE — 97112 NEUROMUSCULAR REEDUCATION: CPT

## 2021-05-25 PROCEDURE — 6370000000 HC RX 637 (ALT 250 FOR IP): Performed by: NURSE PRACTITIONER

## 2021-05-25 PROCEDURE — 82140 ASSAY OF AMMONIA: CPT

## 2021-05-25 PROCEDURE — 2580000003 HC RX 258: Performed by: FAMILY MEDICINE

## 2021-05-25 PROCEDURE — 6360000002 HC RX W HCPCS: Performed by: NURSE PRACTITIONER

## 2021-05-25 PROCEDURE — 88305 TISSUE EXAM BY PATHOLOGIST: CPT

## 2021-05-25 PROCEDURE — 99233 SBSQ HOSP IP/OBS HIGH 50: CPT | Performed by: INTERNAL MEDICINE

## 2021-05-25 PROCEDURE — 93010 ELECTROCARDIOGRAM REPORT: CPT | Performed by: INTERNAL MEDICINE

## 2021-05-25 PROCEDURE — 97530 THERAPEUTIC ACTIVITIES: CPT

## 2021-05-25 PROCEDURE — 80076 HEPATIC FUNCTION PANEL: CPT

## 2021-05-25 PROCEDURE — 80051 ELECTROLYTE PANEL: CPT

## 2021-05-25 PROCEDURE — 51702 INSERT TEMP BLADDER CATH: CPT

## 2021-05-25 PROCEDURE — 94640 AIRWAY INHALATION TREATMENT: CPT

## 2021-05-25 PROCEDURE — 2500000003 HC RX 250 WO HCPCS: Performed by: NURSE PRACTITIONER

## 2021-05-25 PROCEDURE — 2580000003 HC RX 258: Performed by: INTERNAL MEDICINE

## 2021-05-25 PROCEDURE — 2580000003 HC RX 258: Performed by: NURSE PRACTITIONER

## 2021-05-25 PROCEDURE — 97116 GAIT TRAINING THERAPY: CPT

## 2021-05-25 PROCEDURE — APPSS30 APP SPLIT SHARED TIME 16-30 MINUTES: Performed by: NURSE PRACTITIONER

## 2021-05-25 PROCEDURE — 81001 URINALYSIS AUTO W/SCOPE: CPT

## 2021-05-25 PROCEDURE — 85610 PROTHROMBIN TIME: CPT

## 2021-05-25 PROCEDURE — 88112 CYTOPATH CELL ENHANCE TECH: CPT

## 2021-05-25 PROCEDURE — 6370000000 HC RX 637 (ALT 250 FOR IP): Performed by: FAMILY MEDICINE

## 2021-05-25 PROCEDURE — 85025 COMPLETE CBC W/AUTO DIFF WBC: CPT

## 2021-05-25 PROCEDURE — 85018 HEMOGLOBIN: CPT

## 2021-05-25 RX ORDER — CIPROFLOXACIN 2 MG/ML
400 INJECTION, SOLUTION INTRAVENOUS ONCE
Status: COMPLETED | OUTPATIENT
Start: 2021-05-25 | End: 2021-05-25

## 2021-05-25 RX ORDER — LACTULOSE 10 G/15ML
20 SOLUTION ORAL 4 TIMES DAILY
Status: DISCONTINUED | OUTPATIENT
Start: 2021-05-25 | End: 2021-05-26 | Stop reason: HOSPADM

## 2021-05-25 RX ORDER — SODIUM CHLORIDE 9 MG/ML
INJECTION, SOLUTION INTRAVENOUS CONTINUOUS
Status: DISCONTINUED | OUTPATIENT
Start: 2021-05-25 | End: 2021-05-26 | Stop reason: HOSPADM

## 2021-05-25 RX ORDER — BISACODYL 10 MG
10 SUPPOSITORY, RECTAL RECTAL DAILY PRN
Status: DISCONTINUED | OUTPATIENT
Start: 2021-05-25 | End: 2021-05-26 | Stop reason: HOSPADM

## 2021-05-25 RX ORDER — MORPHINE SULFATE 2 MG/ML
1 INJECTION, SOLUTION INTRAMUSCULAR; INTRAVENOUS EVERY 4 HOURS PRN
Status: DISCONTINUED | OUTPATIENT
Start: 2021-05-25 | End: 2021-05-26

## 2021-05-25 RX ADMIN — SODIUM CHLORIDE: 9 INJECTION, SOLUTION INTRAVENOUS at 18:42

## 2021-05-25 RX ADMIN — LACTULOSE 20 G: 20 SOLUTION ORAL at 21:21

## 2021-05-25 RX ADMIN — LACTULOSE 20 G: 20 SOLUTION ORAL at 13:11

## 2021-05-25 RX ADMIN — PANTOPRAZOLE SODIUM 40 MG: 40 TABLET, DELAYED RELEASE ORAL at 05:08

## 2021-05-25 RX ADMIN — SODIUM BICARBONATE 650 MG: 650 TABLET ORAL at 16:49

## 2021-05-25 RX ADMIN — BUDESONIDE AND FORMOTEROL FUMARATE DIHYDRATE 2 PUFF: 160; 4.5 AEROSOL RESPIRATORY (INHALATION) at 20:00

## 2021-05-25 RX ADMIN — PENTOXIFYLLINE 400 MG: 400 TABLET, EXTENDED RELEASE ORAL at 13:11

## 2021-05-25 RX ADMIN — SODIUM CHLORIDE, PRESERVATIVE FREE 10 ML: 5 INJECTION INTRAVENOUS at 08:49

## 2021-05-25 RX ADMIN — SPIRONOLACTONE 25 MG: 25 TABLET ORAL at 08:49

## 2021-05-25 RX ADMIN — MORPHINE SULFATE 1 MG: 2 INJECTION, SOLUTION INTRAMUSCULAR; INTRAVENOUS at 16:49

## 2021-05-25 RX ADMIN — SODIUM BICARBONATE 650 MG: 650 TABLET ORAL at 13:11

## 2021-05-25 RX ADMIN — BUDESONIDE AND FORMOTEROL FUMARATE DIHYDRATE 2 PUFF: 160; 4.5 AEROSOL RESPIRATORY (INHALATION) at 09:12

## 2021-05-25 RX ADMIN — ONDANSETRON 4 MG: 2 INJECTION INTRAMUSCULAR; INTRAVENOUS at 10:20

## 2021-05-25 RX ADMIN — SODIUM BICARBONATE 650 MG: 650 TABLET ORAL at 08:49

## 2021-05-25 RX ADMIN — PENTOXIFYLLINE 400 MG: 400 TABLET, EXTENDED RELEASE ORAL at 16:49

## 2021-05-25 RX ADMIN — OXYCODONE 5 MG: 5 TABLET ORAL at 13:11

## 2021-05-25 RX ADMIN — FUROSEMIDE 20 MG: 20 TABLET ORAL at 08:49

## 2021-05-25 RX ADMIN — LACTULOSE 20 G: 20 SOLUTION ORAL at 08:47

## 2021-05-25 RX ADMIN — FOLIC ACID: 5 INJECTION, SOLUTION INTRAMUSCULAR; INTRAVENOUS; SUBCUTANEOUS at 08:49

## 2021-05-25 RX ADMIN — MULTIVITAMIN TABLET 1 TABLET: TABLET at 08:49

## 2021-05-25 RX ADMIN — PENTOXIFYLLINE 400 MG: 400 TABLET, EXTENDED RELEASE ORAL at 08:48

## 2021-05-25 RX ADMIN — SODIUM CHLORIDE, PRESERVATIVE FREE 10 ML: 5 INJECTION INTRAVENOUS at 21:21

## 2021-05-25 RX ADMIN — RIFAXIMIN 550 MG: 550 TABLET ORAL at 21:21

## 2021-05-25 RX ADMIN — CIPROFLOXACIN 400 MG: 2 INJECTION, SOLUTION INTRAVENOUS at 10:20

## 2021-05-25 RX ADMIN — SODIUM BICARBONATE 650 MG: 650 TABLET ORAL at 21:21

## 2021-05-25 ASSESSMENT — PAIN DESCRIPTION - FREQUENCY
FREQUENCY: INTERMITTENT
FREQUENCY: CONTINUOUS

## 2021-05-25 ASSESSMENT — PAIN DESCRIPTION - PAIN TYPE
TYPE: CHRONIC PAIN
TYPE: CHRONIC PAIN

## 2021-05-25 ASSESSMENT — PAIN SCALES - GENERAL
PAINLEVEL_OUTOF10: 0
PAINLEVEL_OUTOF10: 8
PAINLEVEL_OUTOF10: 0
PAINLEVEL_OUTOF10: 8
PAINLEVEL_OUTOF10: 0
PAINLEVEL_OUTOF10: 0

## 2021-05-25 ASSESSMENT — PAIN DESCRIPTION - LOCATION
LOCATION: GENERALIZED;ABDOMEN
LOCATION: GENERALIZED

## 2021-05-25 NOTE — PROGRESS NOTES
[facility]    Gastroenterology Daily Progress Note      Patient:   Alona Rivas   :    1955   Facility:   [facility]  Date:     2021  Consultant:   PEEWEE Santa CNP, CNP      SUBJECTIVE  77 y.o. male admitted 2021 with Abdominal pain [R10.9] and seen for cirrhosis and likely alcoholic hepatitis. The pt was seen and examined. Pt pulled out NJ tube overnight, per RN IR refusing to replace it. He has had several ensures today but still with low oral intake. Remains restless, had two non bloody BM with the lactulose. lft's slightly down. kub shows retained stool. Paracentesis yesterday no labs are resulted yet.          OBJECTIVE  Scheduled Meds:   lactulose  20 g Oral 4x Daily    sodium bicarbonate  650 mg Oral 4x Daily    thiamine and folic acid IVPB   Intravenous Daily    sodium chloride flush  5-40 mL Intravenous 2 times per day    pentoxifylline  400 mg Oral TID WC    [Held by provider] folic acid  1 mg Oral Daily    furosemide  20 mg Oral Daily    budesonide-formoterol  2 puff Inhalation BID    multivitamin  1 tablet Oral Daily    nicotine  1 patch Transdermal Q24H    pantoprazole  40 mg Oral QAM AC    spironolactone  25 mg Oral Daily    [Held by provider] vitamin B-1  100 mg Oral Daily       Vital Signs:  /73   Pulse 99   Temp 97.6 °F (36.4 °C) (Oral)   Resp 18   Ht 6' 1\" (1.854 m)   Wt 180 lb (81.6 kg)   SpO2 92%   BMI 23.75 kg/m²      Physical Exam:     General Appearance: alert and oriented to person, place and time, well-developed and mal-nourished, pt is restless in bed  Skin: warm and dry, no rash or erythema has jandice  Head: normocephalic and atraumatic  Eyes: pupils equal, round, and reactive to light, extraocular eye movements intact, conjunctivae icteric  ENT: hearing grossly normal bilaterally  Neck: neck supple and non tender without mass, no thyromegaly or thyroid nodules, no cervical lymphadenopathy velocity in the main portal vein is 7.9 cm/S.  The hepatic veins also patent.  No sonographic evidence for portal vein thrombosis. Jamie Raja is hepatic pedal flow in the portal vein. Large volume ascites.      1. Nodular cirrhotic liver. 2. Large volume ascites.  Mild gallbladder wall thickening attributed to hypoproteinemic state from the cirrhosis and ascites.  No cholelithiasis. 3. No sonographic evidence for portal vein thrombosis.  There is normal directional flow in the portal vein. 4. The less than 5 mm hypodense lesion reported on prior CT scan is not sonographically apparent.  Consider further imaging with MRI to definitively exclude the lesion.      CT CHEST ABDOMEN PELVIS W CONTRAST     Result Date: 5/16/2021  EXAMINATION: CT OF THE CHEST, ABDOMEN, AND PELVIS WITH CONTRAST 5/16/2021 12:34 pm TECHNIQUE: CT of the chest, abdomen and pelvis was performed with the administration of intravenous contrast. Multiplanar reformatted images are provided for review. Dose modulation, iterative reconstruction, and/or weight based adjustment of the mA/kV was utilized to reduce the radiation dose to as low as reasonably achievable. COMPARISON: 05/05/2019 HISTORY: ORDERING SYSTEM PROVIDED HISTORY: new sob, evidence of liver failure, rectal bleeding abdominal pain, bloating TECHNOLOGIST PROVIDED HISTORY: new sob, evidence of liver failure, rectal bleeding abdominal pain, bloating Decision Support Exception - unselect if not a suspected or confirmed emergency medical condition->Emergency Medical Condition (MA) Reason for Exam: bloated, abdominal pain , nausea Acuity: Unknown Type of Exam: Unknown FINDINGS: Chest: Mediastinum: The heart and great vessels are normal in size.  Calcified atheromatous plaque and coronary calcifications are noted.  No pericardial effusion.  No enlarged or suspicious-appearing lymph nodes are identified. Lungs/pleura: Paraseptal and centrilobular emphysematous disease.  Subsegmental atelectasis or scarring in the lung bases.  No consolidation. No evidence for edema.  No effusion.  The central airway is patent. Soft Tissues/Bones: No acute findings. Abdomen/Pelvis: Organs: The liver is small with morphologic findings of cirrhosis.  Tiny less than 5 mm hypoattenuating liver lesion in the dome of segment 4 is too small to characterize.  This is not clearly demonstrated on the prior exam, which may have been due to the absence of contrast.  The spleen is normal in size. The portal venous system is patent. The gallbladder and biliary tree reveal no acute findings.  The pancreas, adrenals and kidneys reveal no acute findings. GI/Bowel: No evidence for bowel obstruction.  Edematous appearance of the proximal colon is noted, which may be related to portal hypertensive colopathy.  Normal appendix. Pelvis: No acute findings. Peritoneum/Retroperitoneum: Moderate volume abdominopelvic ascites.  No loculated fluid collection.  No free air.  The aorta is normal in caliber. Bones/Soft Tissues: No acute osseous abnormality identified.  Advanced disc disease at L5-S1.      1.  Emphysema and subsegmental atelectasis or scarring in the lung bases. 2.  Morphologic findings of cirrhosis are demonstrated.  Less than 5 mm hypoattenuating liver lesion in the central left hepatic lobe is too small to characterize.  Attention to on follow-up is recommended. 3.  Moderate volume abdominopelvic ascites. 4.  Edematous appearance of the proximal colon, favored to represent portal hypertensive colopathy versus inflammatory colitis.         ASSESSMENT/plan  1.  Decompensated Cirrhosis with likely  component of alcoholic hepatitis  -case d/w dr Yokasta Cárdenas, will continue the pentoxifylline and trend lft;  -will increase lactulose to 4 times per day and add xifaxin  -continue the pentoxifylline  -pt is not a candidate for a peg tube due to ascites  -needs 4 cans of ensure per day and food if the the [address] [place] [address] is not to be replaced  -continue diuretics and monitor renal function and inr  -avoid any sedatives sleeping pills and narcotics as these will contribute to encephalopathy  -all of the above was discussed with the pt and his family    2.elevated afp and possible liver lesion,   -eventual mri with liver lesion protocol  -hepatitis c viral load is pending       This plan was formulated in collaboration with Dr. Purnima Stanford.     Electronically signed by: PEEWEE Faulkner CNP on 5/25/2021 at 6:21 PM

## 2021-05-25 NOTE — PROGRESS NOTES
Dr. Bulah Skiff notified that patient pulled out his flexiflo tube during the night. Dr. Bulah Skiff asked that IR be reconsulted to re-insert the flexiflo.

## 2021-05-25 NOTE — CARE COORDINATION
Discharge planning    Patient chart reviewed. Appreciate prior CM assessment. Patient accepted by [facility] on discharge. Patient lives with daughter and  She is requesting walker, BSC and shower chair. Will need to discuss with attending on rounds. Therapy is recommending SNF and assessment notes that daughter would prefer home over snf ( this was before therapy notes)     Patient admitted with cirrhosis with component of alcoholic hepatitis . GI is following. Patient had flexiflo placed but was pulled out per patient last night. RN notes IR to place new one. VICK in epic. Will need plan for nutrition. . possible LTAC vs snf vs home care. IM 5/24  Plan:         1. Alcoholic liver failure  2. Elevated LFTs  3. Supra therapeutic INR   4. Hyponatremia   5. Metabolic acidosis   - MELD score of 34  - poor prognosis   - GI consulted   - continue pentoxifylline   - IR consulted for NJ tube placement for tube feedings   - paracentesis ordered for today, will need reversal of INR prior to paracentesis.  FFP ordered   - INR trending up at 4.4  - WA protocol   - continue lasix/aldactone/lactulose  - continue banana bag  - underwent U/S guided paracentesis on 5/19, 4250 ml removed

## 2021-05-25 NOTE — PROGRESS NOTES
Per Dr. Coy Christie, since patient once again did not urinate and PVR was greater than 400, ok for placement of valentine catheter and urinalysis with reflux to culture.

## 2021-05-25 NOTE — PROGRESS NOTES
Risk  Required Braces or Orthoses?: No  Position Activity Restriction  Other position/activity restrictions: Up w/assist, Lt UE IV  Subjective   General  Chart Reviewed: Yes  Patient assessed for rehabilitation services?: Yes  Response to previous treatment: Patient with no complaints from previous session  Family / Caregiver Present: Yes (daughters)      Orientation  Orientation  Overall Orientation Status: Within Functional Limits  Objective    ADL  Additional Comments: encouraged patient to eat lunch '        Balance  Sitting Balance: Stand by assistance  Standing Balance: Minimal assistance    Functional Mobility  Functional - Mobility Device: Rolling Walker  Activity: To/from bathroom  Assist Level: Minimal assistance  Functional Mobility Comments: Patient completed functional mobility around room with RW and verbal cues for safety and pacing, patient relies heavily on RW and fatigues easily    Bed mobility  Supine to Sit: Minimal assistance  Sit to Supine: Contact guard assistance;Minimal assistance  Scooting: Stand by assistance  Comment: Increased time and effort to complete    Transfers  Sit to stand: Contact guard assistance;Minimal assistance  Stand to sit: Contact guard assistance;Minimal assistance  Transfer Comments: inc. time to complete. Pt fatigues very easily. forward flexed posture, cues to achieve fully upright posture. Patient is impulsive      Cognition  Overall Cognitive Status: Exceptions  Arousal/Alertness: Appropriate responses to stimuli;Delayed responses to stimuli  Following Commands: Follows one step commands with increased time; Follows one step commands with repetition  Attention Span: Attends with cues to redirect  Safety Judgement: Decreased awareness of need for safety;Decreased awareness of need for assistance  Problem Solving: Assistance required to generate solutions;Assistance required to identify errors made;Assistance required to implement solutions;Assistance required to correct errors made;Decreased awareness of errors  Insights: Decreased awareness of deficits  Initiation: Requires cues for all  Sequencing: Requires cues for all      Plan   Plan  Times per week: 4-5x/week, 1-2x/day  Times per day: Daily  Current Treatment Recommendations: Strengthening, Balance Training, Functional Mobility Training, Endurance Training, Safety Education & Training, Self-Care / ADL, Patient/Caregiver Education & Training, Equipment Evaluation, Education, & procurement, Positioning    AM-PAC Score        AM-PAC Inpatient Daily Activity Raw Score: 16 (05/25/21 1442)  AM-PAC Inpatient ADL T-Scale Score : 35.96 (05/25/21 1442)  ADL Inpatient CMS 0-100% Score: 53.32 (05/25/21 1442)  ADL Inpatient CMS G-Code Modifier : CK (05/25/21 1442)    Goals  Short term goals  Time Frame for Short term goals: by discharge, pt will  Short term goal 1: demo S/SBA with ADL transfers with good safety/pacing and AD/DME as needed  Short term goal 2: demo S/SBA with functional mob in room with good safety/pacing and DME/AD as needed  Short term goal 3: demo S/SBA with toileting routine with good safety/pacing  Short term goal 4: demo S/SBA with UB/LB ADLs with good safety/pacing, DME as needed  Short term goal 5: demo and verb good understanding of fall prevention techs, EC/WS techs, equip needs, d/c recommendations, and B UE HEP  Patient Goals   Patient goals : to feel better, have pain subside       Therapy Time   Individual Concurrent Group Co-treatment   Time In 1359         Time Out 1418         Minutes 19           Upon writer exit, call light within reach, pt retired to bed. All lines intact and patient positioned comfortably. All patient needs addressed prior to ending therapy session. Chart reviewed prior to treatment and patient is agreeable for therapy. RN reports patient is medically stable for therapy treatment this date.       MERE Catalan/TARAS

## 2021-05-25 NOTE — PROGRESS NOTES
[facility]  Office: [address], DO, Candace Gaspar, DO, Irina Culp, DO, Nate Polanco Blood, DO, Angela Le MD, Marilyn Potter MD, Fernanda Hebert MD, Hank Wadsworth MD, Danelle Wild MD, Preethi Stark MD, Blake Jimenez MD, Natasha Villegas MD, Aaron Noriega DO, Joesph Larry MD, Zelalem Gutierrez DO, Jose Juan Mercado MD,  Garfield Stevenson DO, Isma Winn MD, uRma Bal MD, John Villatoro MD, Rosibel Agee MD, Lizz Garay, Alvarez Velasco, CNP, Douglas Woody, CNP, Dmitry Goncalves, [facility], Ben Mederos, CNP, Vazquez Stern, CNP, Rubi June, CNP, Catherine Cisneros, CNP, Chaparrita Gongora, CNP, Tara Smart PA-C, Mary Mack, [place], Monica Coffey, CNP, Pam Diaz, CNP, Linus Black, CNP, Marie Simon, CNP, Rebecca Givens, [facility], Arcenio Kidd, [facility]    Progress Note    Name:   Melissa Juares  MRN:     [id number]     Acct:      [de-identified]   Room:   [facility]   Day:  4  Admit Date:  5/21/2021 12:50 PM    PCP:   PEEWEE Murray CNP  Code Status:  Full Code    Subjective:     C/C:   Chief Complaint   Patient presents with    Abdominal Pain    Other     jaundice     Interval History Status: not changed. Patient complaining of nausea, uncontrolled abdominal pain this morning, appears very uncomfortable. Per RN, patient pulled out his feeding tube overnight that was placed by IR last evening. He also had 2 small bowel movements last evening. He drank all of his Ensure this morning without issues. Is afebrile, tachycardia heart rate in 100, blood pressure 115/70, maintaining O2 sats on room air. CBC BMP reviewed. Sodium improved to 133, hemoglobin 13.3 WBC 9.3. Bilirubin 19.20  Brief History:   64-year-old with history of alcoholic liver disease presented to ED on 5/21 with abdominal pain and worsening jaundice.   Per EMR he was drinking alcohol until recent admission at [facility]. Jazmine from 5/16-5/20 with ascites secondary to liver failure, rectal bleeding. He underwent EGD and colonoscopy which showed multiple duodenal versus moderate amount of portal hypertensive gastropathy, grade 2 internal hemorrhoids. Had paracentesis done on 5/19/2021 when 4250ml was removed. -GI was consulted. INR, bilirubin remain elevated. Review of Systems:   Unreliable as  patient appears to be in distress secondary to abdominal pain and very focused on that. Constitutional:  negative for chills, fevers, sweats  Respiratory:  negative for cough, dyspnea on exertion, shortness of breath, wheezing  Cardiovascular:  negative for chest pain, chest pressure/discomfort, lower extremity edema, palpitations  Gastrointestinal:  +abdominal pain,  nausea, vomiting  Neurological:  negative for dizziness, headache  Medications: Allergies:     Allergies   Allergen Reactions    Pcn [Penicillins] Swelling    Vicodin [Hydrocodone-Acetaminophen] Swelling    Motrin [Ibuprofen] Swelling       Current Meds:   Scheduled Meds:    sodium bicarbonate  650 mg Oral 4x Daily    thiamine and folic acid IVPB   Intravenous Daily    sodium chloride flush  5-40 mL Intravenous 2 times per day    pentoxifylline  400 mg Oral TID WC    [Held by provider] folic acid  1 mg Oral Daily    furosemide  20 mg Oral Daily    lactulose  20 g Oral TID    budesonide-formoterol  2 puff Inhalation BID    multivitamin  1 tablet Oral Daily    nicotine  1 patch Transdermal Q24H    pantoprazole  40 mg Oral QAM AC    spironolactone  25 mg Oral Daily    [Held by provider] vitamin B-1  100 mg Oral Daily     Continuous Infusions:    sodium chloride      sodium chloride      sodium chloride       PRN Meds: bisacodyl, sodium chloride, sodium chloride flush, sodium chloride, LORazepam **OR** LORazepam **OR** LORazepam **OR** LORazepam **OR** LORazepam **OR** LORazepam **OR** LORazepam **OR** LORazepam, oxyCODONE, sodium chloride, potassium chloride,

## 2021-05-25 NOTE — PROGRESS NOTES
[facility] PALLIATIVE CARE NURSING ASSESSMENT    Patient: Ebony Schaffer  Room: [facility]/1011-    Reason For Consult   Goals of care evaluation  Distress management  Guidance and support  Facilitate communications  Assistance in coordinating care    Code Status: Full Code      Impression: Ebony Schaffer is a 77y.o. year old male  has a past medical history of Anxiety, Asthma, Blind left eye, Cervical spine fracture ([facility] COPD (chronic obstructive pulmonary disease) ([facility] Depression, H/O: [facility] Hep C w/o coma, chronic ([facility] Hepatitis C, History of suicidal tendencies, Hx: UTI (urinary tract infection), and Schizophrenia, schizo-affective ([facility] .  Currently hospitalized for the management of alcoholic liver failure. The Palliative Care Team is following to assist with goals of care/support. Vital Signs  Blood pressure 108/65, pulse 105, temperature 97.6 °F (36.4 °C), temperature source Oral, resp. rate 18, height 6' 1\" (1.854 m), weight 180 lb (81.6 kg), SpO2 95 %. Patient Active Problem List   Diagnosis    COPD (chronic obstructive pulmonary disease) ([facility]    Chronic hepatitis C without hepatic coma (HCC)    Marijuana abuse    S/P cervical spinal fusion    Alcoholic liver failure (HCC)    Rectal bleed    Thrombocytopenia (HCC)    Abdominal ascites    Pedal edema    Lactic acidosis    Alcoholic cirrhosis of liver with ascites (HCC)    Jaundice    Abnormal INR    Hyponatremia    Hyperbilirubinemia    Acute liver failure without hepatic coma       Palliative Interaction: Update rec'd from the bedside RN. She states GI will be rounding to see patient and discuss possible gtube placement as patient has pulled his flexiflow out that was placed by IR. IR will not place another flexiflow. Nurse states patient has been intermittently confused at times. I entered the room and patient was resting in bed, rubbing his abdomen. There is no family present.  I introduced myself to patient and explained my palliative role. I assessed patient's orientation and he is oriented x3. He can tell me why he is here. Pt states he is having uncontrolled pain. His abdomen is round and distended. He had a paracentesis done yesterday and 2600ml was obtained. He states he felt somewhat better after that but is now is feeling bad again. GOALS OF CARE: I talked with patient about his wishes. I asked him if he wished to continue seeking aggressive medical treatment for his condition. He states, \"Yes, I want everything done for me\". We also discussed his wishes regarding CPR, he again, states \"I want you to try everything\". I went over this in more detail and pt wishes to remain a full code at this time. Pt lives with his daughter Libia Kwong who is a HHA. Possibly to return to daughter's home under her care. Will wait for GI to round to see what options they offer and will come back tomorrow to discuss with patient and family. Pt's daughter Libia Kwong is designated Daniel Mathew and a copy is on patient's paper chart. Will have NP see tomorrow after GI sees to discuss with patient and his family. Goals/Plan of care  Education/support to patient  Discharge planning/helping to coordinate care  Communications with primary service  Providing support for coping/adaptation/distress of patient  Discussing meaning/purpose   Continue with current plan of care  Clarification of medical condition to patient and family  Code status clarified: Full Code  Validating patient/family distress  Continued communication updates  Had discussion with patient (no family present). Pt wishes to continue aggressive medical care. He wishes to remain a full code. Emotional support offered. Will wait for GI to round and come back tomorrow to discuss further with patient and his family.       Palliative Care Coordinator  Carmen Escobar RN, [facility]UM[facility] Office: [address] Office: [phone]    For Symptom Management

## 2021-05-25 NOTE — ACP (ADVANCE CARE PLANNING)
..Advance Care Planning     Advance Care Planning Activator (Inpatient)  Conversation Note      Date of ACP Conversation: 5/25/2021     Conversation Conducted with: Patient with Decision Making Capacity    ACP Activator: Parvin Cleary RN      Current Designated Health Care Decision Maker: Elijah  [phone]      Care Preferences    Ventilation: \"If you were in your present state of health and suddenly became very ill and were unable to breathe on your own, what would your preference be about the use of a ventilator (breathing machine) if it were available to you? \"      Would the patient desire the use of ventilator (breathing machine)?: yes    \"If your health worsens and it becomes clear that your chance of recovery is unlikely, what would your preference be about the use of a ventilator (breathing machine) if it were available to you? \"     Would the patient desire the use of ventilator (breathing machine)?: Yes      Resuscitation  \"CPR works best to restart the heart when there is a sudden event, like a heart attack, in someone who is otherwise healthy. Unfortunately, CPR does not typically restart the heart for people who have serious health conditions or who are very sick. \"    \"In the event your heart stopped as a result of an underlying serious health condition, would you want attempts to be made to restart your heart (answer \"yes\" for attempt to resuscitate) or would you prefer a natural death (answer \"no\" for do not attempt to resuscitate)? \" yes       [] Yes   [] No   Educated Patient / Vickii Putt regarding differences between Advance Directives and portable DNR orders.     Length of ACP Conversation in minutes:      Conversation Outcomes:  [x] ACP discussion completed  [] Existing advance directive reviewed with patient; no changes to patient's previously recorded wishes  [] New Advance Directive completed  [] Portable Do Not Rescitate prepared for Provider review and signature  [] POLST/POST/MOLST/MOST prepared for Provider review and signature      Follow-up plan:    [x] Schedule follow-up conversation to continue planning  [] Referred individual to Provider for additional questions/concerns   [] Advised patient/agent/surrogate to review completed ACP document and update if needed with changes in condition, patient preferences or care setting    [] This note routed to one or more involved healthcare providers

## 2021-05-25 NOTE — CARE COORDINATION
Discharge planning    Discussed during quality flow rounds with RN and attending. Will have palliative care come to see patient. Family declined having flexiflo replaced. Will await palliative care consult and possible snf vs home with [facility] ( they have accepted.)     IF home with home care requesting DME: walker, BSC and shower chair.

## 2021-05-25 NOTE — PROGRESS NOTES
Physical Therapy  Facility/Department: [facility] PROGRESSIVE CARE  Daily Treatment Note  NAME: Vin Dalton  : 1955  MRN: [id number]    Date of Service: 2021    Discharge Recommendations:  Patient would benefit from continued therapy after discharge     Pt currently functioning below baseline. Would suggest additional therapy at time of discharge to maximize long term outcomes and prevent re-admission. Please refer to AM-PAC score for current level of function. Assessment   Body structures, Functions, Activity limitations: Decreased strength;Decreased posture  Assessment: Patient continues to demo poor tolerance of activity due to pain, overall decreased strength and endurance. Recommending continued skilled PT serivces. Prognosis: Good  PT Education: Transfer Training;Gait Training;PT Role  REQUIRES PT FOLLOW UP: Yes  Activity Tolerance  Activity Tolerance: Patient limited by pain; Patient limited by fatigue     Patient Diagnosis(es): The encounter diagnosis was Acute liver failure without hepatic coma. has a past medical history of Anxiety, Asthma, Blind left eye, Cervical spine fracture ([address] COPD (chronic obstructive pulmonary disease) ([address] Depression, H/O: substance abuse ([address] Hep C w/o coma, chronic ([address] Hepatitis C, History of suicidal tendencies, Hx: UTI (urinary tract infection), and Schizophrenia, schizo-affective ([address] has a past surgical history that includes Umbilical hernia repair; Cataract removal with implant; Vein Surgery; hernia repair; Eye surgery (Left, 13); vitrectomy (Left, 2013); eye surgery (Left, 13); Upper gastrointestinal endoscopy (N/A, 2021); and Colonoscopy (N/A, 2021).     Restrictions  Restrictions/Precautions  Restrictions/Precautions: General Precautions, Fall Risk  Required Braces or Orthoses?: No  Position Activity Restriction  Other position/activity restrictions: Up w/assist, Lt UE IV  Subjective   General  Chart Reviewed: Yes  Response To Previous Treatment: Patient with no complaints from previous session. Family / Caregiver Present: Yes  Subjective  Subjective: Patient is agreeable for PT treatment. General Comment  Comments: RN, report patient was medically stable for PT treatment. Patient recently given anti-nausea meds. Orientation  Orientation  Overall Orientation Status: Within Normal Limits  Cognition   Cognition  Overall Cognitive Status: Exceptions  Arousal/Alertness: Appropriate responses to stimuli;Delayed responses to stimuli  Following Commands: Follows one step commands with increased time; Follows one step commands with repetition  Attention Span: Attends with cues to redirect  Safety Judgement: Decreased awareness of need for safety;Decreased awareness of need for assistance  Problem Solving: Assistance required to generate solutions;Assistance required to identify errors made;Assistance required to implement solutions;Assistance required to correct errors made;Decreased awareness of errors  Insights: Decreased awareness of deficits  Initiation: Requires cues for all  Sequencing: Requires cues for all  Objective   Bed mobility  Rolling to Right: Contact guard assistance  Supine to Sit: Stand by assistance  Sit to Supine: Contact guard assistance;Minimal assistance  Scooting: Stand by assistance  Comment: Increased time and effort to complete.   Transfers  Sit to Stand: Contact guard assistance  Stand to sit: Contact guard assistance  Bed to Chair: Contact guard assistance  Stand Pivot Transfers: Contact guard assistance  Lateral Transfers: Contact guard assistance  Comment: w/ RW and VCs for increased safety  Ambulation  Ambulation?: Yes  More Ambulation?: No  Ambulation 1  Surface: level tile  Device: Rolling Walker  Assistance: Contact guard assistance;Minimal assistance  Quality of Gait: Significant flexed posture during gait, unable to  Gait Deviations: Slow Mayela;Decreased step length  Distance: 30' x 1  Stairs/Curb  Stairs?: No  Neuromuscular Education  NDT Treatment: Gait ;Sitting;Standing  Neuromuscular Comments: VCs req for proper breathing wagner (pursed lip breathing) during functional mobility. Tactile and VCs req for postural control during sit<>stands & amb to promote abdominal and erector spinae mm facilitation for increased stability and balance, decreasing kyphosis of the spine. Pt req VCs to correct for anterior translation of femur with squatting in addition to pressing firmly into ground with feet, to promote the appropriate body mechanics for sit<>stand transfers. Balance  Sitting - Static: Fair  Sitting - Dynamic: Fair  Standing - Static: Fair;-  Standing - Dynamic: Fair;-  Comments: w/ RW for standing balance  Exercises  Comments: Patient issued seated and supine HEP with verbal review. Patient able to perfrom seated gaurav LE AROM well. Access Code: [email or web address]: [employer]/Prepared by: Manolo Saliva  Exercises    Seated Scapular Retraction - 1 x daily - 7 x weekly - 10 reps - 3 sets    Seated Ankle Pumps - 1 x daily - 7 x weekly - 10 reps - 3 sets    Seated Gluteal Sets - 1 x daily - 7 x weekly - 10 reps - 3 sets    Seated Long Arc Quad - 1 x daily - 7 x weekly - 10 reps - 3 sets    Seated March - 1 x daily - 7 x weekly - 10 reps - 3 sets    Seated Hip Abduction - 1 x daily - 7 x weekly - 10 reps - 3 sets    Seated Pursed Lip Breathing - 1 x daily - 7 x weekly - 10 reps - 3 sets      AM-PAC Score  AM-PAC Inpatient Mobility Raw Score : 18 (05/25/21 1210)  AM-PAC Inpatient T-Scale Score : 43.63 (05/25/21 1210)  Mobility Inpatient CMS 0-100% Score: 46.58 (05/25/21 1210)  Mobility Inpatient CMS G-Code Modifier : CK (05/25/21 1210)          Goals  Short term goals  Time Frame for Short term goals: 10 days  Short term goal 1: I bed mobility  Short term goal 2: I trasnfers  Short term goal 3: I gait with appropriate devices 150 feet  Short term goal 4: 17 steps using rail for support  Patient Goals   Patient goals : to feel better    Plan    Plan  Times per week: 1-2x/day, 5-6 days/week  Current Treatment Recommendations: Transfer Training, Strengthening  Safety Devices  Type of devices: Gait belt, Left in bed, All fall risk precautions in place  Restraints  Initially in place: No     Therapy Time   Individual Concurrent Group Co-treatment   Time In 1059         Time Out 1141         Minutes [address]

## 2021-05-25 NOTE — PROGRESS NOTES
During rounding, RN found patient laying with his head at the foot of the bed and flexiflo tube for tube feeding pulled out and laying on the bed. Patient assisted with getting head to head of the bed.

## 2021-05-25 NOTE — CARE COORDINATION
Social Work-Contacted patient's daughter to discuss dc options. Discussed therapy recommendation of SNF. She states that she would like patient to return home and would quit her job, if necessary. Discussed that he may need tube feeding and asked if she would be comfortable managing it at home. Will contact daughter and meet with patient to finalize dc plans.  Margie Vargas

## 2021-05-26 VITALS
SYSTOLIC BLOOD PRESSURE: 111 MMHG | BODY MASS INDEX: 23.86 KG/M2 | HEART RATE: 101 BPM | OXYGEN SATURATION: 94 % | DIASTOLIC BLOOD PRESSURE: 91 MMHG | TEMPERATURE: 95.6 F | WEIGHT: 180 LBS | HEIGHT: 73 IN | RESPIRATION RATE: 25 BRPM

## 2021-05-26 LAB
-: NORMAL
ALBUMIN FLUID: <0.2 G/DL
ALBUMIN SERPL-MCNC: 3 G/DL (ref 3.5–5.2)
ALBUMIN/GLOBULIN RATIO: ABNORMAL (ref 1–2.5)
ALP BLD-CCNC: 211 U/L (ref 40–129)
ALT SERPL-CCNC: 235 U/L (ref 5–41)
AMMONIA: 49 UMOL/L (ref 16–60)
ANION GAP SERPL CALCULATED.3IONS-SCNC: 19 MMOL/L (ref 9–17)
ANION GAP SERPL CALCULATED.3IONS-SCNC: 22 MMOL/L (ref 9–17)
ANION GAP SERPL CALCULATED.3IONS-SCNC: 23 MMOL/L (ref 9–17)
AST SERPL-CCNC: 469 U/L
BILIRUB SERPL-MCNC: 19.32 MG/DL (ref 0.3–1.2)
BILIRUBIN DIRECT: 13.98 MG/DL
BILIRUBIN, INDIRECT: 5.34 MG/DL (ref 0–1)
CASE NUMBER:: NORMAL
CHLORIDE BLD-SCNC: 96 MMOL/L (ref 98–107)
CHLORIDE BLD-SCNC: 96 MMOL/L (ref 98–107)
CHLORIDE BLD-SCNC: 97 MMOL/L (ref 98–107)
CO2: 17 MMOL/L (ref 20–31)
CO2: 18 MMOL/L (ref 20–31)
CO2: 19 MMOL/L (ref 20–31)
DIRECT EXAM: ABNORMAL
GLOBULIN: ABNORMAL G/DL (ref 1.5–3.8)
HCT VFR BLD CALC: 39.5 % (ref 40.7–50.3)
HCT VFR BLD CALC: 40.5 % (ref 40.7–50.3)
HCT VFR BLD CALC: 41.7 % (ref 40.7–50.3)
HEMOGLOBIN: 13 G/DL (ref 13–17)
HEMOGLOBIN: 14.2 G/DL (ref 13–17)
HEMOGLOBIN: 14.2 G/DL (ref 13–17)
INR BLD: 5.2
Lab: ABNORMAL
POTASSIUM SERPL-SCNC: 4 MMOL/L (ref 3.7–5.3)
POTASSIUM SERPL-SCNC: 4 MMOL/L (ref 3.7–5.3)
POTASSIUM SERPL-SCNC: 4.2 MMOL/L (ref 3.7–5.3)
PROTHROMBIN TIME: 45.8 SEC (ref 11.5–14.2)
REASON FOR REJECTION: NORMAL
SODIUM BLD-SCNC: 132 MMOL/L (ref 135–144)
SODIUM BLD-SCNC: 137 MMOL/L (ref 135–144)
SODIUM BLD-SCNC: 138 MMOL/L (ref 135–144)
SPECIMEN DESCRIPTION: ABNORMAL
SPECIMEN DESCRIPTION: NORMAL
SPECIMEN TYPE: NORMAL
SPECIMEN TYPE: NORMAL
TOTAL PROTEIN, BODY FLUID: <1 G/DL
TOTAL PROTEIN: 6.2 G/DL (ref 6.4–8.3)
ZZ NTE CLEAN UP: ORDERED TEST: NORMAL
ZZ NTE WITH NAME CLEAN UP: SPECIMEN SOURCE: NORMAL

## 2021-05-26 PROCEDURE — 94761 N-INVAS EAR/PLS OXIMETRY MLT: CPT

## 2021-05-26 PROCEDURE — 99222 1ST HOSP IP/OBS MODERATE 55: CPT | Performed by: NURSE PRACTITIONER

## 2021-05-26 PROCEDURE — 80076 HEPATIC FUNCTION PANEL: CPT

## 2021-05-26 PROCEDURE — 6370000000 HC RX 637 (ALT 250 FOR IP): Performed by: NURSE PRACTITIONER

## 2021-05-26 PROCEDURE — 85014 HEMATOCRIT: CPT

## 2021-05-26 PROCEDURE — 6370000000 HC RX 637 (ALT 250 FOR IP): Performed by: FAMILY MEDICINE

## 2021-05-26 PROCEDURE — 82140 ASSAY OF AMMONIA: CPT

## 2021-05-26 PROCEDURE — 51702 INSERT TEMP BLADDER CATH: CPT

## 2021-05-26 PROCEDURE — 99233 SBSQ HOSP IP/OBS HIGH 50: CPT | Performed by: INTERNAL MEDICINE

## 2021-05-26 PROCEDURE — 6360000002 HC RX W HCPCS: Performed by: NURSE PRACTITIONER

## 2021-05-26 PROCEDURE — 2580000003 HC RX 258: Performed by: NURSE PRACTITIONER

## 2021-05-26 PROCEDURE — APPSS45 APP SPLIT SHARED TIME 31-45 MINUTES: Performed by: NURSE PRACTITIONER

## 2021-05-26 PROCEDURE — 80051 ELECTROLYTE PANEL: CPT

## 2021-05-26 PROCEDURE — 85610 PROTHROMBIN TIME: CPT

## 2021-05-26 PROCEDURE — 97530 THERAPEUTIC ACTIVITIES: CPT

## 2021-05-26 PROCEDURE — 85018 HEMOGLOBIN: CPT

## 2021-05-26 PROCEDURE — 94640 AIRWAY INHALATION TREATMENT: CPT

## 2021-05-26 PROCEDURE — 97112 NEUROMUSCULAR REEDUCATION: CPT

## 2021-05-26 PROCEDURE — 36415 COLL VENOUS BLD VENIPUNCTURE: CPT

## 2021-05-26 PROCEDURE — 2500000003 HC RX 250 WO HCPCS: Performed by: NURSE PRACTITIONER

## 2021-05-26 PROCEDURE — 97116 GAIT TRAINING THERAPY: CPT

## 2021-05-26 RX ORDER — LORAZEPAM 2 MG/ML
0.5 INJECTION INTRAMUSCULAR EVERY 6 HOURS PRN
Status: DISCONTINUED | OUTPATIENT
Start: 2021-05-26 | End: 2021-05-26 | Stop reason: HOSPADM

## 2021-05-26 RX ORDER — FENTANYL CITRATE 50 UG/ML
25 INJECTION, SOLUTION INTRAMUSCULAR; INTRAVENOUS
Status: DISCONTINUED | OUTPATIENT
Start: 2021-05-26 | End: 2021-05-26 | Stop reason: HOSPADM

## 2021-05-26 RX ORDER — FENTANYL CITRATE 50 UG/ML
50 INJECTION, SOLUTION INTRAMUSCULAR; INTRAVENOUS ONCE
Status: COMPLETED | OUTPATIENT
Start: 2021-05-26 | End: 2021-05-26

## 2021-05-26 RX ADMIN — LORAZEPAM 0.5 MG: 2 INJECTION, SOLUTION INTRAMUSCULAR; INTRAVENOUS at 14:36

## 2021-05-26 RX ADMIN — LACTULOSE 20 G: 20 SOLUTION ORAL at 08:48

## 2021-05-26 RX ADMIN — SODIUM BICARBONATE 650 MG: 650 TABLET ORAL at 08:48

## 2021-05-26 RX ADMIN — FOLIC ACID: 5 INJECTION, SOLUTION INTRAMUSCULAR; INTRAVENOUS; SUBCUTANEOUS at 08:48

## 2021-05-26 RX ADMIN — RIFAXIMIN 550 MG: 550 TABLET ORAL at 08:48

## 2021-05-26 RX ADMIN — PANTOPRAZOLE SODIUM 40 MG: 40 TABLET, DELAYED RELEASE ORAL at 06:11

## 2021-05-26 RX ADMIN — BUDESONIDE AND FORMOTEROL FUMARATE DIHYDRATE 2 PUFF: 160; 4.5 AEROSOL RESPIRATORY (INHALATION) at 07:35

## 2021-05-26 RX ADMIN — FENTANYL CITRATE 25 MCG: 50 INJECTION, SOLUTION INTRAMUSCULAR; INTRAVENOUS at 14:03

## 2021-05-26 RX ADMIN — PENTOXIFYLLINE 400 MG: 400 TABLET, EXTENDED RELEASE ORAL at 08:48

## 2021-05-26 RX ADMIN — SPIRONOLACTONE 25 MG: 25 TABLET ORAL at 08:48

## 2021-05-26 RX ADMIN — MULTIVITAMIN TABLET 1 TABLET: TABLET at 08:48

## 2021-05-26 RX ADMIN — FUROSEMIDE 20 MG: 20 TABLET ORAL at 08:48

## 2021-05-26 RX ADMIN — FENTANYL CITRATE 50 MCG: 50 INJECTION, SOLUTION INTRAMUSCULAR; INTRAVENOUS at 02:41

## 2021-05-26 ASSESSMENT — PAIN DESCRIPTION - DESCRIPTORS: DESCRIPTORS: ACHING

## 2021-05-26 ASSESSMENT — PAIN SCALES - GENERAL
PAINLEVEL_OUTOF10: 0
PAINLEVEL_OUTOF10: 10

## 2021-05-26 ASSESSMENT — PAIN DESCRIPTION - FREQUENCY: FREQUENCY: CONTINUOUS

## 2021-05-26 ASSESSMENT — PAIN DESCRIPTION - LOCATION: LOCATION: GENERALIZED

## 2021-05-26 NOTE — PROGRESS NOTES
Primary team came down to see patient. Discussed code status with RN, SON Robles as well as SON Monreal from palliative about what the patient wants as far as care. Pt did not want compressions done or a tube down his throat if he stopped breathing. He stated, \"I want to be comfortable\". Code status then changed by [employer], CNP.

## 2021-05-26 NOTE — PROGRESS NOTES
[facility] consult started. [phone]. [place] needs paperwork faxed to [phone]. To do so shortly. Pastoral care coming down to see patient shortly.

## 2021-05-26 NOTE — CONSULTS
Palliative Care Inpatient Consult    NAME:  [address] RECORD NUMBER:  [id number]  AGE: 77 y.o. GENDER: male  : 1955  TODAY'S DATE:  2021    Reasons for Consultation:    Symptom and/or pain management  Provision of information regarding PC and/or hospice philosophies  Complex, time-intensive communication and interdisciplinary psychosocial support  Clarification of goals of care and/or assistance with difficult decision-making  Guidance in regards to resources and transition(s)    Members of PC team contributing to this consultation are :  Mackenzie Hyde, Palliative Care CNP  History of Present Illness     The patient is a 77 y.o. Non-/non  male who presents with Abdominal Pain and Other (jaundice)      Referred to Palliative Care by   [x] Physician   [] Nursing  [] Family Request   [] Other:       He was admitted to the primary service for Abdominal pain [R10.9]. His hospital course has been associated with Alcoholic liver failure ([facility] [address] with ascites, Acute urine retention, malnutrition, thrombocytopenia, abnormal INR, Elevated LFT's, GI bleed, Hyperammoniemia, and hyponatremia. The patient has a complicated medical history and has been hospitalized since 2021 12:50 PM. I reviewed patients EMR, spoke to RN, and patient/POA Nadege to collect history. Patient has history of COPD, Anxiety/depression, Schizophrenia, Asthma, Hep C, ETOH abuse, Marijuana use, and blind in Left eye. Patient was recently discharged on  from [facility] where he was treated for Alcoholic liver failure, and rectal bleed. Patient has EGD/Colonoscopy on  that revealed 5mm sessile polyp in the sigmoid colon resected with cold snare. Clipped with 2 metal resolution clips. Small rectal varices. Grade 2 internal hemorrhoids. Small esophageal varices. Moderate amount of portal hypertensive gastropathy of the stomach. Multiple duodenal varices.  Patient also had paracentesis on  with 4,250 ml drained. Patient came back to the ER within 24 hours with continued complaints of abdominal pain/distention, jaundice, and BLE edema. He reported abdominal pain has been ongoing for several weeks, and he also reported bright red blood after BM on toilet paper. He also reports having darker urine. Patients admitting pertinent labs included; Na 127, Hgb 12.9, INR 3.3, Albumin 2.9, Alk Phos 181, , , Total Bilirubin 19.20, Ammonia 73, and platelets 61. Patient was admitted, and GI consulted. Patient had feeding tube placed by IR. He also underwent IR guided paracentesis with 2.6L drained. Patient had pulled out feeding tube since, and GI reports to encourage ensure/diet and to not replace NG tube. RN reports decline this AM with patient. Patient tachycardic, having a difficulty time breathing with respirations in high 30's, and requiring oxygen supplementation. I seen patient, and he is semi-oriented at this time. He reports name, and place but is unable to report time. He is able to talk about condition, and answer questions. IM NP at bedside discussing code status with patient, so writer joined. Patient reported that he would not want CPR, or intubation. He reported, \"I want to be comfortable. \" I informed IM NP that I would reach out to POA to further discuss. Patients DNR form filled out per IM NP. Palliative care was consulted for goals of care, and support. 5/26 pertinent labs include; Na 132, INR 5.2, Albumin 3, Alk Phos 211, , , and Total Bilirubin 19.32.      Active Hospital Problems    Diagnosis Date Noted    Hyponatremia [E87.1]     Thrombocytopenia (HCC) [U56.4]     Alcoholic liver failure (HCC) [K70.40]     Abnormal INR [R79.1]     COPD (chronic obstructive pulmonary disease) (HCC) [J44.9]     Chronic hepatitis C without hepatic coma (HCC) [B18.2]        PAST MEDICAL HISTORY      Diagnosis Date    Anxiety     Asthma     Blind left eye     glass eye after trauma to eye  Cervical spine fracture (HCC)     C7, after fall, no surgery    COPD (chronic obstructive pulmonary disease) (HCC)     Depression     H/O: substance abuse ([facility] [address]     etoh,marijuana    Hep C w/o coma, chronic ([facility] [address] 10/15/2014    Hepatitis C     History of suicidal tendencies     Hx: UTI (urinary tract infection)     Schizophrenia, schizo-affective ([facility] [address]        PAST SURGICAL HISTORY  Past Surgical History:   Procedure Laterality Date    CATARACT REMOVAL WITH IMPLANT      Left eye    COLONOSCOPY N/A 5/19/2021    COLONOSCOPY POLYPECTOMY SNARE/COLD BIOPSY performed by Tawana Meredith MD at [address] Left 8/27/13    repair of ruptured globe; vitrectomy    EYE SURGERY Left 9/24/13    enuculation    HERNIA REPAIR      inguinal    UMBILICAL HERNIA REPAIR      UPPER GASTROINTESTINAL ENDOSCOPY N/A 5/19/2021    EGD DIAGNOSTIC ONLY performed by Tawana Meredith MD at [address]      Leg vein    VITRECTOMY Left 08/27/2013    and globe repair       SOCIAL HISTORY  Social History     Tobacco Use    Smoking status: Current Some Day Smoker     Packs/day: 3.00     Years: 50.00     Pack years: 150.00     Types: Cigarettes    Smokeless tobacco: Never Used   Vaping Use    Vaping Use: Never used   Substance Use Topics    Alcohol use: Not Currently     Alcohol/week: 28.0 standard drinks     Types: 28 Cans of beer per week    Drug use: Yes     Types: Marijuana     Comment: occasional       ALLERGIES  Allergies   Allergen Reactions    Pcn [Penicillins] Swelling    Vicodin [Hydrocodone-Acetaminophen] Swelling    Motrin [Ibuprofen] Swelling         MEDICATIONS  Current Medications    lactulose  20 g Oral 4x Daily    rifaximin  550 mg Oral BID    sodium bicarbonate  650 mg Oral 4x Daily    thiamine and folic acid IVPB   Intravenous Daily    sodium chloride flush  5-40 mL Intravenous 2 times per day    pentoxifylline  400 mg Oral TID WC    [Held by provider] folic acid  1 mg Oral Daily    furosemide  20 mg Oral Daily    budesonide-formoterol  2 puff Inhalation BID    multivitamin  1 tablet Oral Daily    nicotine  1 patch Transdermal Q24H    pantoprazole  40 mg Oral QAM AC    spironolactone  25 mg Oral Daily    [Held by provider] vitamin B-1  100 mg Oral Daily     bisacodyl, [Held by provider] morphine, sodium chloride, sodium chloride flush, sodium chloride, [Held by provider] oxyCODONE, potassium chloride, magnesium sulfate, acetaminophen, promethazine **OR** ondansetron, albuterol  IV Drips/Infusions   sodium chloride 50 mL/hr at 05/25/21 1842    sodium chloride      sodium chloride       Home Medications  No current facility-administered medications on file prior to encounter.      Current Outpatient Medications on File Prior to Encounter   Medication Sig Dispense Refill    pantoprazole (PROTONIX) 40 MG tablet Take 1 tablet by mouth every morning (before breakfast) 30 tablet 3    vitamin B-1 (THIAMINE) 100 MG tablet Take 1 tablet by mouth daily 30 tablet 2    Multiple Vitamin (MULTIVITAMIN) TABS tablet Take 1 tablet by mouth daily 30 tablet 2    furosemide (LASIX) 20 MG tablet Take 1 tablet by mouth daily 60 tablet 3    spironolactone (ALDACTONE) 25 MG tablet Take 1 tablet by mouth daily 30 tablet 3    folic acid (FOLVITE) 1 MG tablet Take 1 tablet by mouth daily 30 tablet 3    lactulose (CHRONULAC) 10 GM/15ML solution Take 30 mLs by mouth 3 times daily 2 Bottle 2    albuterol sulfate HFA (PROVENTIL HFA) 108 (90 Base) MCG/ACT inhaler Inhale 2 puffs into the lungs every 6 hours as needed for Wheezing 1 Inhaler 3    mometasone-formoterol (DULERA) 100-5 MCG/ACT inhaler Inhale 2 puffs into the lungs 2 times daily 1 Inhaler 2    nicotine (NICODERM CQ) 21 MG/24HR Place 1 patch onto the skin every 24 hours 30 patch 1       Data         /75   Pulse 105   Temp 97.5 °F (36.4 °C) (Axillary)   Resp 19   Ht 6' 1\" (1.854 m)   Wt 180 lb (81.6 kg)   SpO2 94%   BMI patient has declined since yesterday. Patient became less communicative, tachycardic, elevated respirations, and hypoxia requiring NC. I went to see patient, and he was lying in bed awake. I introduced myself to him, and the palliative role. I reminded him that our RN stopped to see him yesterday. Patient not very communicative, so I checked orientation level. He reports name, and that he is in hospital, but reports date as 3-21. I discussed his chronic illness, and current hospitalized problems with him, and he reports being in pain. He moans at times as well. I offered him support, and informed him that I would reach out to POA/daughter. RN reports YARA NP to patients room to discuss decline, and code status. Writer went to join conversation, and Margaret NP discussed current decline, and asked patient if he wants aggressive measures. Code status was explained, and patient reported no to CPR and intubation to her. He also reported, \"just want to be comfortable. \" Margaret NP filled [facility] form out and signed/placed on chart. I informed her that I would reach out to POA/daughter to further discuss. I was called by RN that POA is present in room, and would like to meet with writer. I went to patients room, and discussed patients decline, hospitalized problems, and QOL. Daughter reports patient has drank alcohol for as long as she has known. She reports patient recently being diagnosed with alcoholic liver disease. She reports patients decline has been sudden and fast. She is tearful at this time. I explained our conversation with patient earlier, and talked with patient and her. I explained that patient has expressed comfort care, and discussed what this was. I also explained the option to add hospice care for extra support, and symptom management. I explained all levels of hospice care. Daughter Nadege/POA reports agreeing with comfort care, and would like meeting with Eugenia d/t it being closer to her home.  She reports being torn with home hospice, and inpatient but will discuss services and options during hospice meeting. I placed [facility] order, and updated RN that family wants Valerie Otero meeting ASAP. She will arrange. Cristopher Bustos has asked her sister to also come to the hospital at this time. She came to sanz and was tearful, tissues and chair provided.  visited, and we took daughter to bedside, and placed prayer blanket on patient and Ronda Herbert provided prayer with us present. I updated Margaret NP, and RN. RN was informed that once other family arrives, that Piedad Duong will be available for support this afternoon. I discussed comfort care orders with RN as well. Education/support to staff  Education/support to family  Education/support to patient  Discharge planning/helping to coordinate care  Communications with primary service  Providing support for coping/adaptation/distress of family  Providing support for coping/adaptation/distress of patient  Substance abuse issues  Discussing meaning/purpose   Decision making regarding life prolonging treatment  Decisional capacity assessed  Continue with current plan of care  Clarification of medical condition to patient and family  Code status clarified: Full Code  Code status clarified: [facility]  Code status clarified: [facility]  Palliative care orders introduced  Provided information about hospice  Validating patient/family distress  Continued communication updates  Patient reports wanting to be comfort care, and discussed with POA who was at bedside. We discussed Chronic disease process, and QOL. Daughter is agreeable to comfrot care, and patient is requesting comfort meds so these were reviewed with her as well. She is agreeable to this. We discussed the option for hospice care, and is agreeable to meeting with Eugenia guerra ASA. She is calling other children to update.    Principle Problem/Diagnosis:  Alcoholic liver failure ([facility] [address]    Additional Assessments:   Principal Problem:    Alcoholic liver failure (HCC)  Active Problems:    COPD (chronic obstructive pulmonary disease) (HCC)    Chronic hepatitis C without hepatic coma (HCC)    Thrombocytopenia (HCC)    Abnormal INR    Hyponatremia  Resolved Problems:    Abdominal pain    1- Symptom management/ pain control     Pain Assessment:  Pain is not well controlled. Medication(s) being used: none. Anxiety:  fatigue, shortness of breath                          Dyspnea:  acute dyspnea and chronic dyspnea                          Fatigue:  exercise intolerance    Other: Malnutrition- 4x daily supplements added to diet- focusing on comfort now. We feel the patient symptoms are being controlled. his current regimen is reviewed by myself and discussed with the staff. We recommend adjusting his medications as follows- adding comfort medications- Fentanyl 25mch Q2 hours PRN and Ativan 0.5mg Q6 hours PRN. 2- Goals of care evaluation   The patient goals of care are provide comfort care/support/palliation/relieve suffering and support for family/caregiver   Goals of care discussed with:    [] Patient independently    [x] Patient and Family    [] Family or Healthcare DPOA independently    [] Unable to discuss with patient, family/DPOA not present    3- Code Status  Full Code    4- Other recommendations   - We will continue to provide comfort and support to the patient and the family  Please call with any palliative questions or concerns. Palliative Care Team is available via perfect serve or via phone. Palliative Care will continue to follow Mr. Hawkins's care as needed. Thank you for allowing Palliative Care to participate in the care of Mr. Carli Whaley . This note has been dictated by dragon, typing errors may be a possibility.     The total time I spent in seeing the patient, discussing goals of care, advanced directives, code status, greater than 50% time in counseling and other major issues was more than 70 minutes      Electronically signed by   PEEWEE Herrera CNP  Palliative Care Team  on 5/26/2021 at 9:58 AM    Palliative care office: [phone]

## 2021-05-26 NOTE — PROGRESS NOTES
Noted patient to be tachycardic on monitor. Rounded on patient and noted that he was having a difficult time breathing. Oxygen 90 RA. Placed on [facility]. Vitals obtained. MEWS of 5. Pt resps are at 39. Pt still alert. Perfect served Margaret Moss. Awaiting response.

## 2021-05-26 NOTE — PROGRESS NOTES
Patient family Gatito Kenney, stated that patient does not know what he wants. She stated she was taking control of the POA at this time and patient is now going to be a full code. Had RN remove band from arm. Paged SON Robles for intermed. To come down to evaluate the situation.

## 2021-05-26 NOTE — PROGRESS NOTES
[facility]  Office: [address], DO, Justin Pichardo, DO, Bogdan Calvillo, DO, Jimmie Eric, DO, Brittani Doshi MD, Vance Arevalo MD, Lisa Patrick MD, Tosha Rhodes MD, Lo Chowdhury MD, Scooby Duncan MD, Martin Maza MD, Stewart Agrawal MD, Annamarie Issa, DO, Carol Hebert MD, Tae Little DO, Isrrael Modi MD,  Allan Velazquez DO, Tejal Maria MD, Neal Cervantes MD, Page David MD, Jacklyn Patel MD, Monserrat Willard [facility], [address], [facility], Aaron Ayala, CNP, Kay Paez, CNS, Coleman Fernandes, CNP, Thompson Lombard, CNP, Eleazar Amaya, CNP, Krzysztof Rai, CNP, Dayami Wright, CNP, Chepe Grimm PA-C, Christiano Macias [facility], Ehsan Nixon, CNP, Matt Funk, CNP, Miranda Bose, CNP, Tammie Mcnamara, CNP, Anselmo Mazariegos, CNP, Marbin Richardson, [facility]    Progress Note    5/26/2021    8:08 AM    Name:   Joselyn Pisano  MRN:     [id number]     Acct:      [de-identified]   Room:   [address] [place] [address] Day:  5  Admit Date:  5/21/2021 12:50 PM    PCP:   PEEWEE Donohue CNP  Code Status:  Full Code    Subjective:     C/C:   Chief Complaint   Patient presents with    Abdominal Pain    Other     jaundice     Interval History Status: not changed. RN reports patient accidentally pulled his valentine wile getting up to go to the bathroom around 6AM, he has not voided since. No signs of bleeding. Patient still having abdominal pain. Vitals stable. Database updates:  Bili 19.32, , , INR 5.2  Brief History:   57-year-old with history of alcoholic liver disease presented to ED on 5/21 with abdominal pain and worsening jaundice. Per EMR he was drinking alcohol until recent admission at [facility]. Jonh's from 5/16-5/20 with ascites secondary to liver failure, rectal bleeding.   He underwent EGD and colonoscopy which showed multiple duodenal versus moderate amount of portal hypertensive gastropathy, grade 2 internal hemorrhoids. Had paracentesis done on 5/19/2021 when 4250ml was removed. -GI was consulted. INR, bilirubin remain elevated. Review of Systems:     Constitutional:  negative for chills, fevers, sweats  Respiratory:  negative for cough, dyspnea on exertion, shortness of breath, wheezing  Cardiovascular:  negative for chest pain, chest pressure/discomfort, lower extremity edema, palpitations  Gastrointestinal:  positive for abdominal pain,nausea, negative for vomiting,constipation, diarrhea  Neurological:  negative for dizziness, headache    Medications: Allergies:     Allergies   Allergen Reactions    Pcn [Penicillins] Swelling    Vicodin [Hydrocodone-Acetaminophen] Swelling    Motrin [Ibuprofen] Swelling       Current Meds:   Scheduled Meds:    lactulose  20 g Oral 4x Daily    rifaximin  550 mg Oral BID    sodium bicarbonate  650 mg Oral 4x Daily    thiamine and folic acid IVPB   Intravenous Daily    sodium chloride flush  5-40 mL Intravenous 2 times per day    pentoxifylline  400 mg Oral TID WC    [Held by provider] folic acid  1 mg Oral Daily    furosemide  20 mg Oral Daily    budesonide-formoterol  2 puff Inhalation BID    multivitamin  1 tablet Oral Daily    nicotine  1 patch Transdermal Q24H    pantoprazole  40 mg Oral QAM AC    spironolactone  25 mg Oral Daily    [Held by provider] vitamin B-1  100 mg Oral Daily     Continuous Infusions:    sodium chloride 50 mL/hr at 05/25/21 1842    sodium chloride      sodium chloride      sodium chloride       PRN Meds: bisacodyl, [Held by provider] morphine, sodium chloride, sodium chloride flush, sodium chloride, [Held by provider] oxyCODONE, sodium chloride, potassium chloride, magnesium sulfate, acetaminophen, promethazine **OR** ondansetron, albuterol    Data:     Past Medical History:   has a past medical history of Anxiety, Asthma, Blind left eye, Cervical spine fracture ([facility] COPD (chronic obstructive pulmonary disease) ([facility] [facility] Depression, H/O: substance abuse ([facility] Hep C w/o coma, chronic ([facility] Hepatitis C, History of suicidal tendencies, Hx: UTI (urinary tract infection), and Schizophrenia, schizo-affective ([facility] Social History:   reports that he has been smoking cigarettes. He has a 150.00 pack-year smoking history. He has never used smokeless tobacco. He reports previous alcohol use of about 28.0 standard drinks of alcohol per week. He reports current drug use. Drug: Marijuana. Family History:   Family History   Problem Relation Age of Onset    Heart Disease Father        Vitals:  /75   Pulse 105   Temp 97.5 °F (36.4 °C) (Axillary)   Resp 19   Ht 6' 1\" (1.854 m)   Wt 180 lb (81.6 kg)   SpO2 94%   BMI 23.75 kg/m²   Temp (24hrs), Av.8 °F (36.6 °C), Min:97.3 °F (36.3 °C), Max:98.5 °F (36.9 °C)    No results for input(s): POCGLU in the last 72 hours. I/O (24Hr):     Intake/Output Summary (Last 24 hours) at 2021 0808  Last data filed at 2021 0418  Gross per 24 hour   Intake 2241.17 ml   Output 400 ml   Net 1841.17 ml       Labs:  Hematology:  Recent Labs     21  1352 21  0634 21  1915 21  0006 21  0604   WBC  --  9.3  --   --   --    RBC  --  4.00*  --   --   --    HGB  --  13.4  13.3 13.4 13.0 14.2   HCT  --  38.4*  37.9* 38.2* 39.5* 40.5*   MCV  --  96.0  --   --   --    MCH  --  33.5  --   --   --    MCHC  --  34.9*  --   --   --    RDW  --  16.2*  --   --   --    PLT  --  61*  --   --   --    MPV  --  11.7  --   --   --    INR 2.4 3.3  --   --  5.2*     Chemistry:  Recent Labs     21  0933 21  1915 21  0006 21  0604   * 133* 132* 138   K 4.4 4.0 4.0 4.2   CL 95* 96* 96* 97*   CO2 18* 21 17* 19*   CREATININE <0.40*  --   --   --    ANIONGAP 18* 16 19* 22*   LABGLOM CANNOT BE CALCULATED  --   --   --    GFRAA CANNOT BE CALCULATED  --   --   --      Recent Labs     21  0933 21  0634 21  1432 21  0604   PROT 6.3* 6.2*  --  6.2*   LABALBU 2.7* 2.9*  --  3.0*   * 317*  --  469*   * 188*  --  235*   ALKPHOS 194* 181*  --  211*   BILITOT 21.30* 19.20*  --  19.32*   BILIDIR 14.00* 13.71*  --  13.98*   AMMONIA  --   --  73*  --      ABG:No results found for: POCPH, PHART, PH, POCPCO2, NGY8XQQ, PCO2, POCPO2, PO2ART, PO2, POCHCO3, JFA9CZJ, HCO3, NBEA, PBEA, BEART, BE, THGBART, THB, SQM5XVO, KVXD7XSL, A8QWSYTV, O2SAT, FIO2  Lab Results   Component Value Date/Time    SPECIAL NOT REPORTED 05/16/2021 03:00 PM     Lab Results   Component Value Date/Time    CULTURE NO GROWTH 6 DAYS 05/16/2021 03:00 PM       Radiology:  XR ABDOMEN (KUB) (SINGLE AP VIEW)    Result Date: 5/25/2021  Nonspecific, nonobstructive bowel gas pattern. Moderate retained stool. Recently placed feeding tube no longer visualized. XR CHEST PORTABLE    Result Date: 5/21/2021  Low lung volumes with bibasilar atelectasis. The lungs are otherwise clear. IR US GUIDED PARACENTESIS    Result Date: 5/24/2021  Successful ultrasound guided paracentesis. US GUIDED PARACENTESIS    Result Date: 5/19/2021  Successful ultrasound guided paracentesis. IR PLACE NG TUBE BY DR Mundo Greer    Result Date: 5/24/2021  Fluoroscopic guided 8 Kazakh feeding tube placement with tip terminating in the proximal duodenum. Ready for use. Physical Examination:        General appearance:  alert, cooperative, appears uncomfortable due to continued abdominal pain   Mental Status:  oriented to person, place and time and flat affect  Lungs:  Bilateral bases slightly diminished, otherwise clear to auscultation bilaterally, normal effort  Heart:  regular rate and rhythm, no murmur  Abdomen:  Firm,distended, typanic bowel sounds,   Extremities: BLE, (R>L)edema.  No redness, tenderness in the calves  Skin:  no gross lesions, rashes, induration    Assessment:        Hospital Problems         Last Modified POA    * (Principal) Alcoholic liver failure ([address] 5/21/2021 Yes    COPD (chronic obstructive pulmonary disease) ([facility] 5/21/2021 Yes    Chronic hepatitis C without hepatic coma ([facility] 5/21/2021 Yes    Thrombocytopenia ([facility] 5/21/2021 Yes    Abnormal INR 5/21/2021 Yes    Hyponatremia 5/21/2021 Yes          Plan:      1. Acute alcoholic hepatitis with portal HTN presenting with ascites,hyperbilirubinemia, elevated LFTs, thrombocytopenia and coagulopathy:  No active bleeding noted at this point. INR and LFTs trending up this morning,Continue pentoxifylline 400 mg, increased to 4 times daily per GI yesterday. Continue to monitor  2. Abdominal pains with sluggish bowel sounds: KUB yesterday revealed moderate retained stool but no obstruction or ileus, patient is having bowel movements. Concern for SBP as well: Had 2.6 L of ascitic fluid drained by IR 05/24/21. fluid studies still pending, cipro 400mg IV x 1 given prophylactically yesterday. further dosing based on ascitic fluid cell count. 3.  Acute urinary retention: Tejada was placed yesterday and although output did not improve it was still low. Tejada catheter was accidentally removed this a.m. per patient, no output since, continue gentle IV fluids for retention. will monitor and reinsert Tejada if needed  4: Hyperammonemia: continue lactulose,  5. Hyponatremia secondary SIADH:improved. 6. Continue Lasix, spironolactone. 7. Continue oral bicarb. 8. Moderate malnutrition due poor oral intake: not a candidate for repeated attempts for NG/OG placement per IR, continue ensures as he is tolerating well   9. Continue GI prophylaxis. 10. Continue EPC cuffs for DVT prophylaxis secondary to thrombocytopenia & coagulopathy. 11. Poor prognosis: Palliative care on board. CODE STATUS discussion discussed with both patient and his daughter yesterday, at this time he remains a full code.   12. Plan discussed with patient and staff       PEEWEE Claros NP  5/26/2021  8:08 AM

## 2021-05-26 NOTE — PROGRESS NOTES
Silviano Mejia, daughter, updated on patient condition in person. Lc Espana, SON had conversation about patient condition and patient wanting to become a [facility]. Silviano Mejia verbalized understanding with Aracely Sheets and Fawad Foley. Silviano Mejia to contact family. Nadege to continue to stay at bedside.

## 2021-05-26 NOTE — ACP (ADVANCE CARE PLANNING)
Advance Care Planning     Advance Care Planning (ACP) Physician/NP/PA Conversation    Date of Conversation: 5/21/2021  Conducted with: Patient with Decision Making Capacity   Healthcare Decision Maker: Named in Advance Directive or Healthcare Power of  (name) rosario garcia is primary agent    Healthcare Decision Maker:      Primary Decision Maker: Lety Michael - [phone]    Secondary Decision Maker: Jocelynn Perera - Child - [phone]    Click here to complete Healthcare Decision Makers including selection of the Healthcare Decision Maker Relationship (ie \"Primary\")  Today we documented Decision Maker(s) consistent with ACP documents on file. Care Preferences:    Hospitalization: \"If your health worsens and it becomes clear that your chance of recovery is unlikely, what would be your preference regarding hospitalization? \"  Patient is currently hospitalized- daughter is electing for [facility] hospice meeting today    Ventilation: \"If you were unable to breath on your own and your chance of recovery was unlikely, what would be your preference about the use of a ventilator (breathing machine) if it was available to you? \"  The patient would NOT desire the use of a ventilator. Resuscitation: \"In the event your heart stopped as a result of an underlying serious health condition, would you want attempts made to restart your heart, or would you prefer a natural death? \"  No, do NOT attempt to resuscitate.     benefit/burden of treatment options, ventilation preferences, hospitalization preferences, resuscitation preferences and hospice care    Conversation Outcomes / Follow-Up Plan:  ACP complete - no further action today  Reviewed DNR/DNI and patient elects DNR order - completed portable DNR form & placed order    Length of Voluntary ACP Conversation in minutes:  16 minutes    PEEWEE Mason - CNP

## 2021-05-26 NOTE — PROGRESS NOTES
Physical Therapy  Facility/Department: [facility] PROGRESSIVE CARE  Daily Treatment Note  NAME: Alona Rivas  : 1955  MRN: [id number]    Date of Service: 2021    Discharge Recommendations:  Patient would benefit from continued therapy after discharge   PT Equipment Recommendations  Equipment Needed: No    Assessment   Body structures, Functions, Activity limitations: Decreased strength;Decreased posture  Assessment: Patient continues to demo poor tolerance of activity due to pain, overall decreased strength and endurance. Recommending continued skilled PT serivces. Prognosis: Good  PT Education: Transfer Training;Gait Training;PT Role  Patient Education: Pursed lip breathing, sit<>stand tech and safety. Patient Diagnosis(es): The encounter diagnosis was Acute liver failure without hepatic coma. has a past medical history of Anxiety, Asthma, Blind left eye, Cervical spine fracture ([address] COPD (chronic obstructive pulmonary disease) ([address] Depression, H/O: substance abuse ([facility] Hep C w/o coma, chronic ([address] Hepatitis C, History of suicidal tendencies, Hx: UTI (urinary tract infection), and Schizophrenia, schizo-affective ([address] has a past surgical history that includes Umbilical hernia repair; Cataract removal with implant; Vein Surgery; hernia repair; Eye surgery (Left, 13); vitrectomy (Left, 2013); eye surgery (Left, 13); Upper gastrointestinal endoscopy (N/A, 2021); and Colonoscopy (N/A, 2021). Restrictions  Restrictions/Precautions  Restrictions/Precautions: General Precautions, Fall Risk  Required Braces or Orthoses?: No  Position Activity Restriction  Other position/activity restrictions: Up w/assist, Lt UE IV  Subjective   General  Chart Reviewed: Yes  Response To Previous Treatment: Patient with no complaints from previous session. Family / Caregiver Present: Yes  Subjective  Subjective: Patient is agreeable for PT treatment.   General Comment  Comments: RN, report patient was medically stable for PT treatment. Patient recently given anti-nausea meds. Orientation  Orientation  Overall Orientation Status: Within Normal Limits  Cognition   Cognition  Overall Cognitive Status: Exceptions  Arousal/Alertness: Appropriate responses to stimuli;Delayed responses to stimuli  Following Commands: Follows one step commands with increased time; Follows one step commands with repetition  Attention Span: Attends with cues to redirect  Safety Judgement: Decreased awareness of need for safety;Decreased awareness of need for assistance  Problem Solving: Assistance required to generate solutions;Assistance required to identify errors made;Assistance required to implement solutions;Assistance required to correct errors made;Decreased awareness of errors  Insights: Decreased awareness of deficits  Initiation: Requires cues for all  Sequencing: Requires cues for all  Objective   Bed mobility  Rolling to Right: Contact guard assistance  Supine to Sit: Minimal assistance  Sit to Supine: Contact guard assistance;Minimal assistance  Scooting: Contact guard assistance  Comment: Patient required increased time and effort to complete task. Minimal VCs for sequencing and safety. Transfers  Sit to Stand: Contact guard assistance  Stand to sit: Contact guard assistance  Bed to Chair: Contact guard assistance  Stand Pivot Transfers: Contact guard assistance  Lateral Transfers: Contact guard assistance  Comment: w/ RW and VCs for increased safety  Ambulation  Ambulation?: Yes  More Ambulation?: No  Ambulation 1  Surface: level tile  Device: Rolling Walker  Assistance: Contact guard assistance;Minimal assistance  Quality of Gait: Significant flexed posture during gait, unable to stand upright  Gait Deviations: Slow Mayela;Decreased step length  Distance: 25' x 1  Comments: Patient report increased fatigue today and not agreeable for further ambulation.   Stairs/Curb  Stairs?: No  Neuromuscular Education  NDT Treatment: Gait ;Sitting;Standing  Neuromuscular Comments: .neuro  Balance  Sitting - Static: Fair  Sitting - Dynamic: Fair  Standing - Static: Fair;-  Standing - Dynamic: Fair;-  Comments: w/ RW for standing balance  Exercises  Comments: Patient issued seated and supine HEP with verbal review. Patient able to perfrom seated gaurav LE AROM well.      AM-PAC Score  AM-PAC Inpatient Mobility Raw Score : 18 (05/26/21 1644)  AM-PAC Inpatient T-Scale Score : 43.63 (05/26/21 1644)  Mobility Inpatient CMS 0-100% Score: 46.58 (05/26/21 1644)  Mobility Inpatient CMS G-Code Modifier : CK (05/26/21 1644)          Goals  Short term goals  Time Frame for Short term goals: 10 days  Short term goal 1: I bed mobility  Short term goal 2: I trasnfers  Short term goal 3: I gait with appropriate devices 150 feet  Short term goal 4: 17 steps using rail for support  Patient Goals   Patient goals : to feel better    Plan    Plan  Times per week: 1-2x/day, 5-6 days/week  Current Treatment Recommendations: Transfer Training, Strengthening  Safety Devices  Type of devices: Gait belt, Left in bed, All fall risk precautions in place  Restraints  Initially in place: No     Therapy Time   Individual Concurrent Group Co-treatment   Time In 1052         Time Out 1134         Minutes 831 [address]

## 2021-05-26 NOTE — PROGRESS NOTES
Per family, pt is to maintain DNR status. Katie José, and family all in agreement. Family talked with RN and Olena Martin, CNP. Patient to have wrist band placed back on and patient ok to have comfort medications.

## 2021-05-26 NOTE — PROGRESS NOTES
Eitan served Dr. Mays [facility] 300 for urine output via perfect serve and that valentine was placed with ua sent. No new orders at this time.

## 2021-05-26 NOTE — FLOWSHEET NOTE
[facility]  PROGRESS NOTE    Room # 1011/1011-01   Name: Dai Scherer            Christianity: [id number]     Reason for visit: Palliative Care    I visited the patient and daughter. Admit Date & Time: 5/21/2021 12:50 PM    Assessment:  Dai Scherer is a 77 y.o. male in the hospital because of \"abdominal pain. \" Pt's code status has been changed to [facility] and is transitioning to hospice care. Upon entering unit, writer met daughter, Ivory Garcia, in the hallway with palliative NP Ashu Lira. Ivory Garcia appeared tearful and experiencing anticipatory grief. Upon entering room, pt was curled up in bed. Pt states \"yes\" when asked if he is having pain by NP Ashu Lira and she will inquire about more pain medicine. Intervention:  I introduced myself and my title as  I offered space for the pt and daughter  to express feelings, needs, and concerns and provided a ministry presence. I provided a palliative blanket and prayer at bedside. Outcome: The pt and daughter were receptive. Cindy Sam for the visit. Plan:  Chaplains will remain available to offer spiritual and emotional support as needed. Please page the  on call for any urgent requests with \"Spiritual Care-Chaplains[facility]. \"    Electronically signed by Sonali Rose on 5/26/2021 at 12:57 PM.  Marina

## 2021-05-26 NOTE — PROGRESS NOTES
Updated Nadege on patient condition via phone- valentine cath placement as well as iv fluids for gentle hydration.

## 2021-05-27 LAB
C282Y HEMOCHROMATOSIS MUT: NEGATIVE
H63D HEMOCHROMATOSIS MUT: NEGATIVE
HEMOCHROMATOSIS MUTATION INT: NORMAL
HEMOCHROMATOSIS SPECIMEN: NORMAL
S65C HEMOCHROMATOSIS MUT: NEGATIVE
SURGICAL PATHOLOGY REPORT: NORMAL

## 2021-05-27 NOTE — DISCHARGE SUMMARY
[facility]  Office: [address], DO, Lorrie Cerda, DO, Lelajennifer Trevino, DO, [place] Eric, DO, Candace Barroso MD, Jose Reich MD, Bev Ferraro MD, Irma Barreto MD, Ev Mccloud MD, No Dueñas MD, Lopez Loving MD, Chao Ye MD, Joslyn Springer, DO, So Beltran MD, Meghann Mondragon, DO, Christopher Zaidi MD,  Trent Goodman, DO, Roxy Amin MD, Raffi Rodriguez MD, Morris Kline MD, Diamond Pratt MD, Josette Ng, [facility], [facility] Ricky, CNP, Jm Rivas, CNP, Bianka Moody, CNS, Master Hale, CNP, Fuentes Morrissey, CNP, Will Aguayo, CNP, Jarrod Urena, CNP, Maico Abraham, CNP, Song Ambriz PA-C, Vipul Machuca, [facility], Keith Crow, CNP, Sommer Perez, CNP, Ignacio Harley, CNP, Miya Hanson, CNP, Jacinda Carolina, CNP, Yunior Schmitz, [facility]    Discharge Summary     Patient ID: Connie Moreno  :  1955   MRN: [id number]     ACCOUNT:  [de-identified]   Patient's PCP: PEEWEE Hook CNP  Admit Date: 2021   Discharge Date: 2021     Length of Stay: 5  Code Status:  Prior  Admitting Physician: No admitting provider for patient encounter.   Discharge Physician: PEEWEE Schultz NP     Active Discharge Diagnoses:     Hospital Problem Lists:  Principal Problem:    Alcoholic liver failure ([facility] [address]  Active Problems:    COPD (chronic obstructive pulmonary disease) (HCC)    Chronic hepatitis C without hepatic coma (HCC)    Thrombocytopenia (HCC)    Abnormal INR    Hyponatremia    Palliative care encounter    Goals of care, counseling/discussion    DNR (do not resuscitate) discussion    Advanced directives, counseling/discussion  Resolved Problems:    Abdominal pain      Admission Condition:  serious     Discharged Condition: poor    Hospital Stay:     Hospital Course:  Connie Moreno is a 77 y.o. male who was admitted for the management of  Alcoholic liver failure ([facility] [address] , presented to ER with abdominal pain and worsening jaundice. He was drinking alcohol until recent admission at [facility] from 5/16-5/20 with ascites secondary to liver failure, rectal bleeding.  He underwent EGD and colonoscopy which showed multiple duodenal versus moderate amount of portal hypertensive gastropathy, grade 2 internal hemorrhoids. Had paracentesis done on 5/19/2021 when 4250ml was removed. During this admission, patient's INR and LFT's were elevated and GI was consulted for worsening liver failure and he was started on Lactulose,diuretics and pentoxifylline was continued. He was started in CIWA scale, thiamine and folate supplementation for alcohol abuse. He was found to be in metabolic acidosis and hyponatremic and was treated with sodium bicarb,gentle IV fluids and both later resolved. Urine studies were completed and serial electrolytes and CBC were monitored closely. No further sign or symptoms of active of bleeding were noted during admission. He received FFP for reversal of INR and IR was consulted for ascites and patient had an additional paracentesis done on 5/24/21 and 2.6L of fluid was removed and sent for culture. Chemical DVT prophylaxis was not given due to thrombocytopenia    Patient was had very poor appetite and was unable to maintain adequate calorie source. NG tube was placed per IR for tube feedings initially however patient  Later accidentally removed during frequent repositioning in bed. Nutritional supplements were added which he tolerated well. Patient had urinary retention and a valentine catheter was placed which was later removed acidentally by the patient while ambulating to the bathroom   Patients prognosis was poor from time of arrival, code discussed at length with patient and family, including POA.  Initially patient had opted to remain a full code, however due to continued decline the patient changed his code status to [facility] as he wanted to remain comfortable and stop aggressive measures. Hospice was consulted and patient was transferred there for continuation of end-of life care     Significant therapeutic interventions: see above     Significant Diagnostic Studies:   Labs / Micro:  CBC:   Lab Results   Component Value Date    WBC 9.3 05/25/2021    RBC 4.00 05/25/2021    RBC 5.27 04/19/2012    HGB 14.2 05/26/2021    HCT 41.7 05/26/2021    MCV 96.0 05/25/2021    MCH 33.5 05/25/2021    MCHC 34.9 05/25/2021    RDW 16.2 05/25/2021    PLT 61 05/25/2021     04/19/2012     HFP:    Lab Results   Component Value Date    PROT 6.2 05/26/2021     CMP:    Lab Results   Component Value Date    GLUCOSE 103 05/23/2021    GLUCOSE 76 04/19/2012     05/26/2021    K 4.0 05/26/2021    CL 96 05/26/2021    CO2 18 05/26/2021    BUN 35 05/23/2021    CREATININE <0.40 05/24/2021    ANIONGAP 23 05/26/2021    ALKPHOS 211 05/26/2021     05/26/2021     05/26/2021    BILITOT 19.32 05/26/2021    LABALBU 3.0 05/26/2021    ALBUMIN NOT REPORTED 05/26/2021    LABGLOM CANNOT BE CALCULATED 05/24/2021    GFRAA CANNOT BE CALCULATED 05/24/2021    GFR      05/24/2021    GFR NOT REPORTED 05/24/2021    PROT 6.2 05/26/2021    CALCIUM 8.8 05/23/2021     PT/INR:    Lab Results   Component Value Date    PROTIME 45.8 05/26/2021    INR 5.2 05/26/2021     U/A:    Lab Results   Component Value Date    COLORU BURAK 05/25/2021    TURBIDITY SLIGHTLY CLOUDY 05/25/2021    SPECGRAV 1.025 05/25/2021    HGBUR TRACE 05/25/2021    PHUR 5.5 05/25/2021    PROTEINU NEGATIVE 05/25/2021    GLUCOSEU TRACE 05/25/2021    GLUCOSEU NEGATIVE 11/28/2011    KETUA TRACE 05/25/2021    BILIRUBINUR  05/25/2021     Presumptive positive. Unable to confirm due to unavailability of reagent.     BILIRUBINUR NEGATIVE 11/28/2011    UROBILINOGEN ELEVATED 05/25/2021    NITRU NEGATIVE 05/25/2021    LEUKOCYTESUR NEGATIVE 05/25/2021     Radiology:  XR ABDOMEN (KUB) (SINGLE AP VIEW)    Result Date: 5/25/2021  Nonspecific, nonobstructive bowel gas pattern. Moderate retained stool. Recently placed feeding tube no longer visualized. XR CHEST PORTABLE    Result Date: 5/21/2021  Low lung volumes with bibasilar atelectasis. The lungs are otherwise clear. IR US GUIDED PARACENTESIS    Result Date: 5/24/2021  Successful ultrasound guided paracentesis. IR PLACE NG TUBE BY DR Uma Little    Result Date: 5/24/2021  Fluoroscopic guided 8 French feeding tube placement with tip terminating in the proximal duodenum. Ready for use. Consultations:    Consults:     Final Specialist Recommendations/Findings:   IP CONSULT TO GI  IP CONSULT TO HOSPITALIST  IP CONSULT TO SOCIAL WORK  IP CONSULT TO DIETITIAN  IP CONSULT TO PALLIATIVE CARE  IP CONSULT TO HOSPICE      The patient was seen and examined on day of discharge and this discharge summary is in conjunction with any daily progress note from day of discharge. Discharge plan:     Disposition: [facility]     Diet: regular diet    Activity: As tolerated    Discharge Medications:      Medication List      ASK your doctor about these medications    albuterol sulfate  (90 Base) MCG/ACT inhaler  Commonly known as: Proventil HFA  Inhale 2 puffs into the lungs every 6 hours as needed for Wheezing     folic acid 1 MG tablet  Commonly known as: FOLVITE  Take 1 tablet by mouth daily     furosemide 20 MG tablet  Commonly known as: LASIX  Take 1 tablet by mouth daily     lactulose 10 GM/15ML solution  Commonly known as: CHRONULAC  Take 30 mLs by mouth 3 times daily     mometasone-formoterol 100-5 MCG/ACT inhaler  Commonly known as: Dulera  Inhale 2 puffs into the lungs 2 times daily     multivitamin Tabs tablet  Take 1 tablet by mouth daily     nicotine 21 MG/24HR  Commonly known as: Nicoderm CQ  Place 1 patch onto the skin every 24 hours     oxyCODONE 5 MG immediate release tablet  Commonly known as: Roxicodone  Take 1 tablet by mouth every 8 hours as needed for Pain for up to 3 days.  Intended supply: 5 days. Take lowest dose possible to manage pain  Ask about: Should I take this medication? pantoprazole 40 MG tablet  Commonly known as: PROTONIX  Take 1 tablet by mouth every morning (before breakfast)     spironolactone 25 MG tablet  Commonly known as: ALDACTONE  Take 1 tablet by mouth daily     thiamine 100 MG tablet     vitamin B-1 100 MG tablet  Commonly known as: THIAMINE  Take 1 tablet by mouth daily            No discharge procedures on file. Time Spent on discharge is 41 mins in patient examination, evaluation, counseling as well as medication reconciliation, prescriptions for required medications, discharge plan and follow up. Electronically signed by   PEEWEE Chowdary NP  5/27/2021  7:39 AM      Thank you PEEWEE Jauregui - SON for the opportunity to be involved in this patient's care.

## 2021-05-28 LAB
APPEARANCE FLUID: NORMAL
BASO FLUID: NORMAL %
COLOR FLUID: NORMAL
EOSINOPHIL FLUID: NORMAL %
FLUID DIFF COMMENT: NORMAL
LYMPHOCYTES, BODY FLUID: 35 %
MONOCYTE, FLUID: NORMAL %
NEUTROPHIL, FLUID: 28 %
OTHER CELLS FLUID: NORMAL %
RBC FLUID: 9000 /MM3
SPECIMEN TYPE: NORMAL
WBC FLUID: 26 /MM3

## 2021-06-01 LAB
CULTURE: NORMAL
DIRECT EXAM: NORMAL
DIRECT EXAM: NORMAL
Lab: NORMAL
SPECIMEN DESCRIPTION: NORMAL

## 2021-06-17 ENCOUNTER — TELEPHONE (OUTPATIENT)
Dept: GASTROENTEROLOGY | Age: 66
End: 2021-06-17

## 2021-06-17 NOTE — TELEPHONE ENCOUNTER
for Dr Cao Quiet called to confirm appointment for 06/18/2021 and was told the patient has passed away on 05/27/2021

## (undated) DEVICE — SNARE ENDOSCP M L240CM LOOP W27MM SHTH DIA2.4MM OVL FLX

## (undated) DEVICE — TRAP SPEC RETRV CLR PLAS POLYP IN LN SUCT QUIK CTCH

## (undated) DEVICE — CLIP LIG L235CM RESOL 360 BX/20